# Patient Record
Sex: MALE | Race: WHITE | Employment: OTHER | URBAN - METROPOLITAN AREA
[De-identification: names, ages, dates, MRNs, and addresses within clinical notes are randomized per-mention and may not be internally consistent; named-entity substitution may affect disease eponyms.]

---

## 2020-11-03 ENCOUNTER — APPOINTMENT (OUTPATIENT)
Dept: INTERVENTIONAL RADIOLOGY/VASCULAR | Age: 60
End: 2020-11-03
Payer: MEDICARE

## 2020-11-03 ENCOUNTER — HOSPITAL ENCOUNTER (EMERGENCY)
Age: 60
Discharge: OTHER INSTITUTION | End: 2020-11-04
Attending: EMERGENCY MEDICINE
Payer: MEDICARE

## 2020-11-03 ENCOUNTER — APPOINTMENT (OUTPATIENT)
Dept: CT IMAGING | Age: 60
End: 2020-11-03
Payer: MEDICARE

## 2020-11-03 ENCOUNTER — APPOINTMENT (OUTPATIENT)
Dept: GENERAL RADIOLOGY | Age: 60
End: 2020-11-03
Payer: MEDICARE

## 2020-11-03 PROBLEM — I21.4 NSTEMI (NON-ST ELEVATED MYOCARDIAL INFARCTION) (HCC): Status: ACTIVE | Noted: 2020-11-03

## 2020-11-03 LAB
ABSOLUTE EOS #: 0.1 K/UL (ref 0–0.4)
ABSOLUTE IMMATURE GRANULOCYTE: ABNORMAL K/UL (ref 0–0.3)
ABSOLUTE LYMPH #: 2.2 K/UL (ref 1–4.8)
ABSOLUTE MONO #: 0.4 K/UL (ref 0.1–1.3)
ANION GAP SERPL CALCULATED.3IONS-SCNC: 9 MMOL/L (ref 9–17)
BASOPHILS # BLD: 1 % (ref 0–2)
BASOPHILS ABSOLUTE: 0.1 K/UL (ref 0–0.2)
BNP INTERPRETATION: NORMAL
BUN BLDV-MCNC: 15 MG/DL (ref 8–23)
BUN/CREAT BLD: ABNORMAL (ref 9–20)
CALCIUM SERPL-MCNC: 9.5 MG/DL (ref 8.6–10.4)
CHLORIDE BLD-SCNC: 102 MMOL/L (ref 98–107)
CO2: 24 MMOL/L (ref 20–31)
CREAT SERPL-MCNC: 0.64 MG/DL (ref 0.7–1.2)
D-DIMER QUANTITATIVE: 1.59 MG/L FEU (ref 0–0.59)
DIFFERENTIAL TYPE: ABNORMAL
EOSINOPHILS RELATIVE PERCENT: 1 % (ref 0–4)
GFR AFRICAN AMERICAN: >60 ML/MIN
GFR NON-AFRICAN AMERICAN: >60 ML/MIN
GFR SERPL CREATININE-BSD FRML MDRD: ABNORMAL ML/MIN/{1.73_M2}
GFR SERPL CREATININE-BSD FRML MDRD: ABNORMAL ML/MIN/{1.73_M2}
GLUCOSE BLD-MCNC: 131 MG/DL (ref 70–99)
HCT VFR BLD CALC: 44.9 % (ref 41–53)
HEMOGLOBIN: 15.3 G/DL (ref 13.5–17.5)
IMMATURE GRANULOCYTES: ABNORMAL %
INR BLD: 1
LYMPHOCYTES # BLD: 41 % (ref 24–44)
MCH RBC QN AUTO: 30.5 PG (ref 26–34)
MCHC RBC AUTO-ENTMCNC: 34.1 G/DL (ref 31–37)
MCV RBC AUTO: 89.4 FL (ref 80–100)
MONOCYTES # BLD: 8 % (ref 1–7)
MYOGLOBIN: 74 NG/ML (ref 28–72)
MYOGLOBIN: 94 NG/ML (ref 28–72)
NRBC AUTOMATED: ABNORMAL PER 100 WBC
PARTIAL THROMBOPLASTIN TIME: 26.9 SEC (ref 24–36)
PDW BLD-RTO: 12.8 % (ref 11.5–14.9)
PLATELET # BLD: 195 K/UL (ref 150–450)
PLATELET ESTIMATE: ABNORMAL
PMV BLD AUTO: 7.6 FL (ref 6–12)
POTASSIUM SERPL-SCNC: 4 MMOL/L (ref 3.7–5.3)
PRO-BNP: 70 PG/ML
PROTHROMBIN TIME: 12.6 SEC (ref 11.8–14.6)
RBC # BLD: 5.03 M/UL (ref 4.5–5.9)
RBC # BLD: ABNORMAL 10*6/UL
SEG NEUTROPHILS: 49 % (ref 36–66)
SEGMENTED NEUTROPHILS ABSOLUTE COUNT: 2.6 K/UL (ref 1.3–9.1)
SODIUM BLD-SCNC: 135 MMOL/L (ref 135–144)
THYROXINE, FREE: 1.36 NG/DL (ref 0.93–1.7)
TROPONIN INTERP: ABNORMAL
TROPONIN INTERP: ABNORMAL
TROPONIN T: ABNORMAL NG/ML
TROPONIN T: ABNORMAL NG/ML
TROPONIN, HIGH SENSITIVITY: 27 NG/L (ref 0–22)
TROPONIN, HIGH SENSITIVITY: 70 NG/L (ref 0–22)
TSH SERPL DL<=0.05 MIU/L-ACNC: 1.28 MIU/L (ref 0.3–5)
WBC # BLD: 5.4 K/UL (ref 3.5–11)
WBC # BLD: ABNORMAL 10*3/UL

## 2020-11-03 PROCEDURE — 85730 THROMBOPLASTIN TIME PARTIAL: CPT

## 2020-11-03 PROCEDURE — 99285 EMERGENCY DEPT VISIT HI MDM: CPT

## 2020-11-03 PROCEDURE — 85379 FIBRIN DEGRADATION QUANT: CPT

## 2020-11-03 PROCEDURE — 96376 TX/PRO/DX INJ SAME DRUG ADON: CPT

## 2020-11-03 PROCEDURE — 2709999900 IR FLUORO GUIDED CVA DEVICE PLMT/REPLACE/REMOVAL

## 2020-11-03 PROCEDURE — 6370000000 HC RX 637 (ALT 250 FOR IP): Performed by: EMERGENCY MEDICINE

## 2020-11-03 PROCEDURE — 83880 ASSAY OF NATRIURETIC PEPTIDE: CPT

## 2020-11-03 PROCEDURE — 6360000004 HC RX CONTRAST MEDICATION: Performed by: EMERGENCY MEDICINE

## 2020-11-03 PROCEDURE — 36415 COLL VENOUS BLD VENIPUNCTURE: CPT

## 2020-11-03 PROCEDURE — 84439 ASSAY OF FREE THYROXINE: CPT

## 2020-11-03 PROCEDURE — 85025 COMPLETE CBC W/AUTO DIFF WBC: CPT

## 2020-11-03 PROCEDURE — 71045 X-RAY EXAM CHEST 1 VIEW: CPT

## 2020-11-03 PROCEDURE — 83874 ASSAY OF MYOGLOBIN: CPT

## 2020-11-03 PROCEDURE — 96374 THER/PROPH/DIAG INJ IV PUSH: CPT

## 2020-11-03 PROCEDURE — 76000 FLUOROSCOPY <1 HR PHYS/QHP: CPT | Performed by: RADIOLOGY

## 2020-11-03 PROCEDURE — 76937 US GUIDE VASCULAR ACCESS: CPT | Performed by: RADIOLOGY

## 2020-11-03 PROCEDURE — 84484 ASSAY OF TROPONIN QUANT: CPT

## 2020-11-03 PROCEDURE — 71260 CT THORAX DX C+: CPT

## 2020-11-03 PROCEDURE — 36410 VNPNXR 3YR/> PHY/QHP DX/THER: CPT | Performed by: RADIOLOGY

## 2020-11-03 PROCEDURE — 85610 PROTHROMBIN TIME: CPT

## 2020-11-03 PROCEDURE — 2500000003 HC RX 250 WO HCPCS: Performed by: EMERGENCY MEDICINE

## 2020-11-03 PROCEDURE — 80048 BASIC METABOLIC PNL TOTAL CA: CPT

## 2020-11-03 PROCEDURE — 2580000003 HC RX 258: Performed by: EMERGENCY MEDICINE

## 2020-11-03 PROCEDURE — 99291 CRITICAL CARE FIRST HOUR: CPT

## 2020-11-03 PROCEDURE — 6360000002 HC RX W HCPCS: Performed by: EMERGENCY MEDICINE

## 2020-11-03 PROCEDURE — 96372 THER/PROPH/DIAG INJ SC/IM: CPT

## 2020-11-03 PROCEDURE — 93005 ELECTROCARDIOGRAM TRACING: CPT | Performed by: EMERGENCY MEDICINE

## 2020-11-03 PROCEDURE — 84443 ASSAY THYROID STIM HORMONE: CPT

## 2020-11-03 RX ORDER — NICOTINE 21 MG/24HR
1 PATCH, TRANSDERMAL 24 HOURS TRANSDERMAL DAILY
Status: DISCONTINUED | OUTPATIENT
Start: 2020-11-03 | End: 2020-11-05 | Stop reason: HOSPADM

## 2020-11-03 RX ORDER — MORPHINE SULFATE 4 MG/ML
4 INJECTION, SOLUTION INTRAMUSCULAR; INTRAVENOUS ONCE
Status: COMPLETED | OUTPATIENT
Start: 2020-11-03 | End: 2020-11-03

## 2020-11-03 RX ORDER — NITROGLYCERIN 20 MG/100ML
5 INJECTION INTRAVENOUS CONTINUOUS
Status: DISCONTINUED | OUTPATIENT
Start: 2020-11-03 | End: 2020-11-05 | Stop reason: HOSPADM

## 2020-11-03 RX ORDER — ASPIRIN 81 MG/1
324 TABLET, CHEWABLE ORAL ONCE
Status: COMPLETED | OUTPATIENT
Start: 2020-11-03 | End: 2020-11-03

## 2020-11-03 RX ORDER — OXYCODONE HYDROCHLORIDE AND ACETAMINOPHEN 5; 325 MG/1; MG/1
1 TABLET ORAL ONCE
Status: COMPLETED | OUTPATIENT
Start: 2020-11-03 | End: 2020-11-03

## 2020-11-03 RX ORDER — SODIUM CHLORIDE 0.9 % (FLUSH) 0.9 %
10 SYRINGE (ML) INJECTION PRN
Status: DISCONTINUED | OUTPATIENT
Start: 2020-11-03 | End: 2020-11-05 | Stop reason: HOSPADM

## 2020-11-03 RX ORDER — NITROGLYCERIN 0.4 MG/1
0.4 TABLET SUBLINGUAL EVERY 5 MIN PRN
Status: DISCONTINUED | OUTPATIENT
Start: 2020-11-03 | End: 2020-11-05 | Stop reason: HOSPADM

## 2020-11-03 RX ORDER — 0.9 % SODIUM CHLORIDE 0.9 %
80 INTRAVENOUS SOLUTION INTRAVENOUS ONCE
Status: COMPLETED | OUTPATIENT
Start: 2020-11-03 | End: 2020-11-03

## 2020-11-03 RX ORDER — M-VIT,TX,IRON,MINS/CALC/FOLIC 27MG-0.4MG
1 TABLET ORAL DAILY
Status: ON HOLD | COMMUNITY
End: 2020-11-06 | Stop reason: HOSPADM

## 2020-11-03 RX ADMIN — NITROGLYCERIN 5 MCG/MIN: 20 INJECTION INTRAVENOUS at 22:27

## 2020-11-03 RX ADMIN — NITROGLYCERIN 0.4 MG: 0.4 TABLET SUBLINGUAL at 13:45

## 2020-11-03 RX ADMIN — MORPHINE SULFATE 4 MG: 4 INJECTION, SOLUTION INTRAMUSCULAR; INTRAVENOUS at 14:59

## 2020-11-03 RX ADMIN — SODIUM CHLORIDE 80 ML: 9 INJECTION, SOLUTION INTRAVENOUS at 17:00

## 2020-11-03 RX ADMIN — OXYCODONE HYDROCHLORIDE AND ACETAMINOPHEN 1 TABLET: 5; 325 TABLET ORAL at 22:49

## 2020-11-03 RX ADMIN — ENOXAPARIN SODIUM 70 MG: 80 INJECTION SUBCUTANEOUS at 18:55

## 2020-11-03 RX ADMIN — ASPIRIN 324 MG: 81 TABLET, CHEWABLE ORAL at 18:09

## 2020-11-03 RX ADMIN — MORPHINE SULFATE 4 MG: 4 INJECTION, SOLUTION INTRAMUSCULAR; INTRAVENOUS at 18:09

## 2020-11-03 RX ADMIN — IOPAMIDOL 75 ML: 755 INJECTION, SOLUTION INTRAVENOUS at 17:00

## 2020-11-03 RX ADMIN — Medication 10 ML: at 17:00

## 2020-11-03 ASSESSMENT — PAIN SCALES - GENERAL
PAINLEVEL_OUTOF10: 4
PAINLEVEL_OUTOF10: 6
PAINLEVEL_OUTOF10: 8

## 2020-11-03 ASSESSMENT — ENCOUNTER SYMPTOMS
SORE THROAT: 0
COUGH: 0
EYE DISCHARGE: 0
EYE REDNESS: 0
FACIAL SWELLING: 0
BLOOD IN STOOL: 0
DIARRHEA: 0
VOMITING: 0
WHEEZING: 0
SHORTNESS OF BREATH: 0
EYE PAIN: 0
BACK PAIN: 0
TROUBLE SWALLOWING: 0
COLOR CHANGE: 0
CONSTIPATION: 0
ABDOMINAL PAIN: 0
SINUS PRESSURE: 0
NAUSEA: 0
CHEST TIGHTNESS: 0
RHINORRHEA: 0

## 2020-11-03 NOTE — ED NOTES
Pt resting in bed. Reports pain is now a 2/10. Denies needs at this time.      Julio Garcia RN  11/03/20 8005

## 2020-11-03 NOTE — ED PROVIDER NOTES
[address] ED  eMERGENCY dEPARTMENT eNCOUnter      Pt Name: Elva Modi  MRN: [id number]  [place] 1960  Date of evaluation: 11/3/20      CHIEF COMPLAINT       Chief Complaint   Patient presents with    Chest Pain         HISTORY OF PRESENT ILLNESS    Elva Modi is a 63 y.o. male who presents complaining of chest pain. Patient states that about an hour ago he started having what felt like an electrical sensation going across from the center of his chest and down to the upper portions of his bilateral arms. Patient states the pain is still there he did take an aspirin and the pain is starting to resolve but it still there. Patient states he is not had any recent illnesses did not feel short of breath or have palpitations. Patient has had no fevers. Patient has had history of blood clots in the past and also strokes. REVIEW OF SYSTEMS       Review of Systems   Constitutional: Negative for activity change, appetite change, chills, diaphoresis and fever. HENT: Negative for congestion, ear pain, facial swelling, nosebleeds, rhinorrhea, sinus pressure, sore throat and trouble swallowing. Eyes: Negative for pain, discharge and redness. Respiratory: Negative for cough, chest tightness, shortness of breath and wheezing. Cardiovascular: Positive for chest pain. Negative for palpitations and leg swelling. Gastrointestinal: Negative for abdominal pain, blood in stool, constipation, diarrhea, nausea and vomiting. Genitourinary: Negative for difficulty urinating, dysuria, flank pain, frequency, genital sores and hematuria. Musculoskeletal: Negative for arthralgias, back pain, gait problem, joint swelling, myalgias and neck pain. Skin: Negative for color change, pallor, rash and wound. Neurological: Negative for dizziness, tremors, seizures, syncope, speech difficulty, weakness, numbness and headaches.    Psychiatric/Behavioral: Negative for confusion, decreased concentration, hallucinations, self-injury, sleep disturbance and suicidal ideas. PAST MEDICAL HISTORY     Past Medical History:   Diagnosis Date    Hepatitis C     Hyperlipidemia     Hypertension     L5 spinal cord injury ([address]     problem with right leg nerve    Pancreatitis     Pulmonary embolism (HCC)     Stroke (HCC)     TIA (transient ischemic attack)        SURGICAL HISTORY       Past Surgical History:   Procedure Laterality Date    BACK SURGERY      LIVER BIOPSY         CURRENT MEDICATIONS       Previous Medications    ASPIRIN 81 MG EC TABLET    Take 1 tablet by mouth daily    GABAPENTIN (NEURONTIN) 100 MG CAPSULE    Take 100 mg by mouth nightly     SIMVASTATIN (ZOCOR) 40 MG TABLET    Take 1 tablet by mouth nightly    TRAZODONE (DESYREL) 100 MG TABLET    Take 100 mg by mouth nightly. WARFARIN (COUMADIN) 5 MG TABLET    Review INR prior to administration. ALLERGIES     is allergic to ativan [lorazepam]. FAMILY HISTORY     [unfilled]     SOCIAL HISTORY      reports that he has been smoking. He has been smoking about 0.50 packs per day. He has never used smokeless tobacco. He reports that he does not drink alcohol or use drugs. PHYSICAL EXAM     INITIAL VITALS: /65   Pulse 61   Temp 97.6 °F (36.4 °C)   Resp 13   Ht 6' (1.829 m)   Wt 154 lb (69.9 kg)   SpO2 95%   BMI 20.89 kg/m²      Physical Exam  Vitals signs and nursing note reviewed. Constitutional:       General: He is not in acute distress. Appearance: He is well-developed. He is not diaphoretic. HENT:      Head: Normocephalic and atraumatic. Eyes:      General: No scleral icterus. Right eye: No discharge. Left eye: No discharge. Conjunctiva/sclera: Conjunctivae normal.      Pupils: Pupils are equal, round, and reactive to light. Cardiovascular:      Rate and Rhythm: Normal rate and regular rhythm. Heart sounds: Normal heart sounds. No murmur. No friction rub. No gallop.     Pulmonary: Effort: Pulmonary effort is normal. No respiratory distress. Breath sounds: Normal breath sounds. No wheezing or rales. Chest:      Chest wall: No tenderness. Abdominal:      General: Bowel sounds are normal. There is no distension. Palpations: Abdomen is soft. There is no mass. Tenderness: There is no abdominal tenderness. There is no guarding or rebound. Musculoskeletal: Normal range of motion. General: No tenderness. Skin:     General: Skin is warm and dry. Coloration: Skin is not pale. Findings: No erythema or rash. Neurological:      Mental Status: He is alert and oriented to person, place, and time. Cranial Nerves: No cranial nerve deficit. Sensory: No sensory deficit. Motor: No abnormal muscle tone. Coordination: Coordination normal.      Deep Tendon Reflexes: Reflexes normal.   Psychiatric:         Behavior: Behavior normal.         Thought Content: Thought content normal.         Judgment: Judgment normal.         MEDICAL DECISION MAKING:     Patient symptoms are not typical for cardiac complaint but patient has significant peripheral vascular disease and is also had a prior PE in the past therefore he does deserve a full work-up. DIAGNOSTIC RESULTS     EKG: All EKG's are interpreted by the Emergency Department Physician who either signs or Co-signs this chart in the absence of a cardiologist.    EKG Interpretation    Interpreted by emergency department physician    Rhythm: normal sinus   Rate: normal  Axis: right  Ectopy: none  Conduction: normal  ST Segments: normal  T Waves: normal  Q Waves: none    Clinical Impression: EKG: normal sinus rhythm, right axis deviation otherwise no acute abnormalities. Almon Gearing        RADIOLOGY:All plain film, CT, MRI, and formal ultrasound images (except ED bedside ultrasound)are read by the radiologist and interpretations are directly viewed by the emergency physician.   Ir Fluoro Guided Marcelo Mahajan Unknown Type of Exam: Unknown FINDINGS: Single portable frontal view of the chest is submitted for review. The cardiac silhouette is normal in size. Lung parenchyma is clear without focal airspace consolidation, sizeable pleural effusion, or pneumothorax. Trachea is midline. Visualized osseous structures and soft tissues are grossly intact. No acute cardiopulmonary pathology. Ct Chest Pulmonary Embolism W Contrast    Result Date: 11/3/2020  EXAMINATION: CTA OF THE CHEST 11/3/2020 3:50 pm TECHNIQUE: CTA of the chest was performed after the administration of intravenous contrast.  Multiplanar reformatted images are provided for review. MIP images are provided for review. Dose modulation, iterative reconstruction, and/or weight based adjustment of the mA/kV was utilized to reduce the radiation dose to as low as reasonably achievable. COMPARISON: CT chest dated October 9, 2012 HISTORY: ORDERING SYSTEM PROVIDED HISTORY: Chest Pain r/o PE TECHNOLOGIST PROVIDED HISTORY: Chest Pain r/o PE Reason for Exam: chest pain since 8am Acuity: Acute Type of Exam: Initial Relevant Medical/Surgical History: hx: PE and stroke FINDINGS: Pulmonary Arteries: Pulmonary arteries are adequately opacified for evaluation. No evidence of intraluminal filling defect to suggest pulmonary embolism. Main pulmonary artery is normal in caliber. Mediastinum: No evidence of mediastinal lymphadenopathy. The heart and pericardium demonstrate no acute abnormality. There is no acute abnormality of the thoracic aorta. Lungs/pleura: Emphysematous changes are again seen throughout the lungs, with an upper lobe predominance. Dependent changes are noted within the lung bases. Apical fibrotic changes are again visualized bilaterally. 3 mm nodule is again seen within the left upper lobe, image number 52, unchanged. 5 mm nodule is again seen within the left lower lobe, image number 93, and previously measured approximately 4 mm.   No new nodules are present. Upper Abdomen: Images through the upper abdomen demonstrate parapelvic cyst formation, within the left kidney, unchanged. Soft Tissues/Bones: No acute osseous abnormality is present. A prominent pectus deformity is again visualized. Epidural stimulator is noted within the lower thoracic region. 1. No evidence of pulmonary embolism 2. 5 and 3 mm left lung nodule. 3. Diffuse emphysematous changes again seen throughout the lungs, with an upper lobe predominance 4. No evidence of pneumonia RECOMMENDATIONS: Fleischner Society guidelines for follow-up and management of incidentally detected pulmonary nodules: Multiple Solid Nodules: Nodule size less than 6 mm: In a low-risk patient, no routine follow-up. - Low risk patients include individuals with minimal or absent history of smoking and other known risk factors. - High risk patients include individuals with a history or smoking or known risk factors. Reference: Radiology 2017 http://pubs. rsna.org/doi/full/10.1148/radiol. 7371038078           LABS: All lab results were reviewed byparesh, and all abnormals are listed below.   Labs Reviewed   BASIC METABOLIC PANEL - Abnormal; Notable for the following components:       Result Value    Glucose 131 (*)     CREATININE 0.64 (*)     All other components within normal limits   CBC WITH AUTO DIFFERENTIAL - Abnormal; Notable for the following components:    Monocytes 8 (*)     All other components within normal limits   D-DIMER, QUANTITATIVE - Abnormal; Notable for the following components:    D-Dimer, Quant 1.59 (*)     All other components within normal limits   TROP/MYOGLOBIN - Abnormal; Notable for the following components:    Troponin, High Sensitivity 27 (*)     Myoglobin 74 (*)     All other components within normal limits   TROP/MYOGLOBIN - Abnormal; Notable for the following components:    Troponin, High Sensitivity 70 (*)     Myoglobin 94 (*)     All other components within normal limits   BRAIN NATRIURETIC PEPTIDE   TSH WITHOUT REFLEX   T4, FREE   APTT   PROTIME-INR         EMERGENCY DEPARTMENT COURSE:   Vitals:    Vitals:    11/03/20 1530 11/03/20 1545 11/03/20 1600 11/03/20 1615   BP: 102/71 110/67 104/68 103/65   Pulse: 65 62 60 61   Resp: 13 9 11 13   Temp:       SpO2: 96% 96% 96% 95%   Weight:       Height:           The patient was given the following medications while in the emergency department:     Orders Placed This Encounter   Medications    nitroGLYCERIN (NITROSTAT) SL tablet 0.4 mg    morphine sulfate (PF) injection 4 mg    nicotine (NICODERM CQ) 21 MG/24HR 1 patch    0.9 % sodium chloride bolus    sodium chloride flush 0.9 % injection 10 mL    iopamidol (ISOVUE-370) 76 % injection 75 mL    aspirin chewable tablet 324 mg    enoxaparin (LOVENOX) injection 70 mg    morphine sulfate (PF) injection 4 mg       -------------------------  5:49 PM EST  Patient was reevaluated and updated on results. Patient is still having pain mostly in his back at this point in time but he has an elevating troponin. I spoke with Dr. Madina Almonte for cardiology and he would prefer the patient be transferred to [facility] where there is a Cath Lab but will see him here if there is no bed available at [address] [facility] PM EST  Discussed the case with nurse practitioner Miley saez at [facility] who accepts the transfer and Dr. Bea Garay name. CRITICAL CARE:     CRITICAL CARE: There was a high probability of clinically significant/life threatening deterioration in this patient's condition which required my urgent intervention. Total critical care time was 30 minutes. This excludes any time for separately reportable procedures.          CONSULTS:  IP CONSULT TO CARDIOLOGY  IP CONSULT TO PRIMARY CARE PROVIDER    PROCEDURES:  None    FINAL IMPRESSION      1. NSTEMI (non-ST elevated myocardial infarction) [facility])          DISPOSITION/PLAN   DISPOSITION Decision To Transfer 11/03/2020 05:49:15 PM      PATIENT

## 2020-11-03 NOTE — ED NOTES
Mode of arrival (squad #, walk in, police, etc) : Walk-in        Chief complaint(s): Chest pain, Bilateral Arm pain. Arrival Note (brief scenario, treatment PTA, etc). : Pt presents to the Ed with chief complaint of mid sternal chest pain and bilateral arm pain. Pt repotrs arm pain start in his \"armpits and goes down to my elbow\" Pt states he took an 81mg aspirin at home PTA and states the pain in now going away. C= \"Have you ever felt that you should Cut down on your drinking? \"  Refused  A= \"Have people Annoyed you by criticizing your drinking? \"  No  G= \"Have you ever felt bad or Guilty about your drinking? \"  No  E= \"Have you ever had a drink as an Eye-opener first thing in the morning to steady your nerves or to help a hangover? \"  No      Deferred []      Reason for deferring: N/A    *If yes to two or more: probable alcohol abuse. Yarely Reyna RN  11/03/20 1548

## 2020-11-03 NOTE — ED NOTES
Pt returned to ED from IR. This RN to bedside to draw repeat troponin.       Trisha Singh, RN  11/03/20 6111

## 2020-11-03 NOTE — ED NOTES
Prior to administration of first nitro pt rated chest pain a 9/10. 5 minutes after first nitro patient reports pain is now a 4/10. 2nd dose of nitro not administered due to drop in patients blood pressure.       Anna David RN  11/03/20 3304

## 2020-11-03 NOTE — ED NOTES
Access center was called at 1806 due to not hearing from the Hospitalist from [facility] yet; they repaged DENIZ Boland, at 1806. [facility] [place] actually called back with DENIZ Boland at 3072, and consult was completed at this time.  is accepting the patient, and as of now we are just waiting on a bed assignment.      Sunny Uribe  11/03/20 7998

## 2020-11-04 VITALS
DIASTOLIC BLOOD PRESSURE: 83 MMHG | HEART RATE: 64 BPM | OXYGEN SATURATION: 94 % | SYSTOLIC BLOOD PRESSURE: 112 MMHG | BODY MASS INDEX: 20.86 KG/M2 | RESPIRATION RATE: 17 BRPM | HEIGHT: 72 IN | TEMPERATURE: 97.8 F | WEIGHT: 154 LBS

## 2020-11-04 LAB
TROPONIN INTERP: ABNORMAL
TROPONIN INTERP: ABNORMAL
TROPONIN T: ABNORMAL NG/ML
TROPONIN T: ABNORMAL NG/ML
TROPONIN, HIGH SENSITIVITY: 58 NG/L (ref 0–22)
TROPONIN, HIGH SENSITIVITY: 88 NG/L (ref 0–22)

## 2020-11-04 PROCEDURE — 36415 COLL VENOUS BLD VENIPUNCTURE: CPT

## 2020-11-04 PROCEDURE — 6360000002 HC RX W HCPCS: Performed by: EMERGENCY MEDICINE

## 2020-11-04 PROCEDURE — 6370000000 HC RX 637 (ALT 250 FOR IP): Performed by: EMERGENCY MEDICINE

## 2020-11-04 PROCEDURE — 6360000002 HC RX W HCPCS: Performed by: INTERNAL MEDICINE

## 2020-11-04 PROCEDURE — 84484 ASSAY OF TROPONIN QUANT: CPT

## 2020-11-04 RX ORDER — MORPHINE SULFATE 4 MG/ML
4 INJECTION, SOLUTION INTRAMUSCULAR; INTRAVENOUS ONCE
Status: COMPLETED | OUTPATIENT
Start: 2020-11-04 | End: 2020-11-04

## 2020-11-04 RX ORDER — OXYCODONE HYDROCHLORIDE AND ACETAMINOPHEN 5; 325 MG/1; MG/1
1 TABLET ORAL ONCE
Status: COMPLETED | OUTPATIENT
Start: 2020-11-04 | End: 2020-11-04

## 2020-11-04 RX ORDER — ASPIRIN 81 MG/1
81 TABLET ORAL DAILY
Status: DISCONTINUED | OUTPATIENT
Start: 2020-11-05 | End: 2020-11-05 | Stop reason: HOSPADM

## 2020-11-04 RX ORDER — ACETAMINOPHEN 500 MG
1000 TABLET ORAL ONCE
Status: COMPLETED | OUTPATIENT
Start: 2020-11-04 | End: 2020-11-04

## 2020-11-04 RX ADMIN — ACETAMINOPHEN 1000 MG: 500 TABLET, FILM COATED ORAL at 09:30

## 2020-11-04 RX ADMIN — ENOXAPARIN SODIUM 70 MG: 80 INJECTION SUBCUTANEOUS at 19:22

## 2020-11-04 RX ADMIN — MORPHINE SULFATE 4 MG: 4 INJECTION, SOLUTION INTRAMUSCULAR; INTRAVENOUS at 12:39

## 2020-11-04 RX ADMIN — OXYCODONE HYDROCHLORIDE AND ACETAMINOPHEN 1 TABLET: 5; 325 TABLET ORAL at 22:24

## 2020-11-04 RX ADMIN — MORPHINE SULFATE 4 MG: 4 INJECTION, SOLUTION INTRAMUSCULAR; INTRAVENOUS at 03:03

## 2020-11-04 RX ADMIN — MORPHINE SULFATE 4 MG: 4 INJECTION, SOLUTION INTRAMUSCULAR; INTRAVENOUS at 17:22

## 2020-11-04 RX ADMIN — ENOXAPARIN SODIUM 70 MG: 80 INJECTION SUBCUTANEOUS at 06:38

## 2020-11-04 ASSESSMENT — PAIN SCALES - GENERAL
PAINLEVEL_OUTOF10: 7
PAINLEVEL_OUTOF10: 8
PAINLEVEL_OUTOF10: 7
PAINLEVEL_OUTOF10: 7
PAINLEVEL_OUTOF10: 6
PAINLEVEL_OUTOF10: 7
PAINLEVEL_OUTOF10: 8

## 2020-11-04 ASSESSMENT — PAIN DESCRIPTION - PAIN TYPE: TYPE: ACUTE PAIN

## 2020-11-04 ASSESSMENT — PAIN DESCRIPTION - ORIENTATION: ORIENTATION: LEFT;MID

## 2020-11-04 ASSESSMENT — PAIN DESCRIPTION - FREQUENCY: FREQUENCY: INTERMITTENT

## 2020-11-04 ASSESSMENT — PAIN DESCRIPTION - DESCRIPTORS
DESCRIPTORS: CONSTANT
DESCRIPTORS: ACHING

## 2020-11-04 ASSESSMENT — PAIN DESCRIPTION - LOCATION
LOCATION: BACK
LOCATION: CHEST

## 2020-11-04 NOTE — ED NOTES
Waiting on a bed at [facility] for patient as Mindi Letters is discharge dependent.       Caden Murphy  11/03/20 3046

## 2020-11-04 NOTE — ED NOTES
Spoke with patient's daughter [facility] and updated on plan of care per pt request.      Aparna Carver RN  11/04/20 3882

## 2020-11-04 NOTE — ED NOTES
Report given to Manas Morrison RN from ED. Report method in person   The following was reviewed with receiving RN:   Current vital signs:  /82   Pulse 69   Temp 97.6 °F (36.4 °C)   Resp 9   Ht 6' (1.829 m)   Wt 154 lb (69.9 kg)   SpO2 97%   BMI 20.89 kg/m²                      Any medication or safety alerts were reviewed. Any pending diagnostics and notifications were also reviewed, as well as any safety concerns or issues, abnormal labs, abnormal imaging, and abnormal assessment findings. Questions were answered.             Merline Myers RN  11/03/20 7890

## 2020-11-04 NOTE — ED NOTES
Verbal order per Dr. Iona Mendoza to give 4mg morphine IM due to Pt. Not having an IV that will flush. Injection given in right deltoid.      Erickson Cormier RN  11/04/20 9105

## 2020-11-04 NOTE — ED NOTES
Pt provided w kristi ginger ale and apple juice. Pt resting comfortably in bed, call light within reach.        Serafin Lee  11/04/20 8634

## 2020-11-04 NOTE — PROGRESS NOTES
Medication History completed:    New medications: Morphine pain pump, multivitamin    Medications discontinued: Aspirin, Warfarin, Gabapentin, Trazodone, Simvastatin    Changes to dosing: None    Stated allergies: As listed    Other pertinent information: Morphine pain pump. Follows with [facility]. OARRS reviewed. Patient states they use Marijuana.     Thank you,  Thaddeus Lesch, PharmD Candidate [id number]

## 2020-11-04 NOTE — ED NOTES
Spoke with [facility] will not have any bed available tonight.       Eusebio Faust  11/03/20 2049

## 2020-11-04 NOTE — ED NOTES
Spoke with [facility] regarding transfer to [facility] [address] and was told they are no beds available tonight. Access states there will be beds at [facility] at shift change. Updated pt on information. Pt agreeable to transfer to [facility]. Cardiologist paged.      Jad Vicente RN  11/04/20 5029

## 2020-11-05 ENCOUNTER — HOSPITAL ENCOUNTER (INPATIENT)
Age: 60
LOS: 1 days | Discharge: HOME | DRG: 247 | End: 2020-11-06
Attending: INTERNAL MEDICINE | Admitting: INTERNAL MEDICINE
Payer: MEDICARE

## 2020-11-05 ENCOUNTER — APPOINTMENT (OUTPATIENT)
Dept: CARDIAC CATH/INVASIVE PROCEDURES | Age: 60
DRG: 247 | End: 2020-11-05
Attending: INTERNAL MEDICINE
Payer: MEDICARE

## 2020-11-05 LAB
ACTIVATED CLOTTING TIME: 238 SEC (ref 79–149)
HCT VFR BLD CALC: 47.3 % (ref 40.7–50.3)
HEMOGLOBIN: 16 G/DL (ref 13–17)
MCH RBC QN AUTO: 29.6 PG (ref 25.2–33.5)
MCHC RBC AUTO-ENTMCNC: 33.8 G/DL (ref 28.4–34.8)
MCV RBC AUTO: 87.6 FL (ref 82.6–102.9)
NRBC AUTOMATED: 0 PER 100 WBC
PARTIAL THROMBOPLASTIN TIME: 62.8 SEC (ref 20.5–30.5)
PDW BLD-RTO: 12 % (ref 11.8–14.4)
PLATELET # BLD: 187 K/UL (ref 138–453)
PMV BLD AUTO: 9.4 FL (ref 8.1–13.5)
RBC # BLD: 5.4 M/UL (ref 4.21–5.77)
TROPONIN INTERP: ABNORMAL
TROPONIN INTERP: ABNORMAL
TROPONIN T: ABNORMAL NG/ML
TROPONIN T: ABNORMAL NG/ML
TROPONIN, HIGH SENSITIVITY: 67 NG/L (ref 0–22)
TROPONIN, HIGH SENSITIVITY: 74 NG/L (ref 0–22)
WBC # BLD: 10.6 K/UL (ref 3.5–11.3)

## 2020-11-05 PROCEDURE — 85347 COAGULATION TIME ACTIVATED: CPT

## 2020-11-05 PROCEDURE — 36415 COLL VENOUS BLD VENIPUNCTURE: CPT

## 2020-11-05 PROCEDURE — C1769 GUIDE WIRE: HCPCS

## 2020-11-05 PROCEDURE — 99222 1ST HOSP IP/OBS MODERATE 55: CPT | Performed by: INTERNAL MEDICINE

## 2020-11-05 PROCEDURE — 6360000004 HC RX CONTRAST MEDICATION

## 2020-11-05 PROCEDURE — 6360000002 HC RX W HCPCS: Performed by: STUDENT IN AN ORGANIZED HEALTH CARE EDUCATION/TRAINING PROGRAM

## 2020-11-05 PROCEDURE — 027034Z DILATION OF CORONARY ARTERY, ONE ARTERY WITH DRUG-ELUTING INTRALUMINAL DEVICE, PERCUTANEOUS APPROACH: ICD-10-PCS | Performed by: INTERNAL MEDICINE

## 2020-11-05 PROCEDURE — 6370000000 HC RX 637 (ALT 250 FOR IP): Performed by: STUDENT IN AN ORGANIZED HEALTH CARE EDUCATION/TRAINING PROGRAM

## 2020-11-05 PROCEDURE — C1725 CATH, TRANSLUMIN NON-LASER: HCPCS

## 2020-11-05 PROCEDURE — C9600 PERC DRUG-EL COR STENT SING: HCPCS | Performed by: INTERNAL MEDICINE

## 2020-11-05 PROCEDURE — 93458 L HRT ARTERY/VENTRICLE ANGIO: CPT | Performed by: INTERNAL MEDICINE

## 2020-11-05 PROCEDURE — 85027 COMPLETE CBC AUTOMATED: CPT

## 2020-11-05 PROCEDURE — 2580000003 HC RX 258: Performed by: NURSE PRACTITIONER

## 2020-11-05 PROCEDURE — C1874 STENT, COATED/COV W/DEL SYS: HCPCS

## 2020-11-05 PROCEDURE — 2500000003 HC RX 250 WO HCPCS

## 2020-11-05 PROCEDURE — 6360000002 HC RX W HCPCS: Performed by: NURSE PRACTITIONER

## 2020-11-05 PROCEDURE — 2060000000 HC ICU INTERMEDIATE R&B

## 2020-11-05 PROCEDURE — 85730 THROMBOPLASTIN TIME PARTIAL: CPT

## 2020-11-05 PROCEDURE — 4A023N7 MEASUREMENT OF CARDIAC SAMPLING AND PRESSURE, LEFT HEART, PERCUTANEOUS APPROACH: ICD-10-PCS | Performed by: INTERNAL MEDICINE

## 2020-11-05 PROCEDURE — 2709999900 HC NON-CHARGEABLE SUPPLY

## 2020-11-05 PROCEDURE — 6360000002 HC RX W HCPCS

## 2020-11-05 PROCEDURE — 6370000000 HC RX 637 (ALT 250 FOR IP)

## 2020-11-05 PROCEDURE — C1887 CATHETER, GUIDING: HCPCS

## 2020-11-05 PROCEDURE — 6370000000 HC RX 637 (ALT 250 FOR IP): Performed by: NURSE PRACTITIONER

## 2020-11-05 PROCEDURE — B2111ZZ FLUOROSCOPY OF MULTIPLE CORONARY ARTERIES USING LOW OSMOLAR CONTRAST: ICD-10-PCS | Performed by: INTERNAL MEDICINE

## 2020-11-05 PROCEDURE — 2580000003 HC RX 258: Performed by: STUDENT IN AN ORGANIZED HEALTH CARE EDUCATION/TRAINING PROGRAM

## 2020-11-05 PROCEDURE — 84484 ASSAY OF TROPONIN QUANT: CPT

## 2020-11-05 PROCEDURE — B2151ZZ FLUOROSCOPY OF LEFT HEART USING LOW OSMOLAR CONTRAST: ICD-10-PCS | Performed by: INTERNAL MEDICINE

## 2020-11-05 RX ORDER — HYDRALAZINE HYDROCHLORIDE 20 MG/ML
10 INJECTION INTRAMUSCULAR; INTRAVENOUS EVERY 10 MIN PRN
Status: DISCONTINUED | OUTPATIENT
Start: 2020-11-05 | End: 2020-11-06 | Stop reason: HOSPADM

## 2020-11-05 RX ORDER — ASPIRIN 81 MG/1
81 TABLET, CHEWABLE ORAL DAILY
Status: DISCONTINUED | OUTPATIENT
Start: 2020-11-06 | End: 2020-11-06 | Stop reason: HOSPADM

## 2020-11-05 RX ORDER — SODIUM CHLORIDE 0.9 % (FLUSH) 0.9 %
10 SYRINGE (ML) INJECTION PRN
Status: DISCONTINUED | OUTPATIENT
Start: 2020-11-05 | End: 2020-11-06 | Stop reason: HOSPADM

## 2020-11-05 RX ORDER — HEPARIN SODIUM 1000 [USP'U]/ML
4000 INJECTION, SOLUTION INTRAVENOUS; SUBCUTANEOUS ONCE
Status: DISCONTINUED | OUTPATIENT
Start: 2020-11-05 | End: 2020-11-05

## 2020-11-05 RX ORDER — ATORVASTATIN CALCIUM 80 MG/1
80 TABLET, FILM COATED ORAL NIGHTLY
Status: DISCONTINUED | OUTPATIENT
Start: 2020-11-05 | End: 2020-11-06 | Stop reason: HOSPADM

## 2020-11-05 RX ORDER — LABETALOL HYDROCHLORIDE 5 MG/ML
10 INJECTION, SOLUTION INTRAVENOUS EVERY 30 MIN PRN
Status: DISCONTINUED | OUTPATIENT
Start: 2020-11-05 | End: 2020-11-06 | Stop reason: HOSPADM

## 2020-11-05 RX ORDER — HEPARIN SODIUM 1000 [USP'U]/ML
4000 INJECTION, SOLUTION INTRAVENOUS; SUBCUTANEOUS PRN
Status: DISCONTINUED | OUTPATIENT
Start: 2020-11-05 | End: 2020-11-05

## 2020-11-05 RX ORDER — ACETAMINOPHEN 325 MG/1
650 TABLET ORAL EVERY 6 HOURS PRN
Status: DISCONTINUED | OUTPATIENT
Start: 2020-11-05 | End: 2020-11-06 | Stop reason: HOSPADM

## 2020-11-05 RX ORDER — HEPARIN SODIUM 10000 [USP'U]/100ML
12 INJECTION, SOLUTION INTRAVENOUS CONTINUOUS
Status: DISCONTINUED | OUTPATIENT
Start: 2020-11-05 | End: 2020-11-05

## 2020-11-05 RX ORDER — ACETAMINOPHEN 650 MG/1
650 SUPPOSITORY RECTAL EVERY 6 HOURS PRN
Status: DISCONTINUED | OUTPATIENT
Start: 2020-11-05 | End: 2020-11-06 | Stop reason: HOSPADM

## 2020-11-05 RX ORDER — SODIUM CHLORIDE 0.9 % (FLUSH) 0.9 %
10 SYRINGE (ML) INJECTION PRN
Status: DISCONTINUED | OUTPATIENT
Start: 2020-11-05 | End: 2020-11-05

## 2020-11-05 RX ORDER — ONDANSETRON 2 MG/ML
4 INJECTION INTRAMUSCULAR; INTRAVENOUS EVERY 6 HOURS PRN
Status: DISCONTINUED | OUTPATIENT
Start: 2020-11-05 | End: 2020-11-06 | Stop reason: HOSPADM

## 2020-11-05 RX ORDER — HEPARIN SODIUM 5000 [USP'U]/ML
5000 INJECTION, SOLUTION INTRAVENOUS; SUBCUTANEOUS EVERY 8 HOURS SCHEDULED
Status: DISCONTINUED | OUTPATIENT
Start: 2020-11-05 | End: 2020-11-06 | Stop reason: HOSPADM

## 2020-11-05 RX ORDER — SODIUM CHLORIDE 0.9 % (FLUSH) 0.9 %
10 SYRINGE (ML) INJECTION EVERY 12 HOURS SCHEDULED
Status: DISCONTINUED | OUTPATIENT
Start: 2020-11-05 | End: 2020-11-06 | Stop reason: HOSPADM

## 2020-11-05 RX ORDER — MORPHINE SULFATE 4 MG/ML
4 INJECTION, SOLUTION INTRAMUSCULAR; INTRAVENOUS
Status: DISCONTINUED | OUTPATIENT
Start: 2020-11-05 | End: 2020-11-06 | Stop reason: HOSPADM

## 2020-11-05 RX ORDER — ASPIRIN 81 MG/1
81 TABLET, CHEWABLE ORAL DAILY
Status: DISCONTINUED | OUTPATIENT
Start: 2020-11-06 | End: 2020-11-05

## 2020-11-05 RX ORDER — POTASSIUM CHLORIDE 7.45 MG/ML
10 INJECTION INTRAVENOUS PRN
Status: DISCONTINUED | OUTPATIENT
Start: 2020-11-05 | End: 2020-11-06 | Stop reason: HOSPADM

## 2020-11-05 RX ORDER — HEPARIN SODIUM 1000 [USP'U]/ML
2000 INJECTION, SOLUTION INTRAVENOUS; SUBCUTANEOUS PRN
Status: DISCONTINUED | OUTPATIENT
Start: 2020-11-05 | End: 2020-11-05

## 2020-11-05 RX ORDER — POTASSIUM CHLORIDE 20 MEQ/1
40 TABLET, EXTENDED RELEASE ORAL PRN
Status: DISCONTINUED | OUTPATIENT
Start: 2020-11-05 | End: 2020-11-06 | Stop reason: HOSPADM

## 2020-11-05 RX ORDER — MORPHINE SULFATE 2 MG/ML
2 INJECTION, SOLUTION INTRAMUSCULAR; INTRAVENOUS
Status: DISCONTINUED | OUTPATIENT
Start: 2020-11-05 | End: 2020-11-06 | Stop reason: HOSPADM

## 2020-11-05 RX ORDER — ACETAMINOPHEN 325 MG/1
650 TABLET ORAL EVERY 4 HOURS PRN
Status: DISCONTINUED | OUTPATIENT
Start: 2020-11-05 | End: 2020-11-06 | Stop reason: HOSPADM

## 2020-11-05 RX ORDER — MAGNESIUM SULFATE 1 G/100ML
1 INJECTION INTRAVENOUS PRN
Status: DISCONTINUED | OUTPATIENT
Start: 2020-11-05 | End: 2020-11-06 | Stop reason: HOSPADM

## 2020-11-05 RX ORDER — SODIUM CHLORIDE 0.9 % (FLUSH) 0.9 %
10 SYRINGE (ML) INJECTION EVERY 12 HOURS SCHEDULED
Status: DISCONTINUED | OUTPATIENT
Start: 2020-11-05 | End: 2020-11-05

## 2020-11-05 RX ORDER — ATORVASTATIN CALCIUM 80 MG/1
80 TABLET, FILM COATED ORAL NIGHTLY
Status: DISCONTINUED | OUTPATIENT
Start: 2020-11-05 | End: 2020-11-05

## 2020-11-05 RX ORDER — FAMOTIDINE 20 MG/1
20 TABLET, FILM COATED ORAL 2 TIMES DAILY
Status: DISCONTINUED | OUTPATIENT
Start: 2020-11-05 | End: 2020-11-05

## 2020-11-05 RX ORDER — NITROGLYCERIN 0.4 MG/1
0.4 TABLET SUBLINGUAL EVERY 5 MIN PRN
Status: DISCONTINUED | OUTPATIENT
Start: 2020-11-05 | End: 2020-11-06 | Stop reason: HOSPADM

## 2020-11-05 RX ORDER — PROMETHAZINE HYDROCHLORIDE 12.5 MG/1
12.5 TABLET ORAL EVERY 6 HOURS PRN
Status: DISCONTINUED | OUTPATIENT
Start: 2020-11-05 | End: 2020-11-06 | Stop reason: HOSPADM

## 2020-11-05 RX ADMIN — MORPHINE SULFATE 2 MG: 2 INJECTION, SOLUTION INTRAMUSCULAR; INTRAVENOUS at 08:40

## 2020-11-05 RX ADMIN — MORPHINE SULFATE 2 MG: 2 INJECTION, SOLUTION INTRAMUSCULAR; INTRAVENOUS at 11:31

## 2020-11-05 RX ADMIN — METOPROLOL TARTRATE 12.5 MG: 25 TABLET ORAL at 23:08

## 2020-11-05 RX ADMIN — FAMOTIDINE 20 MG: 20 TABLET, FILM COATED ORAL at 02:52

## 2020-11-05 RX ADMIN — Medication 10 ML: at 23:09

## 2020-11-05 RX ADMIN — Medication 10 ML: at 08:44

## 2020-11-05 RX ADMIN — MORPHINE SULFATE 2 MG: 2 INJECTION, SOLUTION INTRAMUSCULAR; INTRAVENOUS at 23:10

## 2020-11-05 RX ADMIN — MORPHINE SULFATE 2 MG: 2 INJECTION, SOLUTION INTRAMUSCULAR; INTRAVENOUS at 18:44

## 2020-11-05 RX ADMIN — METOPROLOL TARTRATE 12.5 MG: 25 TABLET ORAL at 08:30

## 2020-11-05 RX ADMIN — ATORVASTATIN CALCIUM 80 MG: 80 TABLET, FILM COATED ORAL at 23:09

## 2020-11-05 RX ADMIN — METOPROLOL TARTRATE 12.5 MG: 25 TABLET ORAL at 02:52

## 2020-11-05 RX ADMIN — SODIUM CHLORIDE, PRESERVATIVE FREE 10 ML: 5 INJECTION INTRAVENOUS at 02:52

## 2020-11-05 RX ADMIN — HEPARIN SODIUM 5000 UNITS: 5000 INJECTION INTRAVENOUS; SUBCUTANEOUS at 23:11

## 2020-11-05 RX ADMIN — NITROGLYCERIN 0.4 MG: 0.4 TABLET SUBLINGUAL at 18:10

## 2020-11-05 RX ADMIN — FAMOTIDINE 20 MG: 20 TABLET, FILM COATED ORAL at 08:30

## 2020-11-05 RX ADMIN — MORPHINE SULFATE 2 MG: 2 INJECTION, SOLUTION INTRAMUSCULAR; INTRAVENOUS at 02:52

## 2020-11-05 ASSESSMENT — ENCOUNTER SYMPTOMS
COUGH: 0
ABDOMINAL DISTENTION: 0
NAUSEA: 0
ABDOMINAL PAIN: 1
SHORTNESS OF BREATH: 0
VOMITING: 0
BLOOD IN STOOL: 0
BACK PAIN: 1
WHEEZING: 0
PHOTOPHOBIA: 0

## 2020-11-05 ASSESSMENT — PAIN DESCRIPTION - ORIENTATION: ORIENTATION: LEFT;MID

## 2020-11-05 ASSESSMENT — PAIN DESCRIPTION - DESCRIPTORS: DESCRIPTORS: PRESSURE

## 2020-11-05 ASSESSMENT — PAIN DESCRIPTION - FREQUENCY: FREQUENCY: INTERMITTENT

## 2020-11-05 ASSESSMENT — PAIN SCALES - GENERAL
PAINLEVEL_OUTOF10: 10
PAINLEVEL_OUTOF10: 6
PAINLEVEL_OUTOF10: 4
PAINLEVEL_OUTOF10: 6
PAINLEVEL_OUTOF10: 3
PAINLEVEL_OUTOF10: 8

## 2020-11-05 ASSESSMENT — PAIN DESCRIPTION - PAIN TYPE
TYPE: ACUTE PAIN
TYPE: ACUTE PAIN

## 2020-11-05 ASSESSMENT — PAIN DESCRIPTION - LOCATION: LOCATION: BACK;CHEST

## 2020-11-05 ASSESSMENT — PAIN DESCRIPTION - ONSET: ONSET: ON-GOING

## 2020-11-05 NOTE — ED NOTES
Spoke with Shira Guevara at [facility]. ETA of 0200. Will call around for sooner transportation.       [facility]  11/04/20 2041

## 2020-11-05 NOTE — ED NOTES
RN attempted to call Neal Loya RN at [facility] [phone] for an update. RASHAD Ling unavailable at this time and to call this RN back.       Julien Huntley RN  11/04/20 6872

## 2020-11-05 NOTE — CONSULTS
[facility] Cardiology    Inpatient Consultation Note               Today's Date: 11/5/2020  Patient Name: Kostas Kee  Date of admission: 11/5/2020 12:40 AM  Patient's age: 63 y.o., 1960  Admission Dx: NSTEMI (non-ST elevated myocardial infarction) [facility]) [I21.4]    Reason for  Consult:  Chest pain along with troponin elevation    Requesting Physician: Cely Ortiz DO    CHIEF COMPLAINT:     No chief complaint on file. History Obtained From:  patient, electronic medical record    HISTORY OF PRESENT ILLNESS:      The patient is a 63 y.o. male who is admitted to the hospital for chest pain. PMH significant for HTN, HLD, Pulmonary embolism and CVA. Patient presented to the ED due to acute onset chest pain which he describes as sharp and radiating from his chest to back and both arms. He reports no other complaints. Reports pain improved after SL nitroglycerin. EKG showed no acute changes. Troponin noted to be up trending. Cardiology consulted for Chest pain in the setting of elevated troponin. Past Medical History:   has a past medical history of Hepatitis C, Hyperlipidemia, Hypertension, L5 spinal cord injury ([address] Pancreatitis, Pulmonary embolism ([facility] [address] [facility] Stroke ([address] and TIA (transient ischemic attack). Past Surgical History:   has a past surgical history that includes liver biopsy and back surgery. Home Medications:    Prior to Admission medications    Medication Sig Start Date End Date Taking? Authorizing Provider   Multiple Vitamins-Minerals (THERAPEUTIC MULTIVITAMIN-MINERALS) tablet Take 1 tablet by mouth daily    Historical Provider, MD   NONFORMULARY Morphine pain pump.  Follows with [facility]    Historical Provider, MD        Current Facility-Administered Medications: sodium chloride flush 0.9 % injection 10 mL, 10 mL, Intravenous, 2 times per day  sodium chloride flush 0.9 % injection 10 mL, 10 mL, Intravenous, PRN  potassium chloride (KLOR-CON M) voiding, or hematuria. · Musculoskeletal: No joint complaints. · Neurological: No headache  · Hematologic/Lymphatic: No abnormal bruising or bleeding      PHYSICAL EXAM:      /85   Pulse 71   Temp 98.1 °F (36.7 °C) (Oral)   Resp 12   Ht 6' (1.829 m)   Wt 153 lb (69.4 kg)   SpO2 95%   BMI 20.75 kg/m²      Intake/Output Summary (Last 24 hours) at 11/5/2020 0911  Last data filed at 11/5/2020 0827  Gross per 24 hour   Intake 100 ml   Output 625 ml   Net -525 ml         Constitutional and General Appearance:    Alert, cooperative, no distress and appears stated age  Respiratory:  · No for increased work of breathing. · On auscultation: clear to auscultation bilaterally  Cardiovascular:  · Regular S1 and S2.   · Systolic murmur appreciated at LLSB  Abdomen:   · No masses or tenderness  · Bowel sounds present  Extremities:  ·  No Cyanosis or Clubbing  ·  Lower extremity edema: No  Neurological:  · Alert and oriented. · Moves all extremities well    DATA:    Diagnostics:    ECHO 8/28/15: EF 55%, AV sclerosis without stenosis, mildly thickened MV leaflets. Labs:     CBC:   Recent Labs     11/03/20  1350   WBC 5.4   HGB 15.3   HCT 44.9        BMP:   Recent Labs     11/03/20  1350      K 4.0   CO2 24   BUN 15   CREATININE 0.64*   LABGLOM >60   GLUCOSE 131*     Pro-BNP:    Recent Labs     11/03/20  1350   PROBNP 70     BNP: No results for input(s): BNP in the last 72 hours. PT/INR:   Recent Labs     11/03/20  1350   PROTIME 12.6   INR 1.0     APTT:  Recent Labs     11/03/20  1350   APTT 26.9     CARDIAC ENZYMES:No results for input(s): CKTOTAL, CKMB, CKMBINDEX, TROPONINI in the last 72 hours.     Invalid input(s):  [address]  Recent Labs     11/04/20  1925 11/05/20  0316 11/05/20  0523   TROPONINT NOT REPORTED NOT REPORTED NOT REPORTED       FASTING LIPID PANEL:  Lab Results   Component Value Date    HDL 30 08/28/2015    TRIG 127 08/28/2015     LIVER PROFILE:No results for input(s): AST, ALT, Additional Comments:   NSTEMI type 1  CP concern for UA  HTN  DL  CVA  - start heparin drip  - on ASA, statin, BB  - I have recommend left heart catheterization with possible PCI. I have discussed risks (including but not limited to vascular injury, infection, hematoma, contrast induced kidney dysfunction, CVA and MI), benefits, alternatives in detail. All questions answered. Patient agrees to proceed. - check 2d Echo    Discussed with patient and nursing. Thank you for allowing me to participate in the care of this patient, please do not hesitate to call if you have any questions. Landry Kauffman DO, [facility], [address], [address] [facility]  [facility] [place]  [phone]

## 2020-11-05 NOTE — PROGRESS NOTES
[place] [facility]  Documentation Note                Admission Dx: NSTEMI (non-ST elevated myocardial infarction) ([facility] [address] [I21.4]    Past Medical History:   has a past medical history of Hepatitis C, Hyperlipidemia, Hypertension, L5 spinal cord injury ([facility] [address] Pancreatitis, Pulmonary embolism ([facility] [address] Stroke ([facility] [address] and TIA (transient ischemic attack). Previous Testing:     ECHO 8/28/15: EF 55%, AV sclerosis without stenosis, mildly thickened MV leaflets. Previous office/hospital visit:   Dr. Rahel Arreola 11/4/2020 ([facility])  1. Atypical chest pain  2. NSTEMI  3. Essential HTN  4. Hyperlipidemia  5. H/o CVA  6. H/o PE; negative hypercoagulable w/u in 2015  7. COPD    8. Tobacco abuse    Plan --  1. Continue IV NTG and follow troponins  2. Will give 2nd dose of Lovenox tonight, then hold for cardiac catheterization tomorrow at [address]; warfarin on hold with INR 1.0 yesterday   3. Will continue ASA at 81 mg daily   4. Will start metoprolol 12.5 mg po bid   5. Pt will likely be admitted to [address] when bed is available this evening.     Lv Ballesteros [facility]

## 2020-11-05 NOTE — ED NOTES
Report given to Cem Cisse RN for an update from Cristobal Garcia, [facility]. Report method by phone   The following was reviewed with receiving RN:   Current vital signs:  /83   Pulse 64   Temp 97.8 °F (36.6 °C) (Oral)   Resp 17   Ht 6' (1.829 m)   Wt 154 lb (69.9 kg)   SpO2 94%   BMI 20.89 kg/m²                MEWS Score: 1     Any medication or safety alerts were reviewed. Any pending diagnostics and notifications were also reviewed, as well as any safety concerns or issues, abnormal labs, abnormal imaging, and abnormal assessment findings. Questions were answered.             Gamal Josue RN  11/05/20 0005

## 2020-11-05 NOTE — PROGRESS NOTES
Patient admitted, consent signed and questions answered. Patient ready for procedure. Call light to reach with side rails up 2 of 2. Right wrist and bilateral groin clipped. History and physical completed.

## 2020-11-05 NOTE — CARE COORDINATION
Case Management Initial Discharge Plan  Favian ,             Met with:patient to discuss discharge plans. Information verified: address, contacts, phone number, , insurance Yes    Emergency Contact/Next of Kin name & number:   Nolan Cushing, daughter, [phone] and daughter Paula Perdomo [phone]    PCP: No primary care provider on file. Date of last visit: He will get one after D/C, declined my offer to do    Insurance Provider: Medicare/[employer]    Discharge Planning    Living Arrangements:  [address] [place] [address] [place]:  Children, Family Members, Tigre Valdivia has 1 story  3 stairs to climb to get into front door    Patient able to perform ADL's:Independent    Current Services (outpatient & in home) none  DME equipment: cane  DME provider:     Receiving oral anticoagulation therapy? No    If indicated:   Physician managing anticoagulation treatment: n/a  Where does patient obtain lab work for ATC treatment? n/a      Potential Assistance Needed:       Patient agreeable to home care: No  [place] of choice provided:  n/a    Prior SNF/Rehab Placement and Facility: none  Agreeable to SNF/Rehab: No  [place] of choice provided: n/a     Evaluation: no    Expected Discharge date:       Patient expects to be discharged to: Follow Up Appointment: Best Day/ Time:      Transportation provider: kids  Transportation arrangements needed for discharge: No    Readmission Risk              Risk of Unplanned Readmission:        9             Does patient have a readmission risk score greater than 14?: No  If yes, follow-up appointment must be made within 7 days of discharge.      Goals of Care: Improved circulation to the heart      Discharge Plan: Home with son          Electronically signed by Cristy Zapata RN on 20 at 12:50 PM EST

## 2020-11-05 NOTE — CONSULTS
[facility]  In PatientCardiology Consult             Date:   11/4/2020  Patient name: Gaurav Sanchez  Date of admission:  11/3/2020  1:10 PM  MRN:   [id number]  YOB: 1960      Reason for Admission:  Chest pain    CHIEF COMPLAINT:  Chest pain    History Obtained From:  Patient and medical record    HISTORY OF PRESENT ILLNESS:      The patient is a 63 y.o gentleman with h/o HTN, HLP, PE and CVA, admitted for chest pain and elevated troponin. He had been remaining active with no chest pain or SOB until last evening, when he developed sharp pain shooting from the center of his chest, radiating to his back and to both arms. He has had no recent cough or fever. His pain improved from 10/10 intensity to 4/10 after one SL NTG, and he has had no further CP on IV NTG. EKG showed no acute changes, with troponin increasing from 27 ng/L last evening, to 70 ng/L this AM.  Chest CTA yesterday showed no infiltrates, edema or PE. Past Medical History:   has a past medical history of Hepatitis C, Hyperlipidemia, Hypertension, L5 spinal cord injury ([facility] [address] Pancreatitis, Pulmonary embolism ([facility] [address] Stroke ([facility] [address] and TIA (transient ischemic attack). Past Surgical History:   has a past surgical history that includes liver biopsy and back surgery. Home Medications:    Prior to Admission medications    Medication Sig Start Date End Date Taking? Authorizing Provider   Multiple Vitamins-Minerals (THERAPEUTIC MULTIVITAMIN-MINERALS) tablet Take 1 tablet by mouth daily   Yes Historical Provider, MD   NONFORMULARY Morphine pain pump. Follows with [facility]   Yes Historical Provider, MD       Allergies:  Ativan [lorazepam]    Social History:   reports that he has been smoking. He has been smoking about 0.50 packs per day. He has never used smokeless tobacco. He reports that he does not drink alcohol or use drugs.      Family History:   Positive for early CAD    REVIEW OF SYSTEMS: bruit  · Peripheral pulses are symmetrical and full   Abdomen:  · No masses or tenderness  · Bowel sounds present  Extremities:  ·  No Cyanosis or Clubbing  ·  Lower extremity edema: None; Caitlin's sign negative  ·  Skin: Warm and dry  Neurological:  · Alert and oriented. · Moves all extremities well  · No abnormalities of mood, affect, memory, mentation, or behavior are noted    DATA:    Diagnostics:      EKG:  Sinus rhythm, 69 bpm; incomplete RBBB and right superior axis deviation with no  changes from 2015    2D ECHO ( 8/29/15)  Technically difficult study due to patients body habitus. Left ventricle is normal in size. Global left ventricular systolic function  is normal.  Estimated ejection fraction is 55 % . Normal right ventricular size and function. Aortic valve sclerosis without stenosis. Mildly thickened mitral valve leaflets. No significant pericardial effusion is seen.         Labs:     CBC:   Recent Labs     11/03/20  1350   WBC 5.4   HGB 15.3   HCT 44.9        BMP:   Recent Labs     11/03/20  1350      K 4.0   CO2 24   BUN 15   CREATININE 0.64*   LABGLOM >60   GLUCOSE 131*     BNP: No results for input(s): BNP in the last 72 hours. PT/INR:   Recent Labs     11/03/20  1350   PROTIME 12.6   INR 1.0     APTT:  Recent Labs     11/03/20  1350   APTT 26.9     CARDIAC ENZYMES:No results for input(s): CKTOTAL, CKMB, CKMBINDEX, TROPONINI in the last 72 hours. FASTING LIPID PANEL:  Lab Results   Component Value Date    HDL 30 08/28/2015    TRIG 127 08/28/2015     LIVER PROFILE:No results for input(s): AST, ALT, LABALBU in the last 72 hours. IMPRESSION:    1. Atypical chest pain  2. NSTEMI  3. Essential HTN  4. Hyperlipidemia  5. H/o CVA  6. H/o PE; negative hypercoagulable w/u in 2015  7. COPD  8.  Tobacco abuse    Patient Active Problem List   Diagnosis    Cerebral infarction ([facility] [address]    Cerebral infarction due to embolism of left posterior cerebral artery (HCC)    Hyperglycemia    Smoking history    Hx pulmonary embolism    Hx of hepatitis C    Chronic pancreatitis (HCC)    Right sided weakness    NSTEMI (non-ST elevated myocardial infarction) ([facility] [address]       RECOMMENDATIONS:  1. Continue IV NTG and follow troponins  2. Will give 2nd dose of Lovenox tonight, then hold for cardiac catheterization tomorrow at [facility]; warfarin on hold with INR 1.0 yesterday  3. Will continue ASA at 81 mg daily  4. Will start metoprolol 12.5 mg po bid  5. Pt will likely be admitted to [facility] when bed is available this evening. Discussed with patient and nursing.     Electronically signed by Gideon Su MD on 11/4/2020 at 8:08 PM.  [place] [facility]

## 2020-11-05 NOTE — OP NOTE
[facility]    CARDIAC CATHETERIZATION    Date:   11/5/2020  Patient name:  Nithya Rosenbaum  Date of admission:  11/5/2020 12:40 AM  MRN:   [id number]  YOB: 1960    Operators:  Primary:   Shukri Glasgow MD (Attending Physician)        Procedure performed:     [x] Left Heart Catheterization. [] Graft Angiography. [x] Left Ventriculography. [] Right Heart Catheterization. [x] Coronary Angiography. [] Aortic Valve Studies. [x] PCI:      [] Other:       Pre Procedure Conscious Sedation Data:  ASA Class:    [] I [x] II [] III [] IV    Mallampati Class:  [] I [x] II [] III [] IV      Indication:  [] STEMI      [] + Stress test  [x] ACS      [] + EKG Changes  [x] Non Q MI       [] Significant Risk Factors  [] Recurrent Angina             [] Diabetes Mellitus    [] New LBBB      [] Other.  [] CHF / Low EF changes     [] Abnormal CTA / Ca Score      Procedure:  Access:  [] Femoral  [x] Radial  artery       [x] Right  [] Left    Procedure: After informed consent was obtained with explanation of the risks and benefits, patient was brought to the cath lab. The access area was prepped and draped in sterile fashion. 1% lidocaine was used for local block. The artery was cannulated with 6  Fr sheath with brisk arterial blood return. The side port was frequently flushed and aspirated with normal saline. Estimated Blood Loss:  [x] Minimal < 25 cc [] Moderate 25-50 cc  [] >50 cc    Findings:    LMCA: Normal 0% stenosis. LAD: Single stenosis.       Lesion on Prox LAD: Ostial.80% stenosis 12 mm length reduced to 0%. Pre     procedure AJ III flow was noted. Post Procedure AJ III flow was     present. Good runoff was present. The lesion was diagnosed as High Risk     (C).       Devices used         - Luge Wire 182 cm. Number of passes: 1.         - Xience Adriana 4.0 x 15 KARINA. 1 inflation(s) to a max pressure of: 12     jose.       LCx: Normal 0% stenosis.      RCA: Normal 0% stenosis. Ramus: Normal 0% stenosis. The LV gram was performed in the JACKSON 30 position. LVEF: 60 %. Conclusions:     Single vessel coronary artery disease.    Successful PCI / Drug Eluting Stent of the ostial LAD    Normal LCX and RCA.    Normal LV systolic function.           Recommendations:  1. Post-cath protocol  2. Continue optimal medical therapy  3. Risk factor modification      ____________________________________________________________________    History and Risk Factors    [x] Hypertension     [] Family history of CAD  [x] Hyperlipidemia     [] Cerebrovascular Disease   [] Prior MI       [] Peripheral Vascular disease   [] Prior PCI              [] Diabetes Mellitus    [] Left Main PCI. [] Currently on Dialysis. [] Prior CABG. [x] Currently smoker. [] Cardiac Arrest outside of healthcare facility. [] Yes    [x] No        Witnessed     [] Yes   [] No     Arrest after arrival of EMS  [] Yes   [] No     [] Cardiac Arrest at other Facility. [] Yes   [x] No    Pre-Procedure Information. Heart Failure       [] Yes    [x] No        Class  [] I      [] II  [] III    [] IV. New Diagnosis    [] Yes  [] No    HF Type      [] Systolic   [] Diastolic          [] Unknown. Diagnostic Test:   EKG       [] Normal   [x] Abnormal    New antiarrhythmia medications:    [] Yes   [] No   New onset atrial fibrillation / Flutter     [] Yes   [] No   ECG Abnormalities:      [] V. Fib   [] Kami V. Tach           [] NS V. T   [] New LBBB           [] T.  Inv  []  ST dev > 0.5 mm         [] PVC's freq  [] PVC's infrequent    Stress Test Performed:      [] Yes    [x] No     Type:     [] Stress Echo   [] Exercise Stress Test (no imaging)      [] Stress Nuclear  [] Stress Imaging     Results   [] Negative   [] Positive        [] Indeterminate  [] Unavailable     If Positive/ Risk / Extent of Ischemia:       [] Low  [] Intermediate         [] High  [] Unavailable      Cardiac CTA Performed:     [] Yes [x] No      Results   [] CAD   [] Non obstructive CAD      [] No CAD   [] Uncertain      [] Unknown   [] Structural Disease. Pre Procedure Medications:   [x] Yes    [] No         [x] ASA   [x] Beta Blockers      [] Nitrate   [] Ca Channel Blockers      [] Ranolazine   [x] Statin       [] Plavix/Others antiplatelets      Electronically signed on 11/5/2020 at 4:08 PM by:    Iglesia Campoverde MD  Fellow, [address]      Physician Statement  I have discussed the case of Elva Modi including pertinent history and exam findings with the resident. I have seen and examined the patient and the key elements of the encounter have been performed by me. I agree with the assessment, plan and orders as documented by the resident With changes made to the note. Procedure performed by me.     Electronically signed by Elder Stern MD on 11/6/2020 at 10:42 AM.    [facility]      [phone]
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
## 2020-11-05 NOTE — ED NOTES
Report given to [facility] RASHAD perkins from [facility]. V's. Report method by phone   The following was reviewed with receiving RN:   Current vital signs:  /76   Pulse 74   Temp 97.8 °F (36.6 °C) (Oral)   Resp 16   Ht 6' (1.829 m)   Wt 154 lb (69.9 kg)   SpO2 96%   BMI 20.89 kg/m²                MEWS Score: 1     Any medication or safety alerts were reviewed. Any pending diagnostics and notifications were also reviewed, as well as any safety concerns or issues, abnormal labs, abnormal imaging, and abnormal assessment findings. Questions were answered.           Nelia Sheth RN  11/04/20 [id number]

## 2020-11-06 VITALS
DIASTOLIC BLOOD PRESSURE: 78 MMHG | RESPIRATION RATE: 18 BRPM | BODY MASS INDEX: 20.72 KG/M2 | WEIGHT: 153 LBS | HEIGHT: 72 IN | SYSTOLIC BLOOD PRESSURE: 103 MMHG | OXYGEN SATURATION: 97 % | HEART RATE: 84 BPM | TEMPERATURE: 97.5 F

## 2020-11-06 PROBLEM — E78.00 HYPERCHOLESTEROLEMIA: Status: ACTIVE | Noted: 2020-11-06

## 2020-11-06 LAB
ALBUMIN SERPL-MCNC: 3.7 G/DL (ref 3.5–5.2)
ALBUMIN/GLOBULIN RATIO: 1.4 (ref 1–2.5)
ALP BLD-CCNC: 82 U/L (ref 40–129)
ALT SERPL-CCNC: 17 U/L (ref 5–41)
ANION GAP SERPL CALCULATED.3IONS-SCNC: 13 MMOL/L (ref 9–17)
AST SERPL-CCNC: 31 U/L
BILIRUB SERPL-MCNC: 0.53 MG/DL (ref 0.3–1.2)
BNP INTERPRETATION: NORMAL
BUN BLDV-MCNC: 15 MG/DL (ref 8–23)
BUN/CREAT BLD: ABNORMAL (ref 9–20)
CALCIUM SERPL-MCNC: 9 MG/DL (ref 8.6–10.4)
CHLORIDE BLD-SCNC: 103 MMOL/L (ref 98–107)
CHOLESTEROL/HDL RATIO: 5.1
CHOLESTEROL: 231 MG/DL
CO2: 17 MMOL/L (ref 20–31)
CREAT SERPL-MCNC: 0.75 MG/DL (ref 0.7–1.2)
EKG ATRIAL RATE: 69 BPM
EKG ATRIAL RATE: 69 BPM
EKG ATRIAL RATE: 77 BPM
EKG P AXIS: 31 DEGREES
EKG P AXIS: 33 DEGREES
EKG P AXIS: 33 DEGREES
EKG P-R INTERVAL: 142 MS
EKG P-R INTERVAL: 150 MS
EKG P-R INTERVAL: 188 MS
EKG Q-T INTERVAL: 390 MS
EKG Q-T INTERVAL: 392 MS
EKG Q-T INTERVAL: 406 MS
EKG QRS DURATION: 84 MS
EKG QRS DURATION: 88 MS
EKG QRS DURATION: 90 MS
EKG QTC CALCULATION (BAZETT): 420 MS
EKG QTC CALCULATION (BAZETT): 435 MS
EKG QTC CALCULATION (BAZETT): 441 MS
EKG R AXIS: -148 DEGREES
EKG R AXIS: -155 DEGREES
EKG R AXIS: 151 DEGREES
EKG T AXIS: 64 DEGREES
EKG T AXIS: 68 DEGREES
EKG T AXIS: 78 DEGREES
EKG VENTRICULAR RATE: 69 BPM
EKG VENTRICULAR RATE: 69 BPM
EKG VENTRICULAR RATE: 77 BPM
GFR AFRICAN AMERICAN: >60 ML/MIN
GFR NON-AFRICAN AMERICAN: >60 ML/MIN
GFR SERPL CREATININE-BSD FRML MDRD: ABNORMAL ML/MIN/{1.73_M2}
GFR SERPL CREATININE-BSD FRML MDRD: ABNORMAL ML/MIN/{1.73_M2}
GLUCOSE BLD-MCNC: 134 MG/DL (ref 70–99)
HCT VFR BLD CALC: 43.5 % (ref 40.7–50.3)
HDLC SERPL-MCNC: 45 MG/DL
HEMOGLOBIN: 14.3 G/DL (ref 13–17)
LDL CHOLESTEROL: 175 MG/DL (ref 0–130)
LV EF: 56 %
LVEF MODALITY: NORMAL
MAGNESIUM: 2 MG/DL (ref 1.6–2.6)
MCH RBC QN AUTO: 29.6 PG (ref 25.2–33.5)
MCHC RBC AUTO-ENTMCNC: 32.9 G/DL (ref 28.4–34.8)
MCV RBC AUTO: 90.1 FL (ref 82.6–102.9)
NRBC AUTOMATED: 0 PER 100 WBC
PARTIAL THROMBOPLASTIN TIME: 22.5 SEC (ref 20.5–30.5)
PARTIAL THROMBOPLASTIN TIME: 23.7 SEC (ref 20.5–30.5)
PDW BLD-RTO: 11.9 % (ref 11.8–14.4)
PLATELET # BLD: 194 K/UL (ref 138–453)
PMV BLD AUTO: 9.8 FL (ref 8.1–13.5)
POTASSIUM SERPL-SCNC: 4.2 MMOL/L (ref 3.7–5.3)
PRO-BNP: 164 PG/ML
RBC # BLD: 4.83 M/UL (ref 4.21–5.77)
SODIUM BLD-SCNC: 133 MMOL/L (ref 135–144)
TOTAL PROTEIN: 6.3 G/DL (ref 6.4–8.3)
TRIGL SERPL-MCNC: 53 MG/DL
VLDLC SERPL CALC-MCNC: ABNORMAL MG/DL (ref 1–30)
WBC # BLD: 7.6 K/UL (ref 3.5–11.3)

## 2020-11-06 PROCEDURE — 83880 ASSAY OF NATRIURETIC PEPTIDE: CPT

## 2020-11-06 PROCEDURE — 93010 ELECTROCARDIOGRAM REPORT: CPT | Performed by: INTERNAL MEDICINE

## 2020-11-06 PROCEDURE — 6370000000 HC RX 637 (ALT 250 FOR IP): Performed by: INTERNAL MEDICINE

## 2020-11-06 PROCEDURE — 85027 COMPLETE CBC AUTOMATED: CPT

## 2020-11-06 PROCEDURE — 93306 TTE W/DOPPLER COMPLETE: CPT

## 2020-11-06 PROCEDURE — 85730 THROMBOPLASTIN TIME PARTIAL: CPT

## 2020-11-06 PROCEDURE — 6370000000 HC RX 637 (ALT 250 FOR IP): Performed by: STUDENT IN AN ORGANIZED HEALTH CARE EDUCATION/TRAINING PROGRAM

## 2020-11-06 PROCEDURE — 93005 ELECTROCARDIOGRAM TRACING: CPT | Performed by: STUDENT IN AN ORGANIZED HEALTH CARE EDUCATION/TRAINING PROGRAM

## 2020-11-06 PROCEDURE — 6360000002 HC RX W HCPCS: Performed by: STUDENT IN AN ORGANIZED HEALTH CARE EDUCATION/TRAINING PROGRAM

## 2020-11-06 PROCEDURE — 36415 COLL VENOUS BLD VENIPUNCTURE: CPT

## 2020-11-06 PROCEDURE — 80061 LIPID PANEL: CPT

## 2020-11-06 PROCEDURE — 2580000003 HC RX 258: Performed by: STUDENT IN AN ORGANIZED HEALTH CARE EDUCATION/TRAINING PROGRAM

## 2020-11-06 PROCEDURE — 80053 COMPREHEN METABOLIC PANEL: CPT

## 2020-11-06 PROCEDURE — 83735 ASSAY OF MAGNESIUM: CPT

## 2020-11-06 PROCEDURE — 99238 HOSP IP/OBS DSCHRG MGMT 30/<: CPT | Performed by: INTERNAL MEDICINE

## 2020-11-06 RX ORDER — NICOTINE 21 MG/24HR
1 PATCH, TRANSDERMAL 24 HOURS TRANSDERMAL DAILY
Qty: 30 PATCH | Refills: 3 | Status: SHIPPED | OUTPATIENT
Start: 2020-11-06 | End: 2020-12-02 | Stop reason: ALTCHOICE

## 2020-11-06 RX ORDER — ATORVASTATIN CALCIUM 80 MG/1
80 TABLET, FILM COATED ORAL NIGHTLY
Qty: 30 TABLET | Refills: 3 | Status: SHIPPED | OUTPATIENT
Start: 2020-11-06

## 2020-11-06 RX ORDER — ASPIRIN 81 MG/1
81 TABLET, CHEWABLE ORAL DAILY
Qty: 30 TABLET | Refills: 3 | Status: SHIPPED | OUTPATIENT
Start: 2020-11-07

## 2020-11-06 RX ORDER — NITROGLYCERIN 0.4 MG/1
TABLET SUBLINGUAL
Qty: 25 TABLET | Refills: 3 | Status: SHIPPED | OUTPATIENT
Start: 2020-11-06

## 2020-11-06 RX ORDER — NICOTINE 21 MG/24HR
1 PATCH, TRANSDERMAL 24 HOURS TRANSDERMAL DAILY
Status: DISCONTINUED | OUTPATIENT
Start: 2020-11-06 | End: 2020-11-06 | Stop reason: HOSPADM

## 2020-11-06 RX ADMIN — METOPROLOL TARTRATE 12.5 MG: 25 TABLET ORAL at 08:33

## 2020-11-06 RX ADMIN — MORPHINE SULFATE 2 MG: 2 INJECTION, SOLUTION INTRAMUSCULAR; INTRAVENOUS at 03:18

## 2020-11-06 RX ADMIN — ASPIRIN 81 MG: 81 TABLET, CHEWABLE ORAL at 08:33

## 2020-11-06 RX ADMIN — MORPHINE SULFATE 2 MG: 2 INJECTION, SOLUTION INTRAMUSCULAR; INTRAVENOUS at 08:33

## 2020-11-06 RX ADMIN — Medication 10 ML: at 08:40

## 2020-11-06 RX ADMIN — HEPARIN SODIUM 5000 UNITS: 5000 INJECTION INTRAVENOUS; SUBCUTANEOUS at 08:33

## 2020-11-06 RX ADMIN — MORPHINE SULFATE 2 MG: 2 INJECTION, SOLUTION INTRAMUSCULAR; INTRAVENOUS at 11:46

## 2020-11-06 RX ADMIN — TICAGRELOR 90 MG: 90 TABLET ORAL at 08:33

## 2020-11-06 ASSESSMENT — ENCOUNTER SYMPTOMS
ABDOMINAL PAIN: 0
SHORTNESS OF BREATH: 0
NAUSEA: 0
VOMITING: 0
COUGH: 0
BACK PAIN: 1

## 2020-11-06 ASSESSMENT — PAIN SCALES - GENERAL
PAINLEVEL_OUTOF10: 7
PAINLEVEL_OUTOF10: 4
PAINLEVEL_OUTOF10: 6

## 2020-11-06 NOTE — PROGRESS NOTES
Air removed from TR band in increments. No bleeding redness or hematoma. Bandaid applied and arm board in place. Pt instructed on minimal wrist usage until am. Pulses palpable and assessed Q4  with vitals.

## 2020-11-06 NOTE — DISCHARGE SUMMARY
[facility]    Office: [phone]    Jared Valentino, DO, Jaci Colorado, DO, Hansel Mosqueda, DO, Margurette Remedies Blood, DO, Porfirio Frances MD, Aramis Pastor MD, Ngozi Chadwick MD, Theresa Saldana MD, Varsha Gutiérrez MD, Ezra Lima MD, Madhavi Regalado MD, Mary Patten MD, Carmen Lowery MD, Jaimie Maldonado, DO, Sadie Diana MD, Maria Luisa Kaur MD, Julio Asif DO, Karen Barker MD,  Marco Antonio Diaz DO, Shelby Monzon MD, Tayler Lewis MD, Grecia Michelle [facility], [facility], CNP, Lenin Medeiros, CNP, Becky Elizalde, CNS, Korina Ng, CNP, Jolie Patel, CNP, Simeon Chavez, CNP, Effie Resendez, CNP, Elvira Cortez CNP, Stephanie Akbar PA-C, Manuel Oseguera, MASOUD, Pancho Arndt, CNP, Jameel Valdez, CNP, Katie Wiley, CNP, Briana Miller, CNP, Tashia Hand, [facility]   [facility]    Discharge Summary    Patient ID: Melchor Baker  :  [id number]   MRN: [id number]     ACCOUNT:  [de-identified]   Patient's PCP: No primary care provider on file. Admit Date: 2020   Discharge Date: 2020    Discharge Physician: Roney Mendez DO     The patient was seen and examined on day of discharge and this discharge summary is in conjunction with any daily progress note from day of discharge.     Active Discharge Diagnoses:     Primary Problem  NSTEMI (non-ST elevated myocardial infarction) [facility])      [facility]    Diagnosis Date Noted    Hypercholesterolemia [E78.00] 2020    S/P angioplasty with stent [Z95.820]     Hypertension [I10]     L5 spinal cord injury ([facility] [S34.105A]     Pancreatitis [K85.90]     [facility] [I21.4] 2020    Chronic pancreatitis ([facility] [K86.1] 2015    Hx of hepatitis C [Z86.19] 2015    Hx pulmonary embolism [Z86.711] 2015    Tobacco use [Z72.0] 2015    Hyperglycemia [R73.9] 2015    Cerebral infarction due to embolism of left posterior cerebral artery [facility]) HOSP [facility] Course:     Brief History:  The patient is a 59 y. o. male who is admitted to the hospital for the management of: Chest pain     The patient had presented at [facility] with:  Julien Marie is a 63 y. o. male who presents complaining of chest pain.  Patient states that about an hour ago he started having what felt like an electrical sensation going across from the center of his chest and down to the upper portions of his bilateral arms.  Patient states the pain is still there he did take an aspirin and the pain is starting to resolve but it still there.  Patient states he is not had any recent illnesses did not feel short of breath or have palpitations.  Patient has had no fevers.  Patient has had history of blood clots in the past and also strokes. \"     The patient continues to have pain that radiates from right to left elbow across to his chest  That seems to be exacerbated by movement  He has responded to nitroglycerin     The patient has been transferred from [facility] for cardiac catheterization today     Initial database has included:  Troponin has been as high as 88  Myoglobin 94     D-dimer 1.59     CTA:  1. No evidence of pulmonary embolism    2. 5 and 3 mm left lung nodule. 3. Diffuse emphysematous changes again seen throughout the lungs, with an    upper lobe predominance    4. No evidence of pneumonia       LDL Cholesterol 175High   Cholesterol 231High      On 11/5 the patient underwent cardiac catheterization:  Findings:   LMCA: Normal 0% stenosis. LAD: Single stenosis.       Lesion on Prox LAD: Ostial.80% stenosis 12 mm length reduced to 0%. Pre     procedure AJ III flow was noted. Post Procedure AJ III flow was     present. Good runoff was present. The lesion was diagnosed as High Risk   (C). LCx: Normal 0% stenosis. RCA: Normal 0% stenosis.    Ramus: Normal 0% stenosis.    The LV gram was performed in the JACKSON 30 position. LVEF: 60 %.   Conclusions:    Single vessel coronary artery disease.    Successful PCI / Drug Eluting Stent of the ostial LAD    Normal LCX and RCA.    Normal LV systolic function     The patient's condition was stabilized  Discharge planning initiated      Discharge plan:     Aspirin   Lipitor  Metoprolol  Brilinta - the patient reports he may need financial assistance  Cardiology evaluation and follow-up as scheduled TCC  Rehab  Risk factor management / weight loss    Smoking cessation  Blood Pressure - Monitor and control  Monitor blood sugars  Pain management, follow-up Dr. Breanne Combs  Will discharge when arrangements complete and ok with other services.   Follow-up with PCP in one week  Notify PCP of discharge     Discharge Procedure Orders   [address]   Referral Priority: Routine Referral Type: Eval and Treat   Referral Reason: Specialty Services Required   Requested Specialty: Cardiac Rehabilitation   Number of Visits Requested: 1       Significant Diagnostic Studies:     Labs / Micro:  Labs:  Hematology:  Recent Labs     11/05/20 2001 11/06/20 0303   WBC 10.6 7.6   RBC 5.40 4.83   HGB 16.0 14.3   HCT 47.3 43.5   MCV 87.6 90.1   MCH 29.6 29.6   MCHC 33.8 32.9   RDW 12.0 11.9    194   MPV 9.4 9.8     Chemistry:  Recent Labs     11/04/20  1925 11/05/20 0316 11/05/20 0523 11/06/20  0303   NA  --   --   --  133*   K  --   --   --  4.2   CL  --   --   --  103   CO2  --   --   --  17*   GLUCOSE  --   --   --  134*   BUN  --   --   --  15   CREATININE  --   --   --  0.75   MG  --   --   --  2.0   ANIONGAP  --   --   --  13   LABGLOM  --   --   --  >60   GFRAA  --   --   --  >60   CALCIUM  --   --   --  9.0   PROBNP  --   --   --  164   TROPHS 58* 67* 74*  --      Recent Labs     11/06/20  0303   PROT 6.3*   LABALBU 3.7   AST 31   ALT 17   ALKPHOS 82   BILITOT 0.53   CHOL 231*   HDL 45   LDLCHOLESTEROL 175*   CHOLHDLRATIO 5.1*   TRIG 53   VLDL NOT previously measured approximately 4 mm. No new nodules are present. Upper Abdomen: Images through the upper abdomen demonstrate parapelvic cyst formation, within the left kidney, unchanged. Soft Tissues/Bones: No acute osseous abnormality is present. A prominent pectus deformity is again visualized. Epidural stimulator is noted within the lower thoracic region. 1. No evidence of pulmonary embolism 2. 5 and 3 mm left lung nodule. 3. Diffuse emphysematous changes again seen throughout the lungs, with an upper lobe predominance 4. No evidence of pneumonia RECOMMENDATIONS: Fleischner Society guidelines for follow-up and management of incidentally detected pulmonary nodules: Multiple Solid Nodules: Nodule size less than 6 mm: In a low-risk patient, no routine follow-up. - Low risk patients include individuals with minimal or absent history of smoking and other known risk factors. - High risk patients include individuals with a history or smoking or known risk factors. Reference: Radiology 2017 http://pubs. rsna.org/doi/full/10.1148/radiol. 2512804729         Consultations:    Consults:     Final Specialist Recommendations/Findings:   IP CONSULT TO CARDIOLOGY  IP CONSULT TO CARDIAC REHAB  IP CONSULT TO SOCIAL WORK        Discharged Condition:    Stable     Disposition: Home    Physician Follow Up: [facility]  [facility] [place] [facility].   [address] [place] [address] [address]  On 11/17/2020  11:30 for hospital follow-up with cardiology       Activity:  activity as tolerated  Cardiac /  Cath restrictions     Diet:  cardiac diet     Discharge Medications:      Medication List      START taking these medications    aspirin 81 MG chewable tablet  Take 1 tablet by mouth daily  Start taking on:  November 7, 2020     atorvastatin 80 MG tablet  Commonly known as:  LIPITOR  Take 1 tablet by mouth nightly     metoprolol tartrate 25 MG tablet  Commonly known as:  LOPRESSOR  Take 0.5 tablets by mouth 2 times

## 2020-11-06 NOTE — PROGRESS NOTES
CLINICAL PHARMACY NOTE: MEDS TO [address] Patient?: No  Total # of Prescriptions Filled: 5   The following medications were delivered to the patient:  · Aspirin 81 mg chew tab(otc)  · Nitroglycerin 0.4 mg sl tab  · Atorvastatin 80 mg tab  · Metoprolol tart. 25 mg  · brilinta 90 mg tab  Total # of Interventions Completed: 1  Time Spent (min): 60    Additional Documentation:Medications delivered to the patient in his room (2002). He will get nicotine patches at other store.

## 2020-11-06 NOTE — DISCHARGE INSTR - DIET

## 2020-11-06 NOTE — PROGRESS NOTES
[facility]  Progress Note                   Date:   11/6/2020  Patient name: Blu León  Date of admission:  11/5/2020 12:40 AM  MRN:   [id number]  YOB: 1960  PCP: No primary care provider on file. Reason for Admission: NSTEMI (non-ST elevated myocardial infarction) ([facility] [I21.4]    Subjective:       Clinical Changes /Abnormalities: Seen & examined in room after discussing with RN. No acute CV issues/concerns overnight. Tele, labs, & vitals reviewed. Review of Systems    Medications:   Scheduled Meds:   heparin (porcine)  5,000 Units Subcutaneous 3 times per day    sodium chloride flush  10 mL Intravenous 2 times per day    metoprolol tartrate  12.5 mg Oral BID    atorvastatin  80 mg Oral Nightly    aspirin  81 mg Oral Daily    ticagrelor  90 mg Oral BID     Continuous Infusions:  CBC:   Recent Labs     11/03/20  1350 11/05/20 2001 11/06/20  0303   WBC 5.4 10.6 7.6   HGB 15.3 16.0 14.3    187 194     BMP:    Recent Labs     11/03/20  1350 11/06/20  0303    133*   K 4.0 4.2    103   CO2 24 17*   BUN 15 15   CREATININE 0.64* 0.75   GLUCOSE 131* 134*     Hepatic:  Recent Labs     11/06/20  0303   AST 31   ALT 17   BILITOT 0.53   ALKPHOS 82     Troponin:   Recent Labs     11/04/20  1925 11/05/20  0316 11/05/20  0523   TROPHS 58* 67* 74*     BNP: No results for input(s): BNP in the last 72 hours. Lipids:   Recent Labs     11/06/20  0303   CHOL 231*   HDL 45     INR:   Recent Labs     11/03/20  1350   INR 1.0       Objective:   Vitals: /62   Pulse 84   Temp 97.7 °F (36.5 °C) (Oral)   Resp 18   Ht 6' (1.829 m)   Wt 153 lb (69.4 kg)   SpO2 97%   BMI 20.75 kg/m²   General appearance: alert and cooperative with exam  HEENT: Head: Normocephalic, no lesions, without obvious abnormality.   Neck:no JVD, trachea midline, no adenopathy  Lungs: Clear to auscultation  Heart: Regular rate and rhythm, s1/s2 auscultated, no murmurs  Abdomen: soft, non-tender, bowel sounds active  Extremities: no edema  Neurologic: not done    ECHO 8/28/15: EF 55%, AV sclerosis without stenosis, mildly thickened MV leaflets. Cardiac Cath 11/5/20  Findings:     LMCA: Normal 0% stenosis. LAD: Single stenosis.       Lesion on Prox LAD: Ostial.80% stenosis 12 mm length reduced to 0%. Pre     procedure AJ III flow was noted. Post Procedure AJ III flow was     present. Good runoff was present. The lesion was diagnosed as High Risk     (C).       Devices used         - Luge Wire 182 cm. Number of passes: 1.         - Xience Adriana 4.0 x 15 KARINA. 1 inflation(s) to a max pressure of: 12     jose.       LCx: Normal 0% stenosis. RCA: Normal 0% stenosis. Ramus: Normal 0% stenosis.      The LV gram was performed in the JACKSON 30 position. LVEF: 60 %.       Conclusions:      Single vessel coronary artery disease.    Successful PCI / Drug Eluting Stent of the ostial LAD    Normal LCX and RCA.    Normal LV systolic function.             Recommendations:  1. Post-cath protocol  2. Continue optimal medical therapy  3. Risk factor modification    Assessment / Acute Cardiac Problems:   1. NSTEMI  2. CAD, single vessel disease, S/P PTCA/KARINA osial LAD  3. Preserved LVEF  4. HTN  5. Hx of Hep C  6. Hx of PE  7.  Hx of CVA    Patient Active Problem List:     Cerebral infarction [facility])     Cerebral infarction due to embolism of left posterior cerebral artery (HCC)     Hyperglycemia     Smoking history     Hx pulmonary embolism     Hx of hepatitis C     Chronic pancreatitis (HCC)     Right sided weakness     NSTEMI (non-ST elevated myocardial infarction) ([address]     Hypertension     L5 spinal cord injury ([address]     Pancreatitis    Coronary Discharge Core Measure: Please indicate the medication given by X, and if not the reasons not given:    Not Given Reason  Given      Beta Blockers x      ACE-I Preserved lvef - reassess as OP      Statins x      ASA x      OAP (Plavix/Effient/Brilinta) Brilinta - samples, copay card given, & written RX given    SL Nitro x         Plan of Treatment:   1. Discussed in detail with patient post cath POC including but not limited to medications, diet, exercise, right radial artery site care, and follow-up. Questions and concerns addressed. OK for discharge home today. F/U in office in 1-3 weeks.       Electronically signed by MARIA TERESA Gamez CNP on 11/6/2020 at 9:51 AM  [address]  [phone]

## 2020-11-06 NOTE — PROGRESS NOTES
Pt c/o CP morphine given. 12 lead completed and sent to cardiology fellow. 12 lead reviewed. Pt reports improved pain.  Tara Oneill

## 2020-11-06 NOTE — PROGRESS NOTES
[facility]    Office: [address], DO, Hansa Iza, DO, Ivan Dos Santos, DO, Lamont Chris, DO, Vasyl Johnston MD, Ben Victor MD, Keiko Allen MD, Axel Candelario MD, Tahir Sharma MD, Mike Cleary MD, Amadou Roger MD, Sonja Phillips MD, Carmen Garcia MD, Deuce Meredith DO, Jazmyne Guzman MD, Luis Angel Lynch MD, Fahad Chaves, DO, Mendoza Monahan MD,  Jessica Kenyon DO, Drew Damon MD, Tequila Herrera MD, [place], [facility], [facility] Wong, CNP, Valerie Jefferson, [facility], Berenice Spicer, CNS, Zuri Benjamin, CNP, Lisa Alvarez, CNP, Mustapha Mariano, CNP, Nguyen Omalley, CNP, Vincent Parents, CNP, Anai Day PA-C, Nathalia Keith, [facility], Florin Spencer, CNP, Hugo Phoenix, CNP, Kelin Griffin, CNP, Marzena San, CNP, Jacklyn Frankel, [address]    Progress Note    11/6/2020    12:01 PM    Name:   Tonja Blackwell  MRN:     [id number]     [place]:      [de-identified]   Room:   2002/2002-01  IP Day:  1  Admit Date:  11/5/2020 12:40 AM    PCP:   No primary care provider on file. Code Status:  Full Code    Subjective:     C/C:  Chest pain     Interval History Status:  improved  Chest pain resolving  Feels good   \"ready to go home\"  Concerned about the cost of Brilinta    Data Base Updates:  Glucose 134High     Sodium 133Low     Pro-     ECG:  Normal sinus rhythm   Right superior axis deviation   Right ventricular hypertrophy   Abnormal ECG   When compared with ECG of 27-AUG-2015 13:09,   No significant change was found     Brief History:     The patient is a 63 y.o. male who is admitted to the hospital for the management of: Chest pain     The patient had presented at [facility] with:  Ciro Marie is a 63 y. o. male who presents complaining of chest pain.  Patient states that about an hour ago he started having what felt like an electrical sensation going across from the center of his chest and down to the upper portions of his bilateral arms.  Patient states the pain is still there he did take an aspirin and the pain is starting to resolve but it still there. Anita Cobian states he is not had any recent illnesses did not feel short of breath or have palpitations.  Patient has had no fevers.  Patient has had history of blood clots in the past and also strokes. \"     The patient continues to have pain that radiates from right to left elbow across to his chest  That seems to be exacerbated by movement  He has responded to nitroglycerin     The patient has been transferred from [facility] for cardiac catheterization today     Initial database has included:  Troponin has been as high as 88  Myoglobin 94     D-dimer 1.59     CTA:  1. No evidence of pulmonary embolism    2. 5 and 3 mm left lung nodule. 3. Diffuse emphysematous changes again seen throughout the lungs, with an    upper lobe predominance    4. No evidence of pneumonia      LDL Cholesterol 175High   Cholesterol 231High     On 11/5 the patient underwent cardiac catheterization:  Findings:   LMCA: Normal 0% stenosis. LAD: Single stenosis. Lesion on Prox LAD: Ostial.80% stenosis 12 mm length reduced to 0%. Pre     procedure AJ III flow was noted. Post Procedure AJ III flow was     present. Good runoff was present. The lesion was diagnosed as High Risk   (C). LCx: Normal 0% stenosis. RCA: Normal 0% stenosis. Ramus: Normal 0% stenosis. The LV gram was performed in the JACKSON 30 position. LVEF: 60 %. Conclusions:    Single vessel coronary artery disease. Successful PCI / Drug Eluting Stent of the ostial LAD    Normal LCX and RCA. Normal LV systolic function    The patient's condition was stabilized  Discharge planning initiated      Medications: Allergies:     Allergies   Allergen Reactions    Ativan [Lorazepam] Other (See Comments)     agitation       Current Meds:   Scheduled Meds:    heparin (porcine)  5,000 Units Subcutaneous 3 times per day    sodium chloride flush  10 mL Intravenous 2 times per day    metoprolol tartrate  12.5 mg Oral BID    atorvastatin  80 mg Oral Nightly    aspirin  81 mg Oral Daily    ticagrelor  90 mg Oral BID     Continuous Infusions:   PRN Meds: potassium chloride **OR** potassium alternative oral replacement **OR** potassium chloride, magnesium sulfate, acetaminophen **OR** acetaminophen, magnesium hydroxide, promethazine **OR** ondansetron, nitroGLYCERIN, morphine **OR** morphine, sodium chloride flush, acetaminophen, ondansetron, hydrALAZINE, labetalol    Data:     Past Medical History:   has a past medical history of Hepatitis C, Hyperlipidemia, Hypertension, L5 spinal cord injury ([facility] Pancreatitis, Pulmonary embolism ([facility] Stroke ([facility] and TIA (transient ischemic attack). Social History:   reports that he has been smoking. He has been smoking about 0.50 packs per day. He has never used smokeless tobacco. He reports that he does not drink alcohol or use drugs. Family History:   Family History   Problem Relation Age of Onset    Cancer Mother         non-hodgkins lymphoma    Heart Disease Father     Cancer Father         non-hodgkins lymphoma    Cancer Sister         non-hodgkins lymphoma    Cancer Maternal Grandmother         non-hodgkins lymphoma    Cancer Maternal Grandfather         non-hodgkins lymphoma    Cancer Paternal Grandmother         non-hodgkins lymphoma    Cancer Paternal Grandfather         non-hodgkins lymphoma       Review of Systems:     Review of Systems   Respiratory: Negative for cough and shortness of breath. Cardiovascular: Negative for chest pain (Resolved) and palpitations. Gastrointestinal: Negative for abdominal pain, nausea and vomiting. Genitourinary: Negative for flank pain and hematuria. Musculoskeletal: Positive for arthralgias, back pain and myalgias.         Has pain pump in place       Physical Examination:        Physical Exam  Vitals signs reviewed. Constitutional:       General: He is not in acute distress. Appearance: He is not diaphoretic. HENT:      Head: Normocephalic. Nose: Nose normal.   Eyes:      General: No scleral icterus. Conjunctiva/sclera: Conjunctivae normal.   Neck:      Musculoskeletal: Neck supple. Trachea: No tracheal deviation. Cardiovascular:      Rate and Rhythm: Normal rate and regular rhythm. Pulmonary:      Effort: Pulmonary effort is normal. No respiratory distress. Breath sounds: Normal breath sounds. No wheezing or rales. Chest:      Chest wall: No tenderness. Abdominal:      General: Bowel sounds are normal. There is no distension. Palpations: Abdomen is soft. Tenderness: There is no abdominal tenderness. Musculoskeletal:         General: No tenderness. Skin:     General: Skin is warm and dry. Vitals:  /62   Pulse 84   Temp 97.7 °F (36.5 °C) (Oral)   Resp 18   Ht 6' (1.829 m)   Wt 153 lb (69.4 kg)   SpO2 97%   BMI 20.75 kg/m²   Temp (24hrs), Av.1 °F (36.7 °C), Min:97.7 °F (36.5 °C), Max:98.3 °F (36.8 °C)    No results for input(s): POCGLU in the last 72 hours. I/O (24Hr):     Intake/Output Summary (Last 24 hours) at 2020 1201  Last data filed at 2020 0846  Gross per 24 hour   Intake 1050 ml   Output 1200 ml   Net -150 ml       Labs:  Hematology:  Recent Labs     20  1350 20  0303   WBC 5.4 10.6 7.6   RBC 5.03 5.40 4.83   HGB 15.3 16.0 14.3   HCT 44.9 47.3 43.5   MCV 89.4 87.6 90.1   MCH 30.5 29.6 29.6   MCHC 34.1 33.8 32.9   RDW 12.8 12.0 11.9    187 194   MPV 7.6 9.4 9.8   INR 1.0  --   --    DDIMER 1.59*  --   --      Chemistry:  Recent Labs     20  1350 20  1710  11/20     --   --   --   --   --  133*   K 4.0  --   --   --   --   --  4.2     --   --   --   --   --  103   CO2 24  --   --   --   --   --  17*   GLUCOSE 131*  -- --   --   --   --  134*   BUN 15  --   --   --   --   --  15   CREATININE 0.64*  --   --   --   --   --  0.75   MG  --   --   --   --   --   --  2.0   ANIONGAP 9  --   --   --   --   --  13   LABGLOM >60  --   --   --   --   --  >60   GFRAA >60  --   --   --   --   --  >60   CALCIUM 9.5  --   --   --   --   --  9.0   PROBNP 70  --   --   --   --   --  164   TROPHS 27* 70*   < > 58* 67* 74*  --    MYOGLOBIN 74* 94*  --   --   --   --   --     < > = values in this interval not displayed. Recent Labs     11/03/20  1350 11/06/20  0303   PROT  --  6.3*   LABALBU  --  3.7   TSH 1.28  --    AST  --  31   ALT  --  17   ALKPHOS  --  82   BILITOT  --  0.53   CHOL  --  231*   HDL  --  45   LDLCHOLESTEROL  --  175*   CHOLHDLRATIO  --  5.1*   TRIG  --  53   VLDL  --  NOT REPORTED     ABG:No results found for: POCPH, PHART, PH, POCPCO2, CDJ5QCP, PCO2, POCPO2, PO2ART, PO2, POCHCO3, GNF3WVQ, HCO3, NBEA, PBEA, BEART, BE, THGBART, THB, GPX0MAV, NWSY9DQQ, S4APWXEC, O2SAT, FIO2  No results found for: SPECIAL  No results found for: CULTURE    Radiology:  Toya Matson Device Plmt/replace/removal    Result Date: 11/3/2020  Successful ultrasound and fluoroscopy guided midline placement     Xr Chest Portable    Result Date: 11/3/2020  No acute cardiopulmonary pathology. Ct Chest Pulmonary Embolism W Contrast    Result Date: 11/3/2020  1. No evidence of pulmonary embolism 2. 5 and 3 mm left lung nodule. 3. Diffuse emphysematous changes again seen throughout the lungs, with an upper lobe predominance 4. No evidence of pneumonia RECOMMENDATIONS: Fleischner Society guidelines for follow-up and management of incidentally detected pulmonary nodules: Multiple Solid Nodules: Nodule size less than 6 mm: In a low-risk patient, no routine follow-up. - Low risk patients include individuals with minimal or absent history of smoking and other known risk factors.  - High risk patients include individuals with a history or smoking or known risk factors. Reference: Radiology 2017 http://pubs. rsna.org/doi/full/10.1148/radiol. 9148723885         Assessment:        Principal Problem:    NSTEMI (non-ST elevated myocardial infarction) [facility])  Active Problems:    Cerebral infarction due to embolism of left posterior cerebral artery (HCC)    Hyperglycemia    Tobacco use    Hx pulmonary embolism    Hx of hepatitis C    Chronic pancreatitis (HCC)    Hypertension    L5 spinal cord injury ([address]    Pancreatitis    Hypercholesterolemia    S/P angioplasty with stent  Resolved Problems:    * No resolved hospital problems. *      Plan:        The patient has been admitted  Aspirin   Lipitor  Metoprolol  Brilinta - the patient reports he may need financial assistance  Cardiology evaluation and follow-up as scheduled TCC  Rehab  Risk factor management / weight loss    Smoking cessation  Blood Pressure - Monitor and control  Monitor blood sugars  Pain management, follow-up Dr. Rhianna Melton  Will discharge when arrangements complete and ok with other services.   Follow-up with PCP in one week  Notify PCP of discharge       IP CONSULT TO CARDIOLOGY  IP CONSULT TO CARDIAC REHAB  IP CONSULT TO [address] [place] [address]  11/6/2020  12:01 PM

## 2020-11-11 LAB
EKG ATRIAL RATE: 63 BPM
EKG P AXIS: 31 DEGREES
EKG P-R INTERVAL: 180 MS
EKG Q-T INTERVAL: 424 MS
EKG QRS DURATION: 84 MS
EKG QTC CALCULATION (BAZETT): 433 MS
EKG R AXIS: 178 DEGREES
EKG T AXIS: 68 DEGREES
EKG VENTRICULAR RATE: 63 BPM

## 2020-12-02 ENCOUNTER — HOSPITAL ENCOUNTER (OUTPATIENT)
Dept: CARDIAC REHAB | Age: 60
Setting detail: THERAPIES SERIES
Discharge: HOME | End: 2020-12-02
Payer: MEDICARE

## 2020-12-02 VITALS — HEIGHT: 72 IN | WEIGHT: 158.8 LBS | BODY MASS INDEX: 21.51 KG/M2

## 2020-12-02 PROCEDURE — 93798 PHYS/QHP OP CAR RHAB W/ECG: CPT

## 2020-12-02 RX ORDER — CLOPIDOGREL BISULFATE 75 MG/1
75 TABLET ORAL DAILY
COMMUNITY

## 2020-12-02 ASSESSMENT — PATIENT HEALTH QUESTIONNAIRE - PHQ9
SUM OF ALL RESPONSES TO PHQ QUESTIONS 1-9: 0

## 2020-12-07 ENCOUNTER — HOSPITAL ENCOUNTER (OUTPATIENT)
Dept: CARDIAC REHAB | Age: 60
Setting detail: THERAPIES SERIES
Discharge: HOME | End: 2020-12-07
Payer: MEDICARE

## 2020-12-07 VITALS — WEIGHT: 157.5 LBS | BODY MASS INDEX: 21.36 KG/M2

## 2020-12-07 PROCEDURE — 93798 PHYS/QHP OP CAR RHAB W/ECG: CPT

## 2020-12-09 ENCOUNTER — HOSPITAL ENCOUNTER (OUTPATIENT)
Dept: CARDIAC REHAB | Age: 60
Setting detail: THERAPIES SERIES
Discharge: HOME | End: 2020-12-09
Payer: MEDICARE

## 2020-12-09 VITALS — WEIGHT: 160 LBS | BODY MASS INDEX: 21.7 KG/M2

## 2020-12-09 PROCEDURE — 93798 PHYS/QHP OP CAR RHAB W/ECG: CPT

## 2020-12-14 ENCOUNTER — HOSPITAL ENCOUNTER (OUTPATIENT)
Dept: CARDIAC REHAB | Age: 60
Setting detail: THERAPIES SERIES
Discharge: HOME | End: 2020-12-14
Payer: MEDICARE

## 2020-12-14 VITALS — BODY MASS INDEX: 21.84 KG/M2 | WEIGHT: 161 LBS

## 2020-12-14 PROCEDURE — 93798 PHYS/QHP OP CAR RHAB W/ECG: CPT

## 2020-12-16 ENCOUNTER — HOSPITAL ENCOUNTER (OUTPATIENT)
Dept: CARDIAC REHAB | Age: 60
Setting detail: THERAPIES SERIES
Discharge: HOME | End: 2020-12-16
Payer: MEDICARE

## 2020-12-16 VITALS — WEIGHT: 159.8 LBS | BODY MASS INDEX: 21.67 KG/M2

## 2020-12-16 PROCEDURE — 93798 PHYS/QHP OP CAR RHAB W/ECG: CPT

## 2020-12-21 ENCOUNTER — HOSPITAL ENCOUNTER (OUTPATIENT)
Dept: CARDIAC REHAB | Age: 60
Setting detail: THERAPIES SERIES
Discharge: HOME | End: 2020-12-21
Payer: MEDICARE

## 2020-12-21 VITALS — WEIGHT: 160.2 LBS | BODY MASS INDEX: 21.73 KG/M2

## 2020-12-21 PROCEDURE — 93798 PHYS/QHP OP CAR RHAB W/ECG: CPT

## 2020-12-23 ENCOUNTER — HOSPITAL ENCOUNTER (OUTPATIENT)
Dept: CARDIAC REHAB | Age: 60
Setting detail: THERAPIES SERIES
Discharge: HOME | End: 2020-12-23
Payer: MEDICARE

## 2020-12-23 VITALS — WEIGHT: 158 LBS | BODY MASS INDEX: 21.43 KG/M2

## 2020-12-23 PROCEDURE — 93798 PHYS/QHP OP CAR RHAB W/ECG: CPT

## 2020-12-28 ENCOUNTER — HOSPITAL ENCOUNTER (OUTPATIENT)
Dept: CARDIAC REHAB | Age: 60
Setting detail: THERAPIES SERIES
Discharge: HOME | End: 2020-12-28
Payer: MEDICARE

## 2020-12-28 VITALS — WEIGHT: 158.2 LBS | BODY MASS INDEX: 21.46 KG/M2

## 2020-12-28 PROCEDURE — 93798 PHYS/QHP OP CAR RHAB W/ECG: CPT

## 2020-12-30 ENCOUNTER — HOSPITAL ENCOUNTER (OUTPATIENT)
Dept: CARDIAC REHAB | Age: 60
Setting detail: THERAPIES SERIES
Discharge: HOME | End: 2020-12-30
Payer: MEDICARE

## 2020-12-30 VITALS — BODY MASS INDEX: 21.77 KG/M2 | WEIGHT: 160.5 LBS

## 2020-12-30 PROCEDURE — 93798 PHYS/QHP OP CAR RHAB W/ECG: CPT

## 2021-01-04 ENCOUNTER — HOSPITAL ENCOUNTER (OUTPATIENT)
Dept: CARDIAC REHAB | Age: 61
Setting detail: THERAPIES SERIES
Discharge: HOME | End: 2021-01-04
Payer: MEDICARE

## 2021-01-06 ENCOUNTER — HOSPITAL ENCOUNTER (OUTPATIENT)
Dept: CARDIAC REHAB | Age: 61
Setting detail: THERAPIES SERIES
Discharge: HOME | End: 2021-01-06
Payer: MEDICARE

## 2021-01-06 VITALS — WEIGHT: 164.7 LBS | BODY MASS INDEX: 22.31 KG/M2 | HEIGHT: 72 IN

## 2021-01-06 PROCEDURE — 93798 PHYS/QHP OP CAR RHAB W/ECG: CPT

## 2021-01-11 ENCOUNTER — HOSPITAL ENCOUNTER (OUTPATIENT)
Dept: CARDIAC REHAB | Age: 61
Setting detail: THERAPIES SERIES
Discharge: HOME | End: 2021-01-11
Payer: MEDICARE

## 2021-01-11 VITALS — WEIGHT: 166.7 LBS | BODY MASS INDEX: 22.61 KG/M2

## 2021-01-11 PROCEDURE — 93798 PHYS/QHP OP CAR RHAB W/ECG: CPT

## 2021-01-11 RX ORDER — LISINOPRIL 5 MG/1
5 TABLET ORAL
Status: ON HOLD | COMMUNITY
End: 2022-06-08 | Stop reason: HOSPADM

## 2021-01-13 ENCOUNTER — HOSPITAL ENCOUNTER (OUTPATIENT)
Dept: CARDIAC REHAB | Age: 61
Setting detail: THERAPIES SERIES
Discharge: HOME | End: 2021-01-13
Payer: MEDICARE

## 2021-01-13 VITALS — BODY MASS INDEX: 22.51 KG/M2 | WEIGHT: 166 LBS

## 2021-01-13 PROCEDURE — 93798 PHYS/QHP OP CAR RHAB W/ECG: CPT

## 2021-01-18 ENCOUNTER — HOSPITAL ENCOUNTER (OUTPATIENT)
Dept: CARDIAC REHAB | Age: 61
Setting detail: THERAPIES SERIES
Discharge: HOME | End: 2021-01-18
Payer: MEDICARE

## 2021-01-18 VITALS — BODY MASS INDEX: 22.92 KG/M2 | WEIGHT: 169 LBS

## 2021-01-18 PROCEDURE — 93798 PHYS/QHP OP CAR RHAB W/ECG: CPT

## 2021-01-20 ENCOUNTER — HOSPITAL ENCOUNTER (OUTPATIENT)
Dept: CARDIAC REHAB | Age: 61
Setting detail: THERAPIES SERIES
Discharge: HOME | End: 2021-01-20
Payer: MEDICARE

## 2021-01-20 VITALS — BODY MASS INDEX: 22.83 KG/M2 | WEIGHT: 168.3 LBS

## 2021-01-20 PROCEDURE — 93798 PHYS/QHP OP CAR RHAB W/ECG: CPT

## 2021-01-25 ENCOUNTER — HOSPITAL ENCOUNTER (OUTPATIENT)
Dept: CARDIAC REHAB | Age: 61
Setting detail: THERAPIES SERIES
Discharge: HOME | End: 2021-01-25
Payer: MEDICARE

## 2021-01-25 VITALS — BODY MASS INDEX: 23.06 KG/M2 | WEIGHT: 170 LBS

## 2021-01-25 PROCEDURE — 93798 PHYS/QHP OP CAR RHAB W/ECG: CPT

## 2021-01-27 ENCOUNTER — HOSPITAL ENCOUNTER (OUTPATIENT)
Dept: CARDIAC REHAB | Age: 61
Setting detail: THERAPIES SERIES
Discharge: HOME | End: 2021-01-27
Payer: MEDICARE

## 2021-01-27 VITALS — BODY MASS INDEX: 22.92 KG/M2 | WEIGHT: 169 LBS

## 2021-01-27 PROCEDURE — 93798 PHYS/QHP OP CAR RHAB W/ECG: CPT

## 2021-02-01 ENCOUNTER — HOSPITAL ENCOUNTER (OUTPATIENT)
Dept: CARDIAC REHAB | Age: 61
Setting detail: THERAPIES SERIES
Discharge: HOME | End: 2021-02-01
Payer: MEDICARE

## 2021-02-01 VITALS — BODY MASS INDEX: 23.06 KG/M2 | WEIGHT: 170 LBS

## 2021-02-01 PROCEDURE — 93798 PHYS/QHP OP CAR RHAB W/ECG: CPT

## 2021-02-03 ENCOUNTER — HOSPITAL ENCOUNTER (OUTPATIENT)
Dept: CARDIAC REHAB | Age: 61
Setting detail: THERAPIES SERIES
Discharge: HOME | End: 2021-02-03
Payer: MEDICARE

## 2021-02-03 VITALS — WEIGHT: 168 LBS | BODY MASS INDEX: 22.78 KG/M2

## 2021-02-03 PROCEDURE — 93798 PHYS/QHP OP CAR RHAB W/ECG: CPT

## 2021-02-08 ENCOUNTER — HOSPITAL ENCOUNTER (OUTPATIENT)
Dept: CARDIAC REHAB | Age: 61
Setting detail: THERAPIES SERIES
Discharge: HOME | End: 2021-02-08
Payer: MEDICARE

## 2021-02-08 VITALS — WEIGHT: 168 LBS | BODY MASS INDEX: 22.78 KG/M2

## 2021-02-08 PROCEDURE — 93798 PHYS/QHP OP CAR RHAB W/ECG: CPT

## 2021-02-10 ENCOUNTER — HOSPITAL ENCOUNTER (OUTPATIENT)
Dept: CARDIAC REHAB | Age: 61
Setting detail: THERAPIES SERIES
Discharge: HOME | End: 2021-02-10
Payer: MEDICARE

## 2021-02-10 VITALS — WEIGHT: 167 LBS | BODY MASS INDEX: 22.65 KG/M2

## 2021-02-10 PROCEDURE — 93798 PHYS/QHP OP CAR RHAB W/ECG: CPT

## 2021-02-15 ENCOUNTER — HOSPITAL ENCOUNTER (OUTPATIENT)
Dept: CARDIAC REHAB | Age: 61
Setting detail: THERAPIES SERIES
Discharge: HOME | End: 2021-02-15
Payer: MEDICARE

## 2021-02-17 ENCOUNTER — HOSPITAL ENCOUNTER (OUTPATIENT)
Dept: CARDIAC REHAB | Age: 61
Setting detail: THERAPIES SERIES
Discharge: HOME | End: 2021-02-17
Payer: MEDICARE

## 2021-02-17 VITALS — WEIGHT: 167 LBS | BODY MASS INDEX: 22.65 KG/M2

## 2021-02-17 PROCEDURE — 93798 PHYS/QHP OP CAR RHAB W/ECG: CPT

## 2021-02-22 ENCOUNTER — HOSPITAL ENCOUNTER (OUTPATIENT)
Dept: CARDIAC REHAB | Age: 61
Setting detail: THERAPIES SERIES
Discharge: HOME | End: 2021-02-22
Payer: MEDICARE

## 2021-02-24 ENCOUNTER — HOSPITAL ENCOUNTER (OUTPATIENT)
Dept: CARDIAC REHAB | Age: 61
Setting detail: THERAPIES SERIES
Discharge: HOME | End: 2021-02-24
Payer: MEDICARE

## 2021-02-24 VITALS — BODY MASS INDEX: 23.06 KG/M2 | WEIGHT: 170 LBS

## 2021-02-24 PROCEDURE — 93798 PHYS/QHP OP CAR RHAB W/ECG: CPT

## 2021-03-01 ENCOUNTER — HOSPITAL ENCOUNTER (OUTPATIENT)
Dept: CARDIAC REHAB | Age: 61
Setting detail: THERAPIES SERIES
Discharge: HOME | End: 2021-03-01
Payer: MEDICARE

## 2021-03-01 VITALS — BODY MASS INDEX: 22.99 KG/M2 | WEIGHT: 169.5 LBS

## 2021-03-01 PROCEDURE — 93798 PHYS/QHP OP CAR RHAB W/ECG: CPT

## 2021-03-03 ENCOUNTER — HOSPITAL ENCOUNTER (OUTPATIENT)
Dept: CARDIAC REHAB | Age: 61
Setting detail: THERAPIES SERIES
Discharge: HOME | End: 2021-03-03
Payer: MEDICARE

## 2021-03-03 VITALS — WEIGHT: 169 LBS | BODY MASS INDEX: 22.92 KG/M2

## 2021-03-03 PROCEDURE — 93798 PHYS/QHP OP CAR RHAB W/ECG: CPT

## 2021-03-08 ENCOUNTER — HOSPITAL ENCOUNTER (OUTPATIENT)
Dept: CARDIAC REHAB | Age: 61
Setting detail: THERAPIES SERIES
Discharge: HOME | End: 2021-03-08
Payer: MEDICARE

## 2021-03-08 VITALS — HEIGHT: 72 IN | BODY MASS INDEX: 23.03 KG/M2 | WEIGHT: 170 LBS

## 2021-03-08 PROCEDURE — 93798 PHYS/QHP OP CAR RHAB W/ECG: CPT

## 2021-03-10 ENCOUNTER — HOSPITAL ENCOUNTER (OUTPATIENT)
Dept: CARDIAC REHAB | Age: 61
Setting detail: THERAPIES SERIES
Discharge: HOME | End: 2021-03-10
Payer: MEDICARE

## 2021-03-10 VITALS — BODY MASS INDEX: 23.6 KG/M2 | WEIGHT: 174 LBS

## 2021-03-10 PROCEDURE — 93798 PHYS/QHP OP CAR RHAB W/ECG: CPT

## 2021-03-15 ENCOUNTER — HOSPITAL ENCOUNTER (OUTPATIENT)
Dept: CARDIAC REHAB | Age: 61
Setting detail: THERAPIES SERIES
Discharge: HOME | End: 2021-03-15
Payer: MEDICARE

## 2021-03-15 VITALS — WEIGHT: 169 LBS | BODY MASS INDEX: 22.92 KG/M2

## 2021-03-15 PROCEDURE — 93798 PHYS/QHP OP CAR RHAB W/ECG: CPT

## 2021-03-17 ENCOUNTER — HOSPITAL ENCOUNTER (OUTPATIENT)
Dept: CARDIAC REHAB | Age: 61
Setting detail: THERAPIES SERIES
Discharge: HOME | End: 2021-03-17
Payer: MEDICARE

## 2021-03-17 VITALS — WEIGHT: 169 LBS | BODY MASS INDEX: 22.92 KG/M2

## 2021-03-17 PROCEDURE — 93798 PHYS/QHP OP CAR RHAB W/ECG: CPT

## 2021-03-22 ENCOUNTER — HOSPITAL ENCOUNTER (OUTPATIENT)
Dept: CARDIAC REHAB | Age: 61
Setting detail: THERAPIES SERIES
Discharge: HOME | End: 2021-03-22
Payer: MEDICARE

## 2021-03-22 VITALS — WEIGHT: 169.3 LBS | BODY MASS INDEX: 22.96 KG/M2

## 2021-03-22 PROCEDURE — 93798 PHYS/QHP OP CAR RHAB W/ECG: CPT

## 2021-03-24 ENCOUNTER — HOSPITAL ENCOUNTER (OUTPATIENT)
Dept: CARDIAC REHAB | Age: 61
Setting detail: THERAPIES SERIES
Discharge: HOME | End: 2021-03-24
Payer: MEDICARE

## 2021-03-24 VITALS — BODY MASS INDEX: 23.06 KG/M2 | WEIGHT: 170 LBS

## 2021-03-24 PROCEDURE — 93798 PHYS/QHP OP CAR RHAB W/ECG: CPT

## 2021-03-29 ENCOUNTER — HOSPITAL ENCOUNTER (OUTPATIENT)
Dept: CARDIAC REHAB | Age: 61
Setting detail: THERAPIES SERIES
Discharge: HOME | End: 2021-03-29
Payer: MEDICARE

## 2021-03-29 VITALS — BODY MASS INDEX: 23.06 KG/M2 | WEIGHT: 170 LBS

## 2021-03-29 PROCEDURE — 93798 PHYS/QHP OP CAR RHAB W/ECG: CPT

## 2021-03-31 ENCOUNTER — HOSPITAL ENCOUNTER (OUTPATIENT)
Dept: CARDIAC REHAB | Age: 61
Setting detail: THERAPIES SERIES
Discharge: HOME | End: 2021-03-31
Payer: MEDICARE

## 2021-03-31 VITALS — WEIGHT: 166 LBS | BODY MASS INDEX: 22.51 KG/M2

## 2021-03-31 PROCEDURE — 93798 PHYS/QHP OP CAR RHAB W/ECG: CPT

## 2021-04-05 ENCOUNTER — HOSPITAL ENCOUNTER (OUTPATIENT)
Dept: CARDIAC REHAB | Age: 61
Setting detail: THERAPIES SERIES
Discharge: HOME | End: 2021-04-05
Payer: MEDICARE

## 2021-04-05 VITALS — WEIGHT: 171 LBS | BODY MASS INDEX: 23.19 KG/M2

## 2021-04-05 PROCEDURE — 93798 PHYS/QHP OP CAR RHAB W/ECG: CPT

## 2021-04-07 ENCOUNTER — HOSPITAL ENCOUNTER (OUTPATIENT)
Dept: CARDIAC REHAB | Age: 61
Setting detail: THERAPIES SERIES
Discharge: HOME | End: 2021-04-07
Payer: MEDICARE

## 2021-04-07 VITALS — BODY MASS INDEX: 23.19 KG/M2 | WEIGHT: 171 LBS

## 2021-04-07 PROCEDURE — 93798 PHYS/QHP OP CAR RHAB W/ECG: CPT

## 2021-04-12 ENCOUNTER — HOSPITAL ENCOUNTER (OUTPATIENT)
Dept: CARDIAC REHAB | Age: 61
Setting detail: THERAPIES SERIES
Discharge: HOME | End: 2021-04-12
Payer: MEDICARE

## 2021-04-12 VITALS — WEIGHT: 172 LBS | BODY MASS INDEX: 23.33 KG/M2

## 2021-04-12 PROCEDURE — 93798 PHYS/QHP OP CAR RHAB W/ECG: CPT

## 2021-04-14 ENCOUNTER — HOSPITAL ENCOUNTER (OUTPATIENT)
Dept: CARDIAC REHAB | Age: 61
Setting detail: THERAPIES SERIES
Discharge: HOME | End: 2021-04-14
Payer: MEDICARE

## 2021-04-14 VITALS — WEIGHT: 172 LBS | HEIGHT: 72 IN | BODY MASS INDEX: 23.3 KG/M2

## 2021-04-14 PROCEDURE — 93798 PHYS/QHP OP CAR RHAB W/ECG: CPT

## 2021-04-19 ENCOUNTER — HOSPITAL ENCOUNTER (OUTPATIENT)
Age: 61
Discharge: HOME | End: 2021-04-19
Payer: MEDICARE

## 2021-04-19 LAB
ALBUMIN SERPL-MCNC: 4 G/DL (ref 3.5–5.2)
ALBUMIN/GLOBULIN RATIO: ABNORMAL (ref 1–2.5)
ALP BLD-CCNC: 103 U/L (ref 40–129)
ALT SERPL-CCNC: 31 U/L (ref 5–41)
ANION GAP SERPL CALCULATED.3IONS-SCNC: 7 MMOL/L (ref 9–17)
AST SERPL-CCNC: 29 U/L
BILIRUB SERPL-MCNC: 0.44 MG/DL (ref 0.3–1.2)
BUN BLDV-MCNC: 15 MG/DL (ref 8–23)
BUN/CREAT BLD: ABNORMAL (ref 9–20)
CALCIUM SERPL-MCNC: 9.4 MG/DL (ref 8.6–10.4)
CHLORIDE BLD-SCNC: 105 MMOL/L (ref 98–107)
CHOLESTEROL/HDL RATIO: 3.4
CHOLESTEROL: 135 MG/DL
CO2: 26 MMOL/L (ref 20–31)
CREAT SERPL-MCNC: 0.74 MG/DL (ref 0.7–1.2)
GFR AFRICAN AMERICAN: >60 ML/MIN
GFR NON-AFRICAN AMERICAN: >60 ML/MIN
GFR SERPL CREATININE-BSD FRML MDRD: ABNORMAL ML/MIN/{1.73_M2}
GFR SERPL CREATININE-BSD FRML MDRD: ABNORMAL ML/MIN/{1.73_M2}
GLUCOSE BLD-MCNC: 111 MG/DL (ref 70–99)
HDLC SERPL-MCNC: 40 MG/DL
LDL CHOLESTEROL: 75 MG/DL (ref 0–130)
POTASSIUM SERPL-SCNC: 4.2 MMOL/L (ref 3.7–5.3)
SODIUM BLD-SCNC: 138 MMOL/L (ref 135–144)
TOTAL PROTEIN: 6.8 G/DL (ref 6.4–8.3)
TRIGL SERPL-MCNC: 98 MG/DL
VLDLC SERPL CALC-MCNC: ABNORMAL MG/DL (ref 1–30)

## 2021-04-19 PROCEDURE — 36415 COLL VENOUS BLD VENIPUNCTURE: CPT

## 2021-04-19 PROCEDURE — 80061 LIPID PANEL: CPT

## 2021-04-19 PROCEDURE — 80053 COMPREHEN METABOLIC PANEL: CPT

## 2021-06-19 ENCOUNTER — HOSPITAL ENCOUNTER (EMERGENCY)
Age: 61
Discharge: HOME | End: 2021-06-19
Attending: EMERGENCY MEDICINE
Payer: MEDICARE

## 2021-06-19 VITALS
DIASTOLIC BLOOD PRESSURE: 69 MMHG | TEMPERATURE: 98.1 F | RESPIRATION RATE: 16 BRPM | HEIGHT: 72 IN | WEIGHT: 174 LBS | OXYGEN SATURATION: 97 % | BODY MASS INDEX: 23.57 KG/M2 | HEART RATE: 65 BPM | SYSTOLIC BLOOD PRESSURE: 127 MMHG

## 2021-06-19 DIAGNOSIS — L25.9 CONTACT DERMATITIS, UNSPECIFIED CONTACT DERMATITIS TYPE, UNSPECIFIED TRIGGER: Primary | ICD-10-CM

## 2021-06-19 PROCEDURE — 96372 THER/PROPH/DIAG INJ SC/IM: CPT

## 2021-06-19 PROCEDURE — 99284 EMERGENCY DEPT VISIT MOD MDM: CPT

## 2021-06-19 PROCEDURE — 6360000002 HC RX W HCPCS: Performed by: EMERGENCY MEDICINE

## 2021-06-19 RX ORDER — PREDNISONE 10 MG/1
TABLET ORAL
Qty: 20 TABLET | Refills: 0 | Status: SHIPPED | OUTPATIENT
Start: 2021-06-20 | End: 2021-06-24

## 2021-06-19 RX ORDER — DEXAMETHASONE SODIUM PHOSPHATE 4 MG/ML
4 INJECTION, SOLUTION INTRA-ARTICULAR; INTRALESIONAL; INTRAMUSCULAR; INTRAVENOUS; SOFT TISSUE ONCE
Status: COMPLETED | OUTPATIENT
Start: 2021-06-19 | End: 2021-06-19

## 2021-06-19 RX ADMIN — DEXAMETHASONE SODIUM PHOSPHATE 4 MG: 4 INJECTION, SOLUTION INTRAMUSCULAR; INTRAVENOUS at 08:10

## 2021-06-19 ASSESSMENT — ENCOUNTER SYMPTOMS
EYE REDNESS: 0
NAUSEA: 0
VOMITING: 0

## 2021-06-19 ASSESSMENT — PAIN DESCRIPTION - DESCRIPTORS: DESCRIPTORS: ITCHING

## 2021-06-19 ASSESSMENT — PAIN SCALES - GENERAL: PAINLEVEL_OUTOF10: 9

## 2021-06-19 NOTE — ED PROVIDER NOTES
[address]  EMERGENCY DEPARTMENT ENCOUNTER      Pt Name: Mu Gates  MRN: [id number]  [place] 1960  Date of evaluation: 6/19/21      CHIEF COMPLAINT       Chief Complaint   Patient presents with    Facial Swelling     Lt periorbital swelling     HISTORY OF PRESENT ILLNESS   HPI 57 y.o. male presents with c/o skin rash. The patient reports that he was gardening yesterday. States that he woke up with an itchy skin rash around his left eye, left hand and his left knee. Rates the discomfort as severe. He took some over-the-counter allergy drops and it helped out with some of the swelling around his eyelid. No visual change. No oral swelling. No fevers or chills. No nausea or vomiting. [employer] REVIEW OF SYSTEMS     Review of Systems   Constitutional: Negative for fever. HENT: Negative for congestion. Eyes: Negative for redness and visual disturbance. Gastrointestinal: Negative for nausea and vomiting. Skin: Positive for rash. Negative for wound.          PAST MEDICAL HISTORY     Past Medical History:   Diagnosis Date    CAD (coronary artery disease)     Hepatitis C     Hyperlipidemia     Hypertension     L5 spinal cord injury ([facility]     problem with right leg nerve    MI (myocardial infarction) ([facility]     Pancreatitis     Pulmonary embolism (HCC)     Stroke ([facility]     TIA (transient ischemic attack)        SURGICAL HISTORY       Past Surgical History:   Procedure Laterality Date    BACK SURGERY      LIVER BIOPSY         CURRENT MEDICATIONS       Previous Medications    ASPIRIN 81 MG CHEWABLE TABLET    Take 1 tablet by mouth daily    ATORVASTATIN (LIPITOR) 80 MG TABLET    Take 1 tablet by mouth nightly    CLOPIDOGREL (PLAVIX) 75 MG TABLET    Take 75 mg by mouth daily    GLUCOSAMINE SULFATE-MSM (MSM-GLUCOSAMINE EX)    Apply topically    LISINOPRIL (PRINIVIL;ZESTRIL) 5 MG TABLET    Take 2.5 mg by mouth daily     METOPROLOL TARTRATE (LOPRESSOR) 25 MG TABLET    Take 0.5 tablets by mouth 2 times daily    NITROGLYCERIN (NITROSTAT) 0.4 MG SL TABLET    up to max of 3 total doses. If no relief after 1 dose, call 911. ALLERGIES     is allergic to ativan [lorazepam]. FAMILY HISTORY     He indicated that the status of his mother is unknown. He indicated that the status of his father is unknown. He indicated that the status of his sister is unknown. He indicated that the status of his maternal grandmother is unknown. He indicated that the status of his maternal grandfather is unknown. He indicated that the status of his paternal grandmother is unknown. He indicated that the status of his paternal grandfather is unknown. SOCIAL HISTORY      reports that he quit smoking about 6 months ago. His smoking use included cigarettes. He has a 66.00 pack-year smoking history. He has never used smokeless tobacco. He reports that he does not drink alcohol and does not use drugs. PHYSICAL EXAM     INITIAL VITALS: /69   Pulse 65   Temp 98.1 °F (36.7 °C) (Oral)   Resp 16   Ht 6' (1.829 m)   Wt 174 lb (78.9 kg)   SpO2 97%   BMI 23.60 kg/m²   Gen: NAD  Head: Normocephalic, atraumatic  Eye: Pupils equal round reactive to light, no conjunctivitis, there is erythema and a papular rash on the left side of the face and the left eyebrow area. ENT: MMM, no edema to the lips tongue uvula or pharynx. Neck: No adenopathy or edema. Heart: Regular rate and rhythm no murmurs  Lungs: Clear to auscultation bilaterally, no respiratory distress  Skin. There are some small papules to the left hand and also to the left knee, otherwise no signs of cellulitis. Neurologic: Patient is alert and oriented x3,  fluent speech    MEDICAL DECISION MAKING:     MDM  57 y.o. male presenting with a contact dermatitis. I suspect that is likely poison ivy or sumac. There is no sign of any angioedema. No evidence of any conjunctivitis or periorbital cellulitis. We will treat him with some steroids.   Discussed symptomatic control. Discussed warning precautions for emergency department return. Discussed importance of outpatient follow-up with his primary care provider. DIAGNOSTIC RESULTS     EMERGENCY DEPARTMENT COURSE:   Vitals:    Vitals:    06/19/21 0731   BP: 127/69   Pulse: 65   Resp: 16   Temp: 98.1 °F (36.7 °C)   TempSrc: Oral   SpO2: 97%   Weight: 174 lb (78.9 kg)   Height: 6' (1.829 m)       The patient was given the following medications while in the emergency department:  Orders Placed This Encounter   Medications    dexamethasone (DECADRON) injection 4 mg    predniSONE (DELTASONE) 10 MG tablet     Sig: Take 4 tablets by mouth once daily for 5 days     Dispense:  20 tablet     Refill:  0     -------------------------  CRITICAL CARE:   CONSULTS: None  PROCEDURES: Procedures     FINAL IMPRESSION      1.  Contact dermatitis, unspecified contact dermatitis type, unspecified trigger          DISPOSITION/PLAN   DISPOSITION Decision To Discharge 06/19/2021 07:48:12 AM      PATIENT REFERRED TO:  Your Primary Care Provider    In 1 week      [address] [id number] [phone]    If symptoms worsen      DISCHARGE MEDICATIONS:  New Prescriptions    PREDNISONE (DELTASONE) 10 MG TABLET    Take 4 tablets by mouth once daily for 5 days         Izetta Ormond, MD  Attending Emergency Physician                      Izetta Ormond, MD  06/19/21 1129

## 2022-01-03 ENCOUNTER — HOSPITAL ENCOUNTER (OUTPATIENT)
Age: 62
Discharge: HOME | End: 2022-01-03
Payer: MEDICARE

## 2022-01-03 LAB
ALBUMIN SERPL-MCNC: 4.2 G/DL (ref 3.5–5.2)
ALBUMIN/GLOBULIN RATIO: ABNORMAL (ref 1–2.5)
ALP BLD-CCNC: 108 U/L (ref 40–129)
ALT SERPL-CCNC: 34 U/L (ref 5–41)
ANION GAP SERPL CALCULATED.3IONS-SCNC: 12 MMOL/L (ref 9–17)
AST SERPL-CCNC: 32 U/L
BILIRUB SERPL-MCNC: 0.34 MG/DL (ref 0.3–1.2)
BUN BLDV-MCNC: 17 MG/DL (ref 8–23)
BUN/CREAT BLD: ABNORMAL (ref 9–20)
CALCIUM SERPL-MCNC: 9.8 MG/DL (ref 8.6–10.4)
CHLORIDE BLD-SCNC: 105 MMOL/L (ref 98–107)
CHOLESTEROL, FASTING: 118 MG/DL
CHOLESTEROL/HDL RATIO: 2.7
CO2: 24 MMOL/L (ref 20–31)
CREAT SERPL-MCNC: 0.87 MG/DL (ref 0.7–1.2)
GFR AFRICAN AMERICAN: >60 ML/MIN
GFR NON-AFRICAN AMERICAN: >60 ML/MIN
GFR SERPL CREATININE-BSD FRML MDRD: ABNORMAL ML/MIN/{1.73_M2}
GFR SERPL CREATININE-BSD FRML MDRD: ABNORMAL ML/MIN/{1.73_M2}
GLUCOSE FASTING: 108 MG/DL (ref 70–99)
HDLC SERPL-MCNC: 44 MG/DL
LDL CHOLESTEROL: 59 MG/DL (ref 0–130)
POTASSIUM SERPL-SCNC: 5 MMOL/L (ref 3.7–5.3)
SODIUM BLD-SCNC: 141 MMOL/L (ref 135–144)
TOTAL PROTEIN: 7.1 G/DL (ref 6.4–8.3)
TRIGLYCERIDE, FASTING: 74 MG/DL
VLDLC SERPL CALC-MCNC: NORMAL MG/DL (ref 1–30)

## 2022-01-03 PROCEDURE — 80061 LIPID PANEL: CPT

## 2022-01-03 PROCEDURE — 80053 COMPREHEN METABOLIC PANEL: CPT

## 2022-01-03 PROCEDURE — 36415 COLL VENOUS BLD VENIPUNCTURE: CPT

## 2022-06-06 ENCOUNTER — HOSPITAL ENCOUNTER (OUTPATIENT)
Age: 62
Setting detail: OBSERVATION
Discharge: HOME | End: 2022-06-08
Attending: STUDENT IN AN ORGANIZED HEALTH CARE EDUCATION/TRAINING PROGRAM | Admitting: INTERNAL MEDICINE
Payer: MEDICARE

## 2022-06-06 ENCOUNTER — APPOINTMENT (OUTPATIENT)
Dept: MRI IMAGING | Age: 62
End: 2022-06-06
Payer: MEDICARE

## 2022-06-06 ENCOUNTER — APPOINTMENT (OUTPATIENT)
Dept: CT IMAGING | Age: 62
End: 2022-06-06
Payer: MEDICARE

## 2022-06-06 DIAGNOSIS — R42 DIZZINESS: Primary | ICD-10-CM

## 2022-06-06 LAB
ABSOLUTE EOS #: 0.1 K/UL (ref 0–0.4)
ABSOLUTE LYMPH #: 1.6 K/UL (ref 1–4.8)
ABSOLUTE MONO #: 0.5 K/UL (ref 0.1–1.3)
ANION GAP SERPL CALCULATED.3IONS-SCNC: 14 MMOL/L (ref 9–17)
BASOPHILS # BLD: 1 % (ref 0–2)
BASOPHILS ABSOLUTE: 0.1 K/UL (ref 0–0.2)
BUN BLDV-MCNC: 13 MG/DL (ref 8–23)
CALCIUM SERPL-MCNC: 9.3 MG/DL (ref 8.6–10.4)
CHLORIDE BLD-SCNC: 105 MMOL/L (ref 98–107)
CO2: 19 MMOL/L (ref 20–31)
CREAT SERPL-MCNC: 0.96 MG/DL (ref 0.7–1.2)
EOSINOPHILS RELATIVE PERCENT: 2 % (ref 0–4)
GFR AFRICAN AMERICAN: >60 ML/MIN
GFR NON-AFRICAN AMERICAN: >60 ML/MIN
GFR NON-AFRICAN AMERICAN: >60 ML/MIN
GFR SERPL CREATININE-BSD FRML MDRD: >60 ML/MIN
GFR SERPL CREATININE-BSD FRML MDRD: ABNORMAL ML/MIN/{1.73_M2}
GFR SERPL CREATININE-BSD FRML MDRD: NORMAL ML/MIN/{1.73_M2}
GLUCOSE BLD-MCNC: 120 MG/DL (ref 75–110)
GLUCOSE BLD-MCNC: 123 MG/DL (ref 70–99)
HCT VFR BLD CALC: 40.5 % (ref 41–53)
HEMOGLOBIN: 13.8 G/DL (ref 13.5–17.5)
INR BLD: 1
LYMPHOCYTES # BLD: 31 % (ref 24–44)
MCH RBC QN AUTO: 29.7 PG (ref 26–34)
MCHC RBC AUTO-ENTMCNC: 34.1 G/DL (ref 31–37)
MCV RBC AUTO: 87 FL (ref 80–100)
MONOCYTES # BLD: 9 % (ref 1–7)
MYOGLOBIN: 59 NG/ML (ref 28–72)
PARTIAL THROMBOPLASTIN TIME: 24.9 SEC (ref 24–36)
PDW BLD-RTO: 13.1 % (ref 11.5–14.9)
PLATELET # BLD: 221 K/UL (ref 150–450)
PMV BLD AUTO: 7.2 FL (ref 6–12)
POC CREATININE: 1.02 MG/DL (ref 0.51–1.19)
POTASSIUM SERPL-SCNC: 4 MMOL/L (ref 3.7–5.3)
PROTHROMBIN TIME: 13.4 SEC (ref 11.8–14.6)
RBC # BLD: 4.66 M/UL (ref 4.5–5.9)
SEG NEUTROPHILS: 57 % (ref 36–66)
SEGMENTED NEUTROPHILS ABSOLUTE COUNT: 3 K/UL (ref 1.3–9.1)
SODIUM BLD-SCNC: 138 MMOL/L (ref 135–144)
TOTAL CK: 98 U/L (ref 39–308)
TROPONIN, HIGH SENSITIVITY: 13 NG/L (ref 0–22)
WBC # BLD: 5.4 K/UL (ref 3.5–11)

## 2022-06-06 PROCEDURE — G0378 HOSPITAL OBSERVATION PER HR: HCPCS

## 2022-06-06 PROCEDURE — 85730 THROMBOPLASTIN TIME PARTIAL: CPT

## 2022-06-06 PROCEDURE — 85610 PROTHROMBIN TIME: CPT

## 2022-06-06 PROCEDURE — 84484 ASSAY OF TROPONIN QUANT: CPT

## 2022-06-06 PROCEDURE — 70496 CT ANGIOGRAPHY HEAD: CPT

## 2022-06-06 PROCEDURE — 70551 MRI BRAIN STEM W/O DYE: CPT

## 2022-06-06 PROCEDURE — 80048 BASIC METABOLIC PNL TOTAL CA: CPT

## 2022-06-06 PROCEDURE — 82565 ASSAY OF CREATININE: CPT

## 2022-06-06 PROCEDURE — 96374 THER/PROPH/DIAG INJ IV PUSH: CPT

## 2022-06-06 PROCEDURE — 82947 ASSAY GLUCOSE BLOOD QUANT: CPT

## 2022-06-06 PROCEDURE — 99285 EMERGENCY DEPT VISIT HI MDM: CPT

## 2022-06-06 PROCEDURE — 70450 CT HEAD/BRAIN W/O DYE: CPT

## 2022-06-06 PROCEDURE — 36415 COLL VENOUS BLD VENIPUNCTURE: CPT

## 2022-06-06 PROCEDURE — 85025 COMPLETE CBC W/AUTO DIFF WBC: CPT

## 2022-06-06 PROCEDURE — 82550 ASSAY OF CK (CPK): CPT

## 2022-06-06 PROCEDURE — 99449 NTRPROF PH1/NTRNET/EHR 31/>: CPT | Performed by: PSYCHIATRY & NEUROLOGY

## 2022-06-06 PROCEDURE — 93005 ELECTROCARDIOGRAM TRACING: CPT | Performed by: STUDENT IN AN ORGANIZED HEALTH CARE EDUCATION/TRAINING PROGRAM

## 2022-06-06 PROCEDURE — 96376 TX/PRO/DX INJ SAME DRUG ADON: CPT

## 2022-06-06 PROCEDURE — 83874 ASSAY OF MYOGLOBIN: CPT

## 2022-06-06 PROCEDURE — 6370000000 HC RX 637 (ALT 250 FOR IP): Performed by: STUDENT IN AN ORGANIZED HEALTH CARE EDUCATION/TRAINING PROGRAM

## 2022-06-06 PROCEDURE — 6360000004 HC RX CONTRAST MEDICATION: Performed by: STUDENT IN AN ORGANIZED HEALTH CARE EDUCATION/TRAINING PROGRAM

## 2022-06-06 PROCEDURE — 2580000003 HC RX 258: Performed by: STUDENT IN AN ORGANIZED HEALTH CARE EDUCATION/TRAINING PROGRAM

## 2022-06-06 PROCEDURE — 96375 TX/PRO/DX INJ NEW DRUG ADDON: CPT

## 2022-06-06 PROCEDURE — 82553 CREATINE MB FRACTION: CPT

## 2022-06-06 PROCEDURE — 6360000002 HC RX W HCPCS: Performed by: STUDENT IN AN ORGANIZED HEALTH CARE EDUCATION/TRAINING PROGRAM

## 2022-06-06 RX ORDER — ONDANSETRON 4 MG/1
4 TABLET, ORALLY DISINTEGRATING ORAL EVERY 8 HOURS PRN
Status: DISCONTINUED | OUTPATIENT
Start: 2022-06-06 | End: 2022-06-08 | Stop reason: HOSPADM

## 2022-06-06 RX ORDER — ENOXAPARIN SODIUM 100 MG/ML
40 INJECTION SUBCUTANEOUS DAILY
Status: DISCONTINUED | OUTPATIENT
Start: 2022-06-07 | End: 2022-06-08 | Stop reason: HOSPADM

## 2022-06-06 RX ORDER — MORPHINE SULFATE 4 MG/ML
4 INJECTION, SOLUTION INTRAMUSCULAR; INTRAVENOUS ONCE
Status: COMPLETED | OUTPATIENT
Start: 2022-06-06 | End: 2022-06-06

## 2022-06-06 RX ORDER — ASPIRIN 300 MG/1
300 SUPPOSITORY RECTAL DAILY
Status: DISCONTINUED | OUTPATIENT
Start: 2022-06-07 | End: 2022-06-08 | Stop reason: HOSPADM

## 2022-06-06 RX ORDER — ONDANSETRON 2 MG/ML
4 INJECTION INTRAMUSCULAR; INTRAVENOUS EVERY 6 HOURS PRN
Status: DISCONTINUED | OUTPATIENT
Start: 2022-06-06 | End: 2022-06-08 | Stop reason: HOSPADM

## 2022-06-06 RX ORDER — ATORVASTATIN CALCIUM 80 MG/1
80 TABLET, FILM COATED ORAL NIGHTLY
Status: DISCONTINUED | OUTPATIENT
Start: 2022-06-06 | End: 2022-06-08 | Stop reason: HOSPADM

## 2022-06-06 RX ORDER — SODIUM CHLORIDE 0.9 % (FLUSH) 0.9 %
10 SYRINGE (ML) INJECTION PRN
Status: DISCONTINUED | OUTPATIENT
Start: 2022-06-06 | End: 2022-06-08 | Stop reason: HOSPADM

## 2022-06-06 RX ORDER — 0.9 % SODIUM CHLORIDE 0.9 %
80 INTRAVENOUS SOLUTION INTRAVENOUS ONCE
Status: COMPLETED | OUTPATIENT
Start: 2022-06-06 | End: 2022-06-06

## 2022-06-06 RX ORDER — CLOPIDOGREL BISULFATE 75 MG/1
75 TABLET ORAL DAILY
Status: DISCONTINUED | OUTPATIENT
Start: 2022-06-07 | End: 2022-06-08 | Stop reason: HOSPADM

## 2022-06-06 RX ORDER — METHYLPREDNISOLONE SODIUM SUCCINATE 125 MG/2ML
60 INJECTION, POWDER, LYOPHILIZED, FOR SOLUTION INTRAMUSCULAR; INTRAVENOUS ONCE
Status: COMPLETED | OUTPATIENT
Start: 2022-06-06 | End: 2022-06-06

## 2022-06-06 RX ORDER — ASPIRIN 81 MG/1
81 TABLET, CHEWABLE ORAL DAILY
Status: DISCONTINUED | OUTPATIENT
Start: 2022-06-07 | End: 2022-06-06 | Stop reason: SDUPTHER

## 2022-06-06 RX ORDER — ASPIRIN 81 MG/1
81 TABLET ORAL DAILY
Status: DISCONTINUED | OUTPATIENT
Start: 2022-06-07 | End: 2022-06-08 | Stop reason: HOSPADM

## 2022-06-06 RX ORDER — ONDANSETRON 2 MG/ML
4 INJECTION INTRAMUSCULAR; INTRAVENOUS ONCE
Status: COMPLETED | OUTPATIENT
Start: 2022-06-06 | End: 2022-06-06

## 2022-06-06 RX ORDER — EZETIMIBE 10 MG/1
10 TABLET ORAL DAILY
Status: DISCONTINUED | OUTPATIENT
Start: 2022-06-07 | End: 2022-06-08 | Stop reason: HOSPADM

## 2022-06-06 RX ORDER — METHYLPREDNISOLONE SODIUM SUCCINATE 125 MG/2ML
60 INJECTION, POWDER, LYOPHILIZED, FOR SOLUTION INTRAMUSCULAR; INTRAVENOUS EVERY 6 HOURS
Status: CANCELLED | OUTPATIENT
Start: 2022-06-07

## 2022-06-06 RX ORDER — SODIUM CHLORIDE 0.9 % (FLUSH) 0.9 %
10 SYRINGE (ML) INJECTION EVERY 12 HOURS SCHEDULED
Status: DISCONTINUED | OUTPATIENT
Start: 2022-06-06 | End: 2022-06-08 | Stop reason: HOSPADM

## 2022-06-06 RX ORDER — EZETIMIBE 10 MG/1
10 TABLET ORAL DAILY
COMMUNITY

## 2022-06-06 RX ORDER — SODIUM CHLORIDE 9 MG/ML
INJECTION, SOLUTION INTRAVENOUS PRN
Status: DISCONTINUED | OUTPATIENT
Start: 2022-06-06 | End: 2022-06-08 | Stop reason: HOSPADM

## 2022-06-06 RX ORDER — LISINOPRIL 5 MG/1
5 TABLET ORAL
Status: DISCONTINUED | OUTPATIENT
Start: 2022-06-06 | End: 2022-06-08 | Stop reason: HOSPADM

## 2022-06-06 RX ORDER — MECLIZINE HYDROCHLORIDE 25 MG/1
25 TABLET ORAL ONCE
Status: COMPLETED | OUTPATIENT
Start: 2022-06-06 | End: 2022-06-06

## 2022-06-06 RX ORDER — NITROGLYCERIN 0.4 MG/1
0.4 TABLET SUBLINGUAL EVERY 5 MIN PRN
Status: DISCONTINUED | OUTPATIENT
Start: 2022-06-06 | End: 2022-06-08 | Stop reason: HOSPADM

## 2022-06-06 RX ADMIN — METHYLPREDNISOLONE SODIUM SUCCINATE 60 MG: 125 INJECTION, POWDER, FOR SOLUTION INTRAMUSCULAR; INTRAVENOUS at 19:54

## 2022-06-06 RX ADMIN — IOPAMIDOL 75 ML: 755 INJECTION, SOLUTION INTRAVENOUS at 13:04

## 2022-06-06 RX ADMIN — SODIUM CHLORIDE, PRESERVATIVE FREE 10 ML: 5 INJECTION INTRAVENOUS at 13:04

## 2022-06-06 RX ADMIN — MECLIZINE HYDROCHLORIDE 25 MG: 25 TABLET ORAL at 19:22

## 2022-06-06 RX ADMIN — MORPHINE SULFATE 4 MG: 4 INJECTION, SOLUTION INTRAMUSCULAR; INTRAVENOUS at 16:48

## 2022-06-06 RX ADMIN — SODIUM CHLORIDE 80 ML: 9 INJECTION, SOLUTION INTRAVENOUS at 13:04

## 2022-06-06 RX ADMIN — ONDANSETRON 4 MG: 2 INJECTION INTRAMUSCULAR; INTRAVENOUS at 16:45

## 2022-06-06 RX ADMIN — MORPHINE SULFATE 4 MG: 4 INJECTION, SOLUTION INTRAMUSCULAR; INTRAVENOUS at 13:40

## 2022-06-06 RX ADMIN — ONDANSETRON 4 MG: 2 INJECTION INTRAMUSCULAR; INTRAVENOUS at 12:36

## 2022-06-06 ASSESSMENT — PAIN SCALES - GENERAL
PAINLEVEL_OUTOF10: 9
PAINLEVEL_OUTOF10: 8

## 2022-06-06 ASSESSMENT — ENCOUNTER SYMPTOMS
NAUSEA: 0
VOMITING: 0
BACK PAIN: 0
SHORTNESS OF BREATH: 0
SORE THROAT: 0
COUGH: 0
BACK PAIN: 1
DIARRHEA: 0
VOMITING: 1
CONSTIPATION: 1
WHEEZING: 0
RHINORRHEA: 0
ABDOMINAL PAIN: 0
NAUSEA: 1

## 2022-06-06 ASSESSMENT — PAIN - FUNCTIONAL ASSESSMENT: PAIN_FUNCTIONAL_ASSESSMENT: NONE - DENIES PAIN

## 2022-06-06 NOTE — ED NOTES
Mri Notified of info the MRI tech requested. Will call back with MARIE.      Coleen Barcenas RN  06/06/22 0929

## 2022-06-06 NOTE — ED NOTES
Mode of arrival (squad #, walk in, police, etc) : walk in         Chief complaint(s): dizziness, right side weakness, nausea         Arrival Note (brief scenario, treatment PTA, etc). : patient states he woke up this morning around 0700 with nausea, dizziness, and right side weakness. Patient states he fell a few days ago, denies any LOC. Patient states he has a hx of MI with stent placement. Patient states he also has hx of stroke. Patient complains of dizziness and nausea. Patient is alert and oriented x4.         C= \"Have you ever felt that you should Cut down on your drinking? \"  No  A= \"Have people Annoyed you by criticizing your drinking? \"  No  G= \"Have you ever felt bad or Guilty about your drinking? \"  No  E= \"Have you ever had a drink as an Eye-opener first thing in the morning to steady your nerves or to help a hangover? \"  No      Deferred []      Reason for deferring: N/A    *If yes to two or more: probable alcohol abuse. Donna Anderson RN  06/06/22 4599

## 2022-06-06 NOTE — ED NOTES
Patient daughter Donna Crechelsea updated on POC. Phone # [phone].       Dylon Bacon RN  06/06/22 7262

## 2022-06-06 NOTE — ED NOTES
[employer] Pain Pump  Implant date: 09/19/2016  Serial #: [id number]  Model #: [id number]       Saw Sherman RN  06/06/22 1438

## 2022-06-06 NOTE — H&P
[address] [facility]     HISTORY AND PHYSICAL EXAMINATION            Date:   6/7/2022  Patientname:  Naa Marie  Date of admission:  6/6/2022 11:49 AM  MRN:   [id number]  Account:  [de-identified]  YOB: 1960  PCP:    No primary care provider on file. Room:   [address] [place] [address]  Code Status:    Full Code    CHIEF COMPLAINT     Chief Complaint   Patient presents with    Dizziness       HISTORY OF PRESENT ILLNESS  (Character, Onset, Location, Duration,  Exacerbating/RelievingFactors, Radiation,   Associated Symptoms, Severity )      The patient is a 60 y.o.  male, with a history of cerebral infarction- right-sided weakness, chronic pancreatitis, hepatitis C, pulmonary embolism, HTN, NSTEMI -s/p angioplasty with stent, and chronic back pain with implanted morphine pump, who presents with dizziness. According to patient, he woke this morning with plan to complete his work-up as usual, but upon opening his eyes, he noted that the entire room appeared tilted and his vision was \"rolling like the wheels on a slot machine. \"  Reports that \"rolling vision\" is present only when both eyes are open but stops when either one eye or the other is closed. Patient reports accidentally smacking the front of his head on the door-jam early yesterday morning. Denies LOC from incident but states that he spent most of the day resting on the couch d/t headache. Symptoms are associated with intermittent episodes of nausea and vomiting throughout the day. Patient reports that he had these same symptoms when he had his stroke in the past.   Denies fever, chills, chest pain, cough, abdominal pain, diarrhea, and urinary symptoms. There are no aggravating or alleviating factors. Symptoms are reported as constant and moderate.     Additionally, patient reports that one week ago, his left eye was swollen shut, which he believes to have been caused by poison oak, as he has several areas of itching lesions over his body. Also, in addition to hitting the front of his head 2 days ago, reports that EMS carried him out of his house on a canvas sling and smacked the back of his head on the concrete step on the way down the stairs. Denies loss of consciousness from this episode, also. PAST MEDICAL HISTORY   Patient  has a past medical history of CAD (coronary artery disease), Hepatitis C, Hyperlipidemia, Hypertension, L5 spinal cord injury ([facility] MI (myocardial infarction) ([facility] Pancreatitis, Pulmonary embolism ([facility] Stroke ([facility] and TIA (transient ischemic attack). PAST SURGICAL HISTORY    Patient  has a past surgical history that includes liver biopsy and back surgery. FAMILY HISTORY    Patient family history includes Cancer in his father, maternal grandfather, maternal grandmother, mother, paternal grandfather, paternal grandmother, and sister; Heart Disease in his father. SOCIAL HISTORY    Patient  reports that he quit smoking about 18 months ago. His smoking use included cigarettes. He has a 66.00 pack-year smoking history. He has never used smokeless tobacco. He reports that he does not drink alcohol and does not use drugs. HOME MEDICATIONS        Prior to Admission medications    Medication Sig Start Date End Date Taking?  Authorizing Provider   ezetimibe (ZETIA) 10 mg tablet Take 10 mg by mouth daily   Yes Historical Provider, MD   lisinopril (PRINIVIL;ZESTRIL) 5 MG tablet Take 5 mg by mouth Daily with lunch    Yes Historical Provider, MD   clopidogrel (PLAVIX) 75 MG tablet Take 75 mg by mouth daily   Yes Historical Provider, MD   aspirin 81 MG chewable tablet Take 1 tablet by mouth daily 11/7/20  Yes Larence Whitmore, DO   atorvastatin (LIPITOR) 80 MG tablet Take 1 tablet by mouth nightly 11/6/20  Yes Larence Whitmore, DO   metoprolol tartrate (LOPRESSOR) 25 MG tablet Take 0.5 tablets by mouth 2 times daily 11/6/20  Yes Deloresence Whitmore, DO   nitroGLYCERIN (NITROSTAT) 0.4 MG SL tablet up to max of 3 total doses. If no relief after 1 dose, call 911. 11/6/20   Lois Danamanda, DO       ALLERGIES      Ativan [lorazepam]    REVIEW OF SYSTEMS     Review of Systems   Constitutional: Negative for chills, diaphoresis and fever. HENT: Negative for congestion and sore throat. Eyes: Positive for visual disturbance (visual field tilted and rolling when both eyes are open). Respiratory: Negative for cough, shortness of breath and wheezing. Cardiovascular: Negative for chest pain, palpitations and leg swelling. Gastrointestinal: Positive for constipation, nausea and vomiting. Negative for abdominal pain and diarrhea. Genitourinary: Negative for dysuria, frequency and urgency. Musculoskeletal: Positive for back pain (chronic - implanted morphine pump). Negative for myalgias. Skin: Negative for rash. Neurological: Positive for dizziness and headaches. Negative for weakness. Psychiatric/Behavioral: The patient is not nervous/anxious. PHYSICAL EXAM      /71   Pulse 70   Temp 97.5 °F (36.4 °C) (Oral)   Resp 16   Ht 5' 7\" (1.702 m)   Wt 190 lb 14.7 oz (86.6 kg)   SpO2 92%   BMI 25.89 kg/m²  Body mass index is 25.89 kg/m². Physical Exam  Constitutional:       General: He is not in acute distress. Appearance: He is well-developed. He is not diaphoretic. HENT:      Head: Normocephalic and atraumatic. Eyes:      Conjunctiva/sclera: Conjunctivae normal.      Pupils: Pupils are equal, round, and reactive to light. Neck:      Trachea: No tracheal deviation. Cardiovascular:      Rate and Rhythm: Normal rate and regular rhythm. Heart sounds: Normal heart sounds. No murmur heard. No friction rub. No gallop. Pulmonary:      Effort: Pulmonary effort is normal. No respiratory distress. Breath sounds: Normal breath sounds. No wheezing or rales. Chest:      Chest wall: No tenderness.    Abdominal:      General: Bowel sounds are normal. There is no distension. Palpations: Abdomen is soft. Tenderness: There is no abdominal tenderness. There is no guarding. Musculoskeletal:         General: No tenderness. Normal range of motion. Cervical back: Normal range of motion and neck supple. Right lower leg: Edema (1+) present. Left lower leg: Edema (1+) present. Lymphadenopathy:      Cervical: No cervical adenopathy. Skin:     General: Skin is warm and dry. Coloration: Skin is not pale. Findings: No erythema or rash. Neurological:      Mental Status: He is alert and oriented to person, place, and time. Motor: No seizure activity. Coordination: Coordination normal.      Comments: Awake, alert, and oriented to all spheres. No facial droop; tongue midline. Speech clear; reports visual disturbance of tilted and rolling vision when both eyes open. Extremities strong and equal bilaterally against resistance. No drifting of the extremities with extension. Able to lift and hold lower extremities off the bed. Psychiatric:         Behavior: Behavior normal.         Thought Content: Thought content normal.       DIAGNOSTICS      EKG:   EKG 12 Lead [[id number]]    Collected: 06/06/22 1214    Updated: 06/06/22 1215     Ventricular Rate 66 BPM    Atrial Rate 66 BPM    P-R Interval 170 ms    QRS Duration 80 ms    Q-T Interval 428 ms    QTc Calculation (Bazett) 448 ms    P Axis 15 degrees    R Axis -18 degrees    T Axis 59 degrees   Narrative:     Normal sinus rhythm   Normal ECG   When compared with ECG of 03-NOV-2020 15:17,   QRS axis Shifted right     Labs:  CBC:   Recent Labs     06/06/22  1224 06/07/22  0538   WBC 5.4 4.3   HGB 13.8 13.6    237     BMP:    Recent Labs     06/06/22  1224 06/06/22  1227 06/07/22  0712 06/07/22  1009     --  137  --    K 4.0  --  5.6* 4.4     --  103  --    CO2 19*  --  24  --    BUN 13  --  17  --    CREATININE 0.96 1.02 0.92  --    GLUCOSE 123*  --  137*  --      S. Calcium:  Recent Labs     06/07/22  0712   CALCIUM 9.4     S. Ionized Calcium:No results for input(s): IONCA in the last 72 hours. S. Magnesium:No results for input(s): MG in the last 72 hours. S. Phosphorus:No results for input(s): PHOS in the last 72 hours. S. Glucose:  Recent Labs     06/06/22  1212   POCGLU 120*     Glycosylated hemoglobin A1C:   Lab Results   Component Value Date    LABA1C 5.6 08/28/2015     Hepatic:   Recent Labs     06/07/22  0712   AST 27   ALT 27   ALKPHOS 131*     CARDIAC ENZY:   Recent Labs     06/06/22  1224   CKTOTAL 98   TROPHS 13   MYOGLOBIN 59     INR:   Recent Labs     06/06/22  1224   INR 1.0     BNP: No results for input(s): PROBNP in the last 72 hours. ABGs: No results for input(s): PH, PCO2, PO2, HCO3, O2SAT in the last 72 hours. Lipids:   Recent Labs     06/07/22  0712   CHOL 120   TRIG 41   HDL 43     Pancreatic functions:No results for input(s): LIPASE, AMYLASE in the last 72 hours. Haven Singh: No results for input(s): LACTA in the last 72 hours. Thyroid functions:   Lab Results   Component Value Date    TSH 1.28 11/03/2020      U/A:No results for input(s): NITRITE, COLORU, WBCUA, RBCUA, MUCUS, BACTERIA, CLARITYU, SPECGRAV, LEUKOCYTESUR, BLOODU, GLUCOSEU, AMORPHOUS in the last 72 hours. Invalid input(s): Sheryle Myrtle  No results for input(s): COVID19 in the last 72 hours. Imaging/Diagonstics:     CT HEAD WO CONTRAST    Result Date: 6/6/2022  EXAMINATION: CT OF THE HEAD WITHOUT CONTRAST  6/6/2022 12:01 pm TECHNIQUE: CT of the head was performed without the administration of intravenous contrast. Automated exposure control, iterative reconstruction, and/or weight based adjustment of the mA/kV was utilized to reduce the radiation dose to as low as reasonably achievable.  COMPARISON: MRI 08/28/2015 HISTORY: ORDERING SYSTEM PROVIDED HISTORY: Right-sided weakness, on antiplatelet medication, fall 2 days ago TECHNOLOGIST PROVIDED HISTORY: Right-sided weakness, on antiplatelet medication, fall 2 days ago Decision Support Exception - unselect if not a suspected or confirmed emergency medical condition->Emergency Medical Condition (MA) Reason for Exam: dizziness, weakness, lkw 7am Additional signs and symptoms: previous stroke, fall 3 days ago FINDINGS: BRAIN/VENTRICLES: There is no acute intracranial hemorrhage, mass effect or midline shift. No abnormal extra-axial fluid collection. The gray-white differentiation is maintained without evidence of an acute infarct. There is no evidence of hydrocephalus. Right frontal lobe encephalomalacia is identified. ORBITS: The visualized portion of the orbits demonstrate no acute abnormality. SINUSES: The visualized paranasal sinuses and mastoid air cells demonstrate no acute abnormality. SOFT TISSUES/SKULL:  No acute abnormality of the visualized skull or soft tissues. No acute intracranial abnormality. Right frontal lobe encephalomalacia The findings were sent to the Radiology Results [address] at 12:17 pm on 6/6/2022 to be communicated to a licensed caregiver. RECOMMENDATIONS: Unavailable     CTA HEAD NECK W CONTRAST    Result Date: 6/6/2022  EXAMINATION: CTA OF THE HEAD AND NECK WITH CONTRAST 6/6/2022 12:56 pm: TECHNIQUE: CTA of the head and neck was performed with the administration of intravenous contrast. Multiplanar reformatted images are provided for review. MIP images are provided for review. Stenosis of the internal carotid arteries measured using NASCET criteria. Automated exposure control, iterative reconstruction, and/or weight based adjustment of the mA/kV was utilized to reduce the radiation dose to as low as reasonably achievable.  COMPARISON: Contrast CT head from earlier today HISTORY: ORDERING SYSTEM PROVIDED HISTORY: Right-sided weakness TECHNOLOGIST PROVIDED HISTORY: Right-sided weakness Decision Support Exception - unselect if not a suspected or confirmed emergency medical condition->Emergency Medical Condition (MA) Reason for Exam: Right-sided weakness stroke alert FINDINGS: CTA NECK: AORTIC ARCH/ARCH VESSELS: No dissection or arterial injury. No significant stenosis of the brachiocephalic or subclavian arteries. CAROTID ARTERIES: No dissection, arterial injury, or hemodynamically significant stenosis by NASCET criteria. VERTEBRAL ARTERIES: No dissection, arterial injury, or significant stenosis. SOFT TISSUES: There is mild emphysema. There is ground-glass attenuation at the lung apices, likely related to mild pulmonary vascular congestion versus pneumonitis. No cervical or superior mediastinal lymphadenopathy. The larynx and pharynx are unremarkable. No acute abnormality of the salivary and thyroid glands. BONES: No acute osseous abnormality. CTA HEAD: ANTERIOR CIRCULATION: No significant stenosis of the intracranial internal carotid, anterior cerebral, or middle cerebral arteries. No aneurysm. POSTERIOR CIRCULATION: No significant stenosis of the vertebral, basilar, or posterior cerebral arteries. No aneurysm. OTHER: No dural venous sinus thrombosis on this non-dedicated study. BRAIN: No mass effect or midline shift. No extra-axial fluid collection. There are areas of encephalomalacia in the bilateral frontal lobes. The gray-white differentiation is maintained. No acute abnormality or flow limiting stenosis of the major arteries of the head and neck.      MRI BRAIN WO CONTRAST    Result Date: 6/6/2022  EXAMINATION: MRI OF THE BRAIN WITHOUT CONTRAST  6/6/2022 4:14 pm TECHNIQUE: Multiplanar multisequence MRI of the brain was performed without the administration of intravenous contrast. COMPARISON: CT head from earlier today, MRI brain August 28, 2015 HISTORY: ORDERING SYSTEM PROVIDED HISTORY: concern for stroke not seen on CT, posterior stroke involvement TECHNOLOGIST PROVIDED HISTORY: concern for stroke not seen on CT, posterior stroke involvement What is the sedation requirement?->None Decision Support Exception - unselect if not a suspected or confirmed emergency medical condition->Emergency Medical Condition (MA) Reason for Exam: previous stroke, right eye pain, dizziness, nausea. FINDINGS: INTRACRANIAL STRUCTURES/VENTRICLES: There is encephalomalacia in the bilateral frontal lobes, likely related to old infarctions. There is no acute infarct. No mass effect or midline shift. No evidence of an acute intracranial hemorrhage. The ventricles and sulci are normal in size and configuration. There is incidental partial empty sella. The normal signal voids within the major intracranial vessels appear maintained. ORBITS: The visualized portion of the orbits demonstrate no acute abnormality. SINUSES: The visualized paranasal sinuses and mastoid air cells demonstrate no acute abnormality. BONES/SOFT TISSUES: The bone marrow signal intensity appears normal. The soft tissues demonstrate no acute abnormality. No acute intracranial abnormality. Encephalomalacia in the bilateral frontal lobes, likely related to old infarctions, stable. ASSESSMENT  and  PLAN     Principal Problem:    Dizziness  Active Problems:    Hx of completed stroke    Coronary artery disease involving native coronary artery of native heart without angina pectoris    Hx pulmonary embolism    Hypertension  Resolved Problems:    * No resolved hospital problems.  *    Plan:    Dizziness  -NIHSS score 2 in ED  -CT head -no acute intracranial abnormality  --Right frontal lobe encephalomalacia  -CTA head and neck -no acute abnormality or flow-limiting stenosis     of major arteries of the head and neck  -MRI brain w/o contrast -no acute intracranial abnormality  --Encephalomalacia in bilateral frontal lobes  --Likley r/t old infarctions; stable  -neuro checks q 4 hours  -PT & OT to eval and treat in am  -neurology consulted in ED  --recommends solumedrol for possible vestibular neuritis  -Check HgbA1c and lipid panel in am  -2D echocardiogram in am    Constipation  -Patient reports recent constipation  --Believes it is due to morphine being received via pain pump  --Daily stool softeners no longer effective  -will begin Mirilax daily    Patient reports needing assistance finding a provider. Order entered for  to see. Consultations:     IP CONSULT TO NEUROLOGY  IP CONSULT TO INTERNAL MEDICINE  IP CONSULT TO SOCIAL WORK      Barabra Webb MD   6/7/2022  1:51 PM    [address] Jese Stoddard [facility]  [place], [address] [place] [address]. Phone  and add on       I have discussed the care of Celeste Parra ,   including pertinent history and exam findings,      6/7/22   with the Jose Sy CNP  I have seen and examined the patient and the key elements of all parts of the encounter have been performed by me . I agree with the assessment, plan and orders as documented by the resident. Hospital Problems           Last Modified POA    * (Principal) Dizziness 6/7/2022 Yes    Hx of completed stroke 6/7/2022 Yes    Coronary artery disease involving native coronary artery of native heart without angina pectoris 6/7/2022 Yes    Hx pulmonary embolism 6/7/2022 Yes    Hypertension 6/7/2022 Yes            --  No acute intracranial abnormality.       Encephalomalacia in the bilateral frontal lobes, likely related to old   infarctions, stable. Medications: Allergies:     Allergies   Allergen Reactions    Ativan [Lorazepam] Other (See Comments)     agitation       Current Meds:   Scheduled Meds:    polyethylene glycol  17 g Oral Daily    sodium chloride flush  10 mL IntraVENous 2 times per day    enoxaparin  40 mg SubCUTAneous Daily    aspirin  81 mg Oral Daily    Or    aspirin  300 mg Rectal Daily    atorvastatin  80 mg Oral Nightly    clopidogrel  75 mg Oral Daily    ezetimibe  10 mg Oral Daily    metoprolol tartrate  12.5 mg Oral BID    lisinopril  5 mg Oral Lunch     Continuous Infusions:    sodium chloride       PRN Meds: perflutren lipid microspheres, sodium chloride flush, sodium chloride flush, sodium chloride, ondansetron **OR** ondansetron, magnesium hydroxide, nitroGLYCERIN      ---- ;     MD MARY ANNE Hillman [address], [address] [place] [address].    Phone [phone]   Fax: [phone]  Answering Service: [phone]

## 2022-06-06 NOTE — ED NOTES
Patient to CT at this time. Patient on monitor with this RN.       Wilmer Altamirano RN  06/06/22 7898

## 2022-06-06 NOTE — VIRTUAL HEALTH
1 [facility] Stroke and Vascular Neurology Consult for  Andrea Mclain Stroke Alert through [address] @ 12:03  6/6/2022 5:54 PM  Pt Name: Joanne Link  MRN: [id number]  YOB: 1960  Date of evaluation: 6/6/2022  Primary Care Physician: No primary care provider on file. Reason for Evaluation: Stroke evaluation with Phone Consult, Discussion and Review of imaging    Joanne Link is a 60 y.o. male who presents with history of pain pump, hep c, cad, pe and htn with n/v dizziness this am with fall two days ago without loc. Worsened last two days. Possible concussion vs bppv. Ct head no hemorrhage. cta no dissection or lvo. Mri brain obtained this evening no stroke. ? Right sided weakness    LKW: 2 days with fall and head injury without loc  NIH:  1    Allergies  is allergic to ativan [lorazepam]. Medications  Prior to Admission medications    Medication Sig Start Date End Date Taking? Authorizing Provider   lisinopril (PRINIVIL;ZESTRIL) 5 MG tablet Take 2.5 mg by mouth daily     Historical Provider, MD   clopidogrel (PLAVIX) 75 MG tablet Take 75 mg by mouth daily    Historical Provider, MD   Glucosamine Sulfate-MSM (MSM-GLUCOSAMINE EX) Apply topically    Historical Provider, MD   aspirin 81 MG chewable tablet Take 1 tablet by mouth daily 11/7/20   Luis Ames, DO   nitroGLYCERIN (NITROSTAT) 0.4 MG SL tablet up to max of 3 total doses.  If no relief after 1 dose, call 911. 11/6/20   Luis Ames, DO   atorvastatin (LIPITOR) 80 MG tablet Take 1 tablet by mouth nightly 11/6/20   Luis Ames, DO   metoprolol tartrate (LOPRESSOR) 25 MG tablet Take 0.5 tablets by mouth 2 times daily 11/6/20   Luis Ames, DO    Scheduled Meds:  Continuous Infusions:  PRN Meds:.sodium chloride flush  Past Medical History   has a past medical history of CAD (coronary artery disease), Hepatitis C, Hyperlipidemia, Hypertension, L5 spinal cord injury ([facility] [address] MI (myocardial infarction) ([facility] [address] Pancreatitis, Pulmonary embolism ([facility] [address] [facility] and TIA (transient ischemic attack). Social History  Social History     Socioeconomic History    Marital status:      Spouse name: Not on file    Number of children: Not on file    Years of education: Not on file    Highest education level: Not on file   Occupational History    Not on file   Tobacco Use    Smoking status: Former Smoker     Packs/day: 1.50     Years: 44.00     Pack years: 66.00     Types: Cigarettes     Quit date: 2020     Years since quittin.5    Smokeless tobacco: Never Used   Substance and Sexual Activity    Alcohol use: No     Alcohol/week: 0.0 standard drinks    Drug use: No    Sexual activity: Not on file   Other Topics Concern    Not on file   Social History Narrative    Not on file     Social Determinants of Health     Financial Resource Strain:     Difficulty of Paying Living Expenses: Not on file   Food Insecurity:     Worried About [address] in the Last Year: Not on file    Christopher of Food in the Last Year: Not on file   Transportation Needs:     Lack of Transportation (Medical): Not on file    Lack of Transportation (Non-Medical):  Not on file   Physical Activity:     Days of Exercise per Week: Not on file    Minutes of Exercise per Session: Not on file   Stress:     Feeling of Stress : Not on file   Social Connections:     Frequency of Communication with Friends and Family: Not on file    Frequency of Social Gatherings with Friends and Family: Not on file    Attends Faith Services: Not on file    Active Member of Clubs or Organizations: Not on file    Attends Club or Organization Meetings: Not on file    Marital Status: Not on file   Intimate Partner Violence:     Fear of Current or Ex-Partner: Not on file    Emotionally Abused: Not on file    Physically Abused: Not on file    Sexually Abused: Not on file   Housing Stability:     Unable to Pay for Housing in the Last Year: Not on file    Number of Places Lived in the Last Year: Not on file    Unstable Housing in the Last Year: Not on file     Family History      Problem Relation Age of Onset    Cancer Mother         non-hodgkins lymphoma    Heart Disease Father     Cancer Father         non-hodgkins lymphoma    Cancer Sister         non-hodgkins lymphoma    Cancer Maternal Grandmother         non-hodgkins lymphoma    Cancer Maternal Grandfather         non-hodgkins lymphoma    Cancer Paternal Grandmother         non-hodgkins lymphoma    Cancer Paternal Grandfather         non-hodgkins lymphoma       OBJECTIVE  /74   Pulse 78   Temp 97 °F (36.1 °C)   Resp 16   Ht 5' 7\" (1.702 m)   Wt 180 lb (81.6 kg)   SpO2 98%   BMI 28.19 kg/m²     NIH Stroke Scale  Interval: Baseline  Level of Consciousness (1a): Alert  LOC Questions (1b): Answers both correctly  LOC Commands (1c): Performs both tasks correctly  Best Gaze (2): Normal  Visual (3): No visual loss  Facial Palsy (4): Normal symmetrical movement  Motor Arm, Left (5a): No drift  Motor Arm, Right (5b): Drift, but does not hit bed  Motor Leg, Left (6a): No drift  Motor Leg, Right (6b): No drift  Limb Ataxia (7): Absent  Sensory (8): (!) Mild to Moderate  Dysarthria (10): Normal  Extinction and Inattention (11): No abnormality  Pre-Morbid mRS: 0    Imaging:  Images were personally reviewed with VIZ. AI and PACS used to review images including:  CT brain without contrast: no hemorrhage  CTA imaging: no lvo or dissection  Mri brain no acute stroke      Assessment    60 y.o. male who presents with history of pain pump, hep c, cad, pe and htn with n/v dizziness this am with fall two days ago without loc. Differential DDx:  1. Less likely stroke, bppv vs mild concussive symptoms      Recommendations:  1. NIH 1  2. Recommend Outpatient Neurology Consult for further assessment and evaluation   3.  May consider pt/ot or vestibular rehab if dizziness

## 2022-06-06 NOTE — ED PROVIDER NOTES
Narrative    Not on file     Social Determinants of Health     Financial Resource Strain:     Difficulty of Paying Living Expenses: Not on file   Food Insecurity:     Worried About Running Out of Food in the Last Year: Not on file    Christopher of Food in the Last Year: Not on file   Transportation Needs:     Lack of Transportation (Medical): Not on file    Lack of Transportation (Non-Medical): Not on file   Physical Activity:     Days of Exercise per Week: Not on file    Minutes of Exercise per Session: Not on file   Stress:     Feeling of Stress : Not on file   Social Connections:     Frequency of Communication with Friends and Family: Not on file    Frequency of Social Gatherings with Friends and Family: Not on file    Attends Congregational Services: Not on file    Active Member of Clubs or Organizations: Not on file    Attends Club or Organization Meetings: Not on file    Marital Status: Not on file   Intimate Partner Violence:     Fear of Current or Ex-Partner: Not on file    Emotionally Abused: Not on file    Physically Abused: Not on file    Sexually Abused: Not on file   Housing Stability:     Unable to Pay for Housing in the Last Year: Not on file    Number of [place] in the Last Year: Not on file    Unstable Housing in the Last Year: Not on file       Family History   Problem Relation Age of Onset    Cancer Mother         non-hodgkins lymphoma    Heart Disease Father     Cancer Father         non-hodgkins lymphoma    Cancer Sister         non-hodgkins lymphoma    Cancer Maternal Grandmother         non-hodgkins lymphoma    Cancer Maternal Grandfather         non-hodgkins lymphoma    Cancer Paternal Grandmother         non-hodgkins lymphoma    Cancer Paternal Grandfather         non-hodgkins lymphoma       Allergies:    Ativan [lorazepam]    Home Medications:  Prior to Admission medications    Medication Sig Start Date End Date Taking?  Authorizing Provider   ezetimibe (Tatiana Artis) 10 mg tablet Take 10 mg by mouth daily   Yes Historical Provider, MD   lisinopril (PRINIVIL;ZESTRIL) 5 MG tablet Take 5 mg by mouth Daily with lunch    Yes Historical Provider, MD   clopidogrel (PLAVIX) 75 MG tablet Take 75 mg by mouth daily   Yes Historical Provider, MD   aspirin 81 MG chewable tablet Take 1 tablet by mouth daily 11/7/20  Yes Tae Malloy, DO   atorvastatin (LIPITOR) 80 MG tablet Take 1 tablet by mouth nightly 11/6/20  Yes Tae Malloy DO   metoprolol tartrate (LOPRESSOR) 25 MG tablet Take 0.5 tablets by mouth 2 times daily 11/6/20  Yes Tae Malloy, DO   nitroGLYCERIN (NITROSTAT) 0.4 MG SL tablet up to max of 3 total doses. If no relief after 1 dose, call 911. 11/6/20   Tae Malloy, DO       REVIEW OF SYSTEMS    (2-9 systems for level 4, 10 or more for level 5)    Review of Systems   Constitutional: Negative for chills, fatigue and fever. HENT: Negative for congestion and rhinorrhea. Respiratory: Negative for cough and shortness of breath. Cardiovascular: Negative for chest pain. Gastrointestinal: Negative for abdominal pain, nausea and vomiting. Genitourinary: Negative for dysuria and flank pain. Musculoskeletal: Negative for back pain and neck pain. Neurological: Positive for dizziness, tremors, weakness and headaches. Negative for light-headedness. Psychiatric/Behavioral: Negative for confusion. The patient is not nervous/anxious. All other systems reviewed and are negative. PHYSICAL EXAM   (up to 7 for level 4, 8 or more for level 5)    INITIAL VITALS:   ED Triage Vitals   BP Temp Temp src Pulse Resp SpO2 Height Weight   -- -- -- -- -- -- -- --       Physical Exam  Vitals and nursing note reviewed. Constitutional:       General: He is not in acute distress. Appearance: Normal appearance. He is not ill-appearing. Eyes:      General: No visual field deficit. Cardiovascular:      Rate and Rhythm: Normal rate and regular rhythm.       Pulses: Normal pulses. Radial pulses are 2+ on the right side and 2+ on the left side. Posterior tibial pulses are 2+ on the right side and 2+ on the left side. Heart sounds: Normal heart sounds. No murmur heard. Pulmonary:      Effort: Pulmonary effort is normal. No respiratory distress. Breath sounds: Normal breath sounds. No decreased breath sounds. Abdominal:      Palpations: Abdomen is soft. Tenderness: There is no abdominal tenderness. Musculoskeletal:      Cervical back: Normal range of motion and neck supple. Right lower leg: No edema. Left lower leg: No edema. Skin:     General: Skin is warm and dry. Capillary Refill: Capillary refill takes less than 2 seconds. Neurological:      General: No focal deficit present. Mental Status: He is alert and oriented to person, place, and time. GCS: GCS eye subscore is 4. GCS verbal subscore is 5. GCS motor subscore is 6. Cranial Nerves: No cranial nerve deficit, dysarthria or facial asymmetry. Sensory: Sensory deficit present. Motor: Weakness, tremor and pronator drift present. No seizure activity.          DIFFERENTIAL  DIAGNOSIS   PLAN (LABS / IMAGING / EKG):  Orders Placed This Encounter   Procedures    CT HEAD WO CONTRAST    CTA HEAD NECK W CONTRAST    MRI BRAIN WO CONTRAST    STROKE PANEL    Inpatient consult to Neurology    Inpatient consult to Internal Medicine    POC Glucose Fingerstick    Creatinine W/GFR Point of Care    EKG 12 Lead    Place in Observation Service       MEDICATIONS ORDERED:  Orders Placed This Encounter   Medications    ondansetron (ZOFRAN) injection 4 mg    iopamidol (ISOVUE-370) 76 % injection 75 mL    0.9 % sodium chloride bolus    sodium chloride flush 0.9 % injection 10 mL    morphine sulfate (PF) injection 4 mg    morphine sulfate (PF) injection 4 mg    ondansetron (ZOFRAN) injection 4 mg    meclizine (ANTIVERT) tablet 25 mg    methylPREDNISolone sodium (SOLU-MEDROL) injection 60 mg         DIAGNOSTIC RESULTS / EMERGENCYDEPARTMENT COURSE / MDM   LABS:  Labs Reviewed   STROKE PANEL - Abnormal; Notable for the following components:       Result Value    Glucose 123 (*)     CO2 19 (*)     Hematocrit 40.5 (*)     Monocytes 9 (*)     All other components within normal limits   POC GLUCOSE FINGERSTICK - Abnormal; Notable for the following components:    POC Glucose 120 (*)     All other components within normal limits   CREATININE W/GFR POINT OF CARE       RADIOLOGY:  CT HEAD WO CONTRAST    Result Date: 6/6/2022  EXAMINATION: CT OF THE HEAD WITHOUT CONTRAST  6/6/2022 12:01 pm TECHNIQUE: CT of the head was performed without the administration of intravenous contrast. Automated exposure control, iterative reconstruction, and/or weight based adjustment of the mA/kV was utilized to reduce the radiation dose to as low as reasonably achievable. COMPARISON: MRI 08/28/2015 HISTORY: ORDERING SYSTEM PROVIDED HISTORY: Right-sided weakness, on antiplatelet medication, fall 2 days ago TECHNOLOGIST PROVIDED HISTORY: Right-sided weakness, on antiplatelet medication, fall 2 days ago Decision Support Exception - unselect if not a suspected or confirmed emergency medical condition->Emergency Medical Condition (MA) Reason for Exam: dizziness, weakness, lkw 7am Additional signs and symptoms: previous stroke, fall 3 days ago FINDINGS: BRAIN/VENTRICLES: There is no acute intracranial hemorrhage, mass effect or midline shift. No abnormal extra-axial fluid collection. The gray-white differentiation is maintained without evidence of an acute infarct. There is no evidence of hydrocephalus. Right frontal lobe encephalomalacia is identified. ORBITS: The visualized portion of the orbits demonstrate no acute abnormality. SINUSES: The visualized paranasal sinuses and mastoid air cells demonstrate no acute abnormality.  SOFT TISSUES/SKULL:  No acute abnormality of the visualized skull or soft tissues. No acute intracranial abnormality. Right frontal lobe encephalomalacia The findings were sent to the Radiology Results [address] at 12:17 pm on 6/6/2022 to be communicated to a licensed caregiver. RECOMMENDATIONS: Unavailable     CTA HEAD NECK W CONTRAST    Result Date: 6/6/2022  EXAMINATION: CTA OF THE HEAD AND NECK WITH CONTRAST 6/6/2022 12:56 pm: TECHNIQUE: CTA of the head and neck was performed with the administration of intravenous contrast. Multiplanar reformatted images are provided for review. MIP images are provided for review. Stenosis of the internal carotid arteries measured using NASCET criteria. Automated exposure control, iterative reconstruction, and/or weight based adjustment of the mA/kV was utilized to reduce the radiation dose to as low as reasonably achievable. COMPARISON: Contrast CT head from earlier today HISTORY: ORDERING SYSTEM PROVIDED HISTORY: Right-sided weakness TECHNOLOGIST PROVIDED HISTORY: Right-sided weakness Decision Support Exception - unselect if not a suspected or confirmed emergency medical condition->Emergency Medical Condition (MA) Reason for Exam: Right-sided weakness stroke alert FINDINGS: CTA NECK: AORTIC ARCH/ARCH VESSELS: No dissection or arterial injury. No significant stenosis of the brachiocephalic or subclavian arteries. CAROTID ARTERIES: No dissection, arterial injury, or hemodynamically significant stenosis by NASCET criteria. VERTEBRAL ARTERIES: No dissection, arterial injury, or significant stenosis. SOFT TISSUES: There is mild emphysema. There is ground-glass attenuation at the lung apices, likely related to mild pulmonary vascular congestion versus pneumonitis. No cervical or superior mediastinal lymphadenopathy. The larynx and pharynx are unremarkable. No acute abnormality of the salivary and thyroid glands. BONES: No acute osseous abnormality.  CTA HEAD: ANTERIOR CIRCULATION: No significant stenosis of the intracranial internal carotid, anterior cerebral, or middle cerebral arteries. No aneurysm. POSTERIOR CIRCULATION: No significant stenosis of the vertebral, basilar, or posterior cerebral arteries. No aneurysm. OTHER: No dural venous sinus thrombosis on this non-dedicated study. BRAIN: No mass effect or midline shift. No extra-axial fluid collection. There are areas of encephalomalacia in the bilateral frontal lobes. The gray-white differentiation is maintained. No acute abnormality or flow limiting stenosis of the major arteries of the head and neck. MRI BRAIN WO CONTRAST    Result Date: 6/6/2022  EXAMINATION: MRI OF THE BRAIN WITHOUT CONTRAST  6/6/2022 4:14 pm TECHNIQUE: Multiplanar multisequence MRI of the brain was performed without the administration of intravenous contrast. COMPARISON: CT head from earlier today, MRI brain August 28, 2015 HISTORY: ORDERING SYSTEM PROVIDED HISTORY: concern for stroke not seen on CT, posterior stroke involvement TECHNOLOGIST PROVIDED HISTORY: concern for stroke not seen on CT, posterior stroke involvement What is the sedation requirement?->None Decision Support Exception - unselect if not a suspected or confirmed emergency medical condition->Emergency Medical Condition (MA) Reason for Exam: previous stroke, right eye pain, dizziness, nausea. FINDINGS: INTRACRANIAL STRUCTURES/VENTRICLES: There is encephalomalacia in the bilateral frontal lobes, likely related to old infarctions. There is no acute infarct. No mass effect or midline shift. No evidence of an acute intracranial hemorrhage. The ventricles and sulci are normal in size and configuration. There is incidental partial empty sella. The normal signal voids within the major intracranial vessels appear maintained. ORBITS: The visualized portion of the orbits demonstrate no acute abnormality. SINUSES: The visualized paranasal sinuses and mastoid air cells demonstrate no acute abnormality.  BONES/SOFT TISSUES: The bone marrow signal intensity appears normal. The soft tissues demonstrate no acute abnormality. No acute intracranial abnormality. Encephalomalacia in the bilateral frontal lobes, likely related to old infarctions, stable. EKG    EKG Interpretation    Interpreted by me    Rhythm: normal sinus   Rate: normal, 66  Axis: normal  Ectopy: none  Conduction: normal  ST Segments: no acute change  T Waves: no acute change  Q Waves: none    Clinical Impression: Sinus rhythm rate of 66. No ST segment changes, no arrhythmia, no ectopy. Normal axis, good R wave progression. T wave flattening in aVL. All EKG's are interpreted by the Emergency Department Physician whoeither signs or Co-signs this chart in the absence of a cardiologist.    Impression:  Presents after fall 2 days ago. Acute onset worsening of nausea and vomiting this morning at 7 AM.  Patient states he is unable to stop vomiting since then. Patient has some right-sided muscular weakness in the right upper extremity but not on the right lower extremity. No facial palsy. He does have some decreased sensation to the right side of his face and the right side of his neck into his right shoulder. Is on aspirin and Plavix. Concern for intracranial bleed. Stroke alert called. INITIAL NIH STROKE SCALE    Time Performed:  8490 AM    Administer stroke scale items in the order listed. Record performance in each category after each subscale exam. Do not go back and change scores. Follow directions provided for each exam technique. Scores should reflect what the patient does, not what the clinician thinks the patient can do. The clinician should record answers while administering the exam and work quickly. Except where indicated, the patient should not be coached (i.e., repeated requests to patient to make a special effort). 1a.  Level of consciousness:  0 - alert; keenly responsive  1b.   Level of consciousness questions:  0 - answers both questions correctly  1c. Level of consciousness questions:  0 - performs both tasks correctly  2. Best Gaze:  0 - normal  3. Visual:  0 - no visual loss  4. Facial Palsy:  0 - normal symmetric movement  5a. Motor left arm:  0 - no drift, limb holds 90 (or 45) degrees for full 10 seconds  5b. Motor right arm:  1 - drift, limb holds 90 (or 45) degrees but drifts down before full 10 seconds: does not hit bed  6a. Motor left le - no drift; leg holds 30 degree position for full 5 seconds  6b. Motor right le - no drift; leg holds 30 degree position for full 5 seconds  7. Limb Ataxia:  0 - absent  8. Sensory:  1 - mild to moderate sensory loss; patient feels pinprick is less sharp or is dull on the affected side; there is a loss of superficial pain with pinprick but patient is aware of being touched   9. Best Language:  0 - no aphasia, normal  10. Dysarthria:  0 - normal  11. Extinction and Inattention:  0 - no abnormality    TOTAL:  2      EMERGENCY DEPARTMENT COURSE:  ED Course as of 22 Spoke with Dr. William Cooper- recommending stroke Cts but wants MRI wo contrast regardless of CT findings. MRI ordered [AP]   Suzzette Mom Dr. Storm Sandifer of neurology recommended Ativan and Solu-Medrol for possible vestibular neuritis however patient does have an allergy to Ativan and recommended only Solu-Medrol. [AP]      ED Course User Index  [AP] Jazz Frost,        CT head, CTA head and neck, and MRI were all eventually negative. Patient still having ongoing dizziness and vertigo. Spoke with neurology as above, recommended Solu-Medrol which was given in addition to meclizine. Due to the ongoing vertigo, patient admitted to internal medicine.         PROCEDURES:  none    CONSULTS:  IP CONSULT TO NEUROLOGY  IP CONSULT TO INTERNAL MEDICINE    CRITICAL CARE:  There was a high probability of clinically significant/life threatening deterioration in this patient's condition which required my urgent intervention. Total critical care time was 20 minutes. This excludes any time for separately reportable procedures. FINAL IMPRESSION     1. Dizziness          DISPOSITION / PLAN   DISPOSITION Admitted 06/06/2022 07:52:51 PM        PATIENT REFERRED TO:  No follow-up provider specified.     DISCHARGE MEDICATIONS:  New Prescriptions    No medications on file       Julio C Lopez DO  Emergency Medicine Attending    (Please note that portions of this note were completed with a voice recognition program.  Efforts were made to edit the dictations but occasionally words are mis-transcribed.)          Julio C Lopez DO  06/06/22 2041

## 2022-06-07 ENCOUNTER — APPOINTMENT (OUTPATIENT)
Dept: NON INVASIVE DIAGNOSTICS | Age: 62
End: 2022-06-07
Payer: MEDICARE

## 2022-06-07 PROBLEM — Z86.73 HX OF COMPLETED STROKE: Status: ACTIVE | Noted: 2022-06-07

## 2022-06-07 PROBLEM — I25.10 CORONARY ARTERY DISEASE INVOLVING NATIVE CORONARY ARTERY OF NATIVE HEART WITHOUT ANGINA PECTORIS: Status: ACTIVE | Noted: 2022-06-07

## 2022-06-07 LAB
ALBUMIN SERPL-MCNC: 4 G/DL (ref 3.5–5.2)
ALP BLD-CCNC: 131 U/L (ref 40–129)
ALT SERPL-CCNC: 27 U/L (ref 5–41)
ANION GAP SERPL CALCULATED.3IONS-SCNC: 10 MMOL/L (ref 9–17)
AST SERPL-CCNC: 27 U/L
BILIRUB SERPL-MCNC: 0.38 MG/DL (ref 0.3–1.2)
BILIRUBIN DIRECT: 0.1 MG/DL
BILIRUBIN, INDIRECT: 0.28 MG/DL (ref 0–1)
BUN BLDV-MCNC: 17 MG/DL (ref 8–23)
CALCIUM SERPL-MCNC: 9.4 MG/DL (ref 8.6–10.4)
CHLORIDE BLD-SCNC: 103 MMOL/L (ref 98–107)
CHOLESTEROL/HDL RATIO: 2.8
CHOLESTEROL: 120 MG/DL
CO2: 24 MMOL/L (ref 20–31)
CREAT SERPL-MCNC: 0.92 MG/DL (ref 0.7–1.2)
EKG ATRIAL RATE: 66 BPM
EKG P AXIS: 15 DEGREES
EKG P-R INTERVAL: 170 MS
EKG Q-T INTERVAL: 428 MS
EKG QRS DURATION: 80 MS
EKG QTC CALCULATION (BAZETT): 448 MS
EKG R AXIS: -18 DEGREES
EKG T AXIS: 59 DEGREES
EKG VENTRICULAR RATE: 66 BPM
ESTIMATED AVERAGE GLUCOSE: 123 MG/DL
GFR AFRICAN AMERICAN: >60 ML/MIN
GFR NON-AFRICAN AMERICAN: >60 ML/MIN
GFR SERPL CREATININE-BSD FRML MDRD: ABNORMAL ML/MIN/{1.73_M2}
GLUCOSE BLD-MCNC: 137 MG/DL (ref 70–99)
HBA1C MFR BLD: 5.9 % (ref 4–6)
HCT VFR BLD CALC: 40.3 % (ref 41–53)
HDLC SERPL-MCNC: 43 MG/DL
HEMOGLOBIN: 13.6 G/DL (ref 13.5–17.5)
LDL CHOLESTEROL: 69 MG/DL (ref 0–130)
LV EF: 55 %
LVEF MODALITY: NORMAL
MCH RBC QN AUTO: 29.3 PG (ref 26–34)
MCHC RBC AUTO-ENTMCNC: 33.8 G/DL (ref 31–37)
MCV RBC AUTO: 86.6 FL (ref 80–100)
PDW BLD-RTO: 13 % (ref 11.5–14.9)
PLATELET # BLD: 237 K/UL (ref 150–450)
PMV BLD AUTO: 7.4 FL (ref 6–12)
POTASSIUM SERPL-SCNC: 4.4 MMOL/L (ref 3.7–5.3)
POTASSIUM SERPL-SCNC: 5.6 MMOL/L (ref 3.7–5.3)
RBC # BLD: 4.65 M/UL (ref 4.5–5.9)
REASON FOR REJECTION: NORMAL
SODIUM BLD-SCNC: 137 MMOL/L (ref 135–144)
TOTAL PROTEIN: 6.7 G/DL (ref 6.4–8.3)
TRIGL SERPL-MCNC: 41 MG/DL
WBC # BLD: 4.3 K/UL (ref 3.5–11)
ZZ NTE CLEAN UP: ORDERED TEST: NORMAL
ZZ NTE WITH NAME CLEAN UP: SPECIMEN SOURCE: NORMAL

## 2022-06-07 PROCEDURE — 80076 HEPATIC FUNCTION PANEL: CPT

## 2022-06-07 PROCEDURE — 97162 PT EVAL MOD COMPLEX 30 MIN: CPT

## 2022-06-07 PROCEDURE — 80061 LIPID PANEL: CPT

## 2022-06-07 PROCEDURE — 96372 THER/PROPH/DIAG INJ SC/IM: CPT

## 2022-06-07 PROCEDURE — 6360000002 HC RX W HCPCS: Performed by: NURSE PRACTITIONER

## 2022-06-07 PROCEDURE — 93010 ELECTROCARDIOGRAM REPORT: CPT | Performed by: INTERNAL MEDICINE

## 2022-06-07 PROCEDURE — 99221 1ST HOSP IP/OBS SF/LOW 40: CPT | Performed by: PSYCHIATRY & NEUROLOGY

## 2022-06-07 PROCEDURE — 97110 THERAPEUTIC EXERCISES: CPT

## 2022-06-07 PROCEDURE — 85027 COMPLETE CBC AUTOMATED: CPT

## 2022-06-07 PROCEDURE — 80048 BASIC METABOLIC PNL TOTAL CA: CPT

## 2022-06-07 PROCEDURE — 2580000003 HC RX 258: Performed by: NURSE PRACTITIONER

## 2022-06-07 PROCEDURE — 97530 THERAPEUTIC ACTIVITIES: CPT

## 2022-06-07 PROCEDURE — G0378 HOSPITAL OBSERVATION PER HR: HCPCS

## 2022-06-07 PROCEDURE — 84132 ASSAY OF SERUM POTASSIUM: CPT

## 2022-06-07 PROCEDURE — 93306 TTE W/DOPPLER COMPLETE: CPT

## 2022-06-07 PROCEDURE — 6370000000 HC RX 637 (ALT 250 FOR IP): Performed by: NURSE PRACTITIONER

## 2022-06-07 PROCEDURE — 83036 HEMOGLOBIN GLYCOSYLATED A1C: CPT

## 2022-06-07 PROCEDURE — 97166 OT EVAL MOD COMPLEX 45 MIN: CPT

## 2022-06-07 PROCEDURE — 99220 PR INITIAL OBSERVATION CARE/DAY 70 MINUTES: CPT | Performed by: INTERNAL MEDICINE

## 2022-06-07 PROCEDURE — 36415 COLL VENOUS BLD VENIPUNCTURE: CPT

## 2022-06-07 RX ORDER — POLYETHYLENE GLYCOL 3350 17 G/17G
17 POWDER, FOR SOLUTION ORAL DAILY
Status: DISCONTINUED | OUTPATIENT
Start: 2022-06-07 | End: 2022-06-08 | Stop reason: HOSPADM

## 2022-06-07 RX ADMIN — SODIUM CHLORIDE, PRESERVATIVE FREE 10 ML: 5 INJECTION INTRAVENOUS at 08:46

## 2022-06-07 RX ADMIN — ENOXAPARIN SODIUM 40 MG: 100 INJECTION SUBCUTANEOUS at 08:45

## 2022-06-07 RX ADMIN — ATORVASTATIN CALCIUM 80 MG: 80 TABLET, FILM COATED ORAL at 21:41

## 2022-06-07 RX ADMIN — CLOPIDOGREL BISULFATE 75 MG: 75 TABLET ORAL at 08:44

## 2022-06-07 RX ADMIN — POLYETHYLENE GLYCOL 3350 17 G: 17 POWDER, FOR SOLUTION ORAL at 08:45

## 2022-06-07 RX ADMIN — LISINOPRIL 5 MG: 5 TABLET ORAL at 12:23

## 2022-06-07 RX ADMIN — MAGNESIUM HYDROXIDE 30 ML: 400 SUSPENSION ORAL at 21:41

## 2022-06-07 RX ADMIN — ASPIRIN 81 MG: 81 TABLET, COATED ORAL at 08:45

## 2022-06-07 RX ADMIN — EZETIMIBE 10 MG: 10 TABLET ORAL at 08:47

## 2022-06-07 RX ADMIN — METOPROLOL TARTRATE 12.5 MG: 25 TABLET, FILM COATED ORAL at 08:45

## 2022-06-07 ASSESSMENT — PAIN SCALES - GENERAL: PAINLEVEL_OUTOF10: 9

## 2022-06-07 ASSESSMENT — PAIN DESCRIPTION - LOCATION: LOCATION: BACK

## 2022-06-07 ASSESSMENT — PAIN DESCRIPTION - ORIENTATION: ORIENTATION: UPPER;MID;LOWER

## 2022-06-07 NOTE — PROGRESS NOTES
Occupational Therapy  Facility/Department: [address] [place] [address]  Occupational Therapy Initial Assessment    Name: Raji Ponce  : 1960  MRN: [id number]  Date of Service: 2022    Discharge Recommendations:  Patient would benefit from continued therapy after discharge  OT Equipment Recommendations  Other: TBD       Patient Diagnosis(es): The encounter diagnosis was Dizziness. Past Medical History:  has a past medical history of CAD (coronary artery disease), Hepatitis C, Hyperlipidemia, Hypertension, L5 spinal cord injury ([facility] [address] MI (myocardial infarction) ([facility] [address] Pancreatitis, Pulmonary embolism ([facility] [address] Stroke ([facility] [address] and TIA (transient ischemic attack). Past Surgical History:  has a past surgical history that includes liver biopsy and back surgery. Treatment Diagnosis: Impaired self-care status      Assessment   Performance deficits / Impairments: Decreased functional mobility ; Decreased ADL status; Decreased strength;Decreased endurance;Decreased balance;Decreased high-level IADLs  Treatment Diagnosis: Impaired self-care status  Prognosis: Good  Decision Making: Medium Complexity  REQUIRES OT FOLLOW-UP: Yes  Activity Tolerance  Activity Tolerance: Patient limited by fatigue        Plan   Plan  Times per Week: 4-5  Current Treatment Recommendations: Strengthening,Balance training,Functional mobility training,Endurance training,Safety education & training,Patient/Caregiver education & training,Equipment evaluation, education, & procurement,Self-Care / ADL     Restrictions  Restrictions/Precautions  Restrictions/Precautions: Fall Risk,General Precautions  Required Braces or Orthoses?: No  Position Activity Restriction  Other position/activity restrictions: up as tolerated    Subjective   General  Chart Reviewed: Yes  Patient assessed for rehabilitation services?: Yes  Additional Pertinent Hx: 60 y.o.   male, with a history of cerebral infarction- right-sided weakness, chronic pancreatitis, hepatitis C, pulmonary embolism, HTN, NSTEMI -s/p angioplasty with stent, and chronic back pain with implanted morphine pump, who presents with dizziness. According to patient, he woke this morning with plan to complete his work-up as usual, but upon opening his eyes, he noted that the entire room appeared tilted and his vision was \"rolling like the wheels on a slot machine. \"  Reports that \"rolling vision\" is present only when both eyes are open but stops when either one eye or the other is closed. Family / Caregiver Present: No  Referring Practitioner: MARIA TERESA Smith CNP  Diagnosis: Dizziness of unknown etiology  Subjective  Subjective: \"I can't walk yet. The room starts spinnin\"  General Comment  Comments: Okay for OT/PT evaluation per RN Allyson Zaidi     Social/Functional History  Social/Functional History  Lives With: Son (28 year old)  Type of Home: House  Home Layout: Two level,Laundry in basement,Able to Live on Main level with bedroom/bathroom (workout in basement)  Home Access: Stairs to enter with rails  Entrance Stairs - Number of Steps: 3 stepsx3  Entrance Stairs - Rails: Right  Bathroom Shower/Tub: Tub/Shower unit  Bathroom Toilet: Standard  Bathroom Equipment: Hand-held shower  Bathroom Accessibility: Not accessible  Home Equipment: Cane  Has the patient had two or more falls in the past year or any fall with injury in the past year?: No  ADL Assistance: Independent  Homemaking Assistance: Independent  Homemaking Responsibilities: Yes  Ambulation Assistance: Independent  Transfer Assistance: Independent  Active : Yes  Mode of Transportation: Car  Occupation: Retired,On disability  Additional Comments: Pt reported that he had a heart attack one year ago. Pt's son is available 24/7, but pt reports he is unreliable.         Objective   Heart Rate: 72  BP: 96/62  BP Location: Left upper arm  Patient Position: Lying right side  MAP (Calculated): 73.33  Resp: 16  SpO2: 90 %  O2 Device: None (Room air)  Comment: BP Supine: 121/94 HR 87 BP Sittin/87 HR: 98 BP Standin/83 HR: 126  Vision Exceptions: Wears glasses for reading  Hearing: Within functional limits       Observation/Palpation  Posture: Fair  Observation: peripheral IV L upper arm  Safety Devices  Type of Devices: All fall risk precautions in place;Call light within reach;Gait belt;Patient at risk for falls; Left in chair;Nurse notified        AROM: Within functional limits  Strength: Within functional limits  Coordination: Within functional limits  Tone: Normal  ADL  Feeding: Setup  Grooming: Setup  UE Bathing: Stand by assistance  LE Bathing: Minimal assistance  UE Dressing: Stand by assistance  LE Dressing: Minimal assistance  Toileting: Minimal assistance  Additional Comments: ADL scores based on skilled observation and clinical reasoning, unless otherwise noted. Assistance required due to significant dizziness with movement, impacting independence with self care  Tone RUE  RUE Tone: Normotonic  Tone LUE  LUE Tone: Normotonic  Coordination  Movements Are Fluid And Coordinated: Yes  Activity Tolerance  Activity Tolerance: Patient limited by fatigue;Patient limited by endurance  Activity Tolerance Comments: Increased SOB and HR with all activity. Bed mobility  Rolling to Right: Stand by assistance  Supine to Sit: Stand by assistance  Scooting: Stand by assistance  Bed Mobility Comments: HOB slightly elevated (10-degrees), no complaints of dizziness upon sitting EOB.    Transfers  Sit to stand: Contact guard assistance  Stand to sit: Contact guard assistance  Transfer Comments: Min cues for hand placement for safety     Cognition  Overall Cognitive Status: WFL        Sensation  Overall Sensation Status: Impaired (Neuropathy)         Education Given To: Patient  Education Provided: Plan of Care;Role of Therapy  Education Method: Verbal  Barriers to Learning: None  Education Outcome: Continued education needed  LUE AROM (degrees)  LUE AROM : WFL  Left Hand AROM (degrees)  Left Hand AROM: WFL  RUE AROM (degrees)  RUE AROM : WFL  Right Hand AROM (degrees)  Right Hand AROM: WFL  LUE Strength  Gross LUE Strength: WFL  L Hand General: 5/5  RUE Strength  Gross RUE Strength: WFL  R Hand General: 5/5                  G-Code     OutComes Score                                                  AM-PAC Score        AM-PAC Inpatient Daily Activity Raw Score: 18 (06/07/22 1053)  AM-PAC Inpatient ADL T-Scale Score : 38.66 (06/07/22 1053)  ADL Inpatient CMS 0-100% Score: 46.65 (06/07/22 1053)  ADL Inpatient CMS G-Code Modifier : CK (06/07/22 1053)    Goals  Short Term Goals  Time Frame for Short term goals: By discharge  Short Term Goal 1: Pt will perform BADLs mod I and Good safety  Short Term Goal 2: Pt will perform transfers/functional mobility mod I and Good safety  Short Term Goal 3: Pt will tolerate standing 5+ minutes, with least restrictive device, during self care tasks  Short Term Goal 4: Pt will V/D at least three EC/WS techniques to increase IND in daily routine  Short Term Goal 5: Pt will actively participate in 15+ minutes of therapeutic exercise/functional activity to promote safety and independence with self care and mobiltiy       Therapy Time   Individual Concurrent Group Co-treatment   Time In 0838         Time Out 0921         Minutes 43         Timed Code Treatment Minutes: Cem Fernandez OT

## 2022-06-07 NOTE — PROGRESS NOTES
Medication History completed    New medications:  None    Medications discontinued:  Glucosamine (Patient not taking)    Changes to dosing:  None    Stated allergies:  Lorazepam (agitation)    Other pertinent information:  Patient denies any alcohol, tobacco, cannabis, or street drugs. Could not verify medication list with [facility], since they were closed. Confirmed medication list from the fill history. Spike TeeD Candidate 2023    I personally observed and participated in the completion of this medication reconciliation.   Thank you,  Kwabena TeeD, [facility]  PGY-1 Pharmacy Resident

## 2022-06-07 NOTE — FLOWSHEET NOTE
06/07/22 1044   Encounter Summary   Encounter Overview/Reason   Encounter   Service Provided For: Patient   Referral/Consult From: Lele   Last Encounter  06/07/22   Complexity of Encounter Low   Begin Time 1000   End Time  1015   Total Time Calculated 15 min   Spiritual/Emotional needs   Type Spiritual Support   Rituals, Rites and Sacraments   Type Anointing  (Fr Owens 6/7/22)

## 2022-06-07 NOTE — PLAN OF CARE
Problem: Discharge Planning  Goal: Discharge to home or other facility with appropriate resources  Outcome: Progressing     Problem: Skin/Tissue Integrity  Goal: Absence of new skin breakdown  Description: 1. Monitor for areas of redness and/or skin breakdown  2. Assess vascular access sites hourly  3. Every 4-6 hours minimum:  Change oxygen saturation probe site  4. Every 4-6 hours:  If on nasal continuous positive airway pressure, respiratory therapy assess nares and determine need for appliance change or resting period.   Outcome: Progressing     Problem: Safety - Adult  Goal: Free from fall injury  Outcome: Progressing     Problem: Neurosensory - Adult  Goal: Achieves stable or improved neurological status  Outcome: Progressing  Goal: Absence of seizures  Outcome: Progressing  Goal: Remains free of injury related to seizures activity  Outcome: Progressing  Goal: Achieves maximal functionality and self care  Outcome: Progressing     Problem: Musculoskeletal - Adult  Goal: Return mobility to safest level of function  Outcome: Progressing  Goal: Maintain proper alignment of affected body part  Outcome: Progressing  Goal: Return ADL status to a safe level of function  Outcome: Progressing

## 2022-06-07 NOTE — CONSULTS
NEUROLOGY INPATIENT CONSULT NOTE    6/7/2022         Deana Castro is a  60 y.o. male admitted on 6/6/2022 with  Dizziness [R42]  Dizziness of unknown etiology [R42]      History is obtained mostly from the patient and the medical record and from the caregivers. Chart is reviewed and patient is examined. Briefly, this is a  60 y.o. right handed male with hx of HTN, Hep C, CAD, PE was admitted on 6/6/2022 with nausea, vomiting and dizziness since yesterday morning and recent fall without head injury and loss of consciousness. Patient was evaluated by stroke team on 6/6/2022. CT head, CTA head and neck and MRI brain were performed and those were unremarkable other than encephalomalacia in bilateral frontal lobes. Recommended to continue Plavix 75 mg daily and atorvastatin 80 mg nightly for secondary stroke prophylaxis. He stated that he had dizziness with lightheadedness with occasional feeling of about to have room spinning sensation for the past 1 week. But he had extremely severe vertigo with nausea and vomiting since yesterday morning lasting for several hours of the day. He also stated that he had gait difficulty with occasional right-sided weakness since after the stroke. He has been taking and atorvastatin for secondary stroke prophylaxis. Dizziness is described as vertiginous dizziness aggravated with any movement of the head. Admits to having had oral pressure. Symptoms lasted entire day. Denied hearing loss, tinnitus. Denied blurred vision and double vision. Denied speech and swallowing difficulties. His dizziness has much improved with meclizine and also with addition of prednisone. No current facility-administered medications on file prior to encounter.      Current Outpatient Medications on File Prior to Encounter   Medication Sig Dispense Refill    ezetimibe (ZETIA) 10 mg tablet Take 10 mg by mouth daily      lisinopril (PRINIVIL;ZESTRIL) 5 MG tablet Take 5 mg by mouth Daily with lunch       clopidogrel (PLAVIX) 75 MG tablet Take 75 mg by mouth daily      aspirin 81 MG chewable tablet Take 1 tablet by mouth daily 30 tablet 3    atorvastatin (LIPITOR) 80 MG tablet Take 1 tablet by mouth nightly 30 tablet 3    metoprolol tartrate (LOPRESSOR) 25 MG tablet Take 0.5 tablets by mouth 2 times daily 60 tablet 3    nitroGLYCERIN (NITROSTAT) 0.4 MG SL tablet up to max of 3 total doses. If no relief after 1 dose, call 911. 25 tablet 3     Allergies: Jay Cowan is allergic to ativan [lorazepam]. Past Medical History:   Diagnosis Date    CAD (coronary artery disease)     Hepatitis C     Hyperlipidemia     Hypertension     [facility]5 spinal cord [facility]     problem with right leg nerve    [facility]myocardial infarction) [facility]     Pancreatitis     Pulmonary embolism (HCC)     Stroke ([facility]     TIA (transient ischemic attack)        Past Surgical History:   Procedure Laterality Date    BACK SURGERY      LIVER BIOPSY       Social History: Jay Cowan  reports that he quit smoking about 18 months ago. His smoking use included cigarettes. He has a 66.00 pack-year smoking history. He has never used smokeless tobacco. He reports that he does not drink alcohol and does not use drugs.     Family History   Problem Relation Age of Onset    Cancer Mother         non-hodgkins lymphoma    Heart Disease Father     Cancer Father         non-hodgkins lymphoma    Cancer Sister         non-hodgkins lymphoma    Cancer Maternal Grandmother         non-hodgkins lymphoma    Cancer Maternal Grandfather         non-hodgkins lymphoma    Cancer Paternal Grandmother         non-hodgkins lymphoma    Cancer Paternal Grandfather         non-hodgkins lymphoma       Current Medications:     polyethylene glycol  17 g Oral Daily    sodium chloride flush  10 mL IntraVENous 2 times per day    enoxaparin  40 mg SubCUTAneous Daily    aspirin  81 mg Oral Daily    Or    aspirin  300 mg Rectal Daily    atorvastatin  80 mg Oral Nightly    clopidogrel  75 mg Oral Daily    ezetimibe  10 mg Oral Daily    metoprolol tartrate  12.5 mg Oral BID    lisinopril  5 mg Oral Lunch     PRN Meds include: perflutren lipid microspheres, sodium chloride flush, sodium chloride flush, sodium chloride, ondansetron **OR** ondansetron, magnesium hydroxide, nitroGLYCERIN    ROS:   Constitutional Negative for fever and chills   HEENT Negative for ear discharge, ear pain, nosebleed   Eyes Negative for photophobia, pain and discharge   Respiratory Negative for hemoptysis and sputum   Cardiovascular Negative for orthopnea, claudication and PND   Gastrointestinal Negative for abdominal pain, diarrhea, blood in stool   Musculoskeletal Negative for joint pain, negative for myalgia   Skin Negative for rash or itching   Endo/heme/allergies Negative for polydipsia, environmental allergy   Psychiatric Negative for suicidal ideation. Patient is not anxious           Objective:   /71   Pulse 70   Temp 97.5 °F (36.4 °C) (Oral)   Resp 16   Ht 5' 7\" (1.702 m)   Wt 190 lb 14.7 oz (86.6 kg)   SpO2 92%   BMI 25.89 kg/m²     Blood pressure range: Systolic (24ZLN), VNR:804 , Min:96 , FMJ:883   ; Diastolic (81TLN), WXQ:67, Min:62, Max:93      Continuous infusions:    sodium chloride         ON EXAMINATION:  GENERAL  Appears comfortable and in no distress   HEENT  NC/ AT   NECK  Supple and no bruits heard   Cardiovascular  S1, S2 heard; radial pulse intact   MENTAL STATUS:  Alert, oriented, intact memory, no confusion, normal speech, normal language, no hallucination or delusion   CRANIAL NERVES: II     -       Pupils reactive b/l., Fundus exam: intact venous pulsations;  Visual fields intact to confrontation  III,IV,VI -  EOMs full, no afferent defect, no ptosis  V     -     Normal facial sensation  VII    -     Normal facial symmetry  VIII   -     Intact hearing  IX,X -     Symmetrical palate  XI    -     Symmetrical shoulder shrug  XII   -     Midline tongue, no atrophy    MOTOR FUNCTION:  Normal bulk, normal tone, normal power;  no involuntary movements, no tremor   SENSORY FUNCTION:  Normal touch, normal pin, normal vibration, normal proprioception   CEREBELLAR FUNCTION:  Intact fine motor control over upper limbs   REFLEX FUNCTION:  Symmetric, no perverted reflex, no Babinski sign   STATION and GAIT  able to stand and walk with walker and no ataxia noted         Data:    Lab Results:     Lab Results   Component Value Date    CHOL 120 06/07/2022    LDLCHOLESTEROL 69 06/07/2022    HDL 43 06/07/2022    TRIG 41 06/07/2022    ALT 27 06/07/2022    AST 27 06/07/2022    TSH 1.28 11/03/2020    INR 1.0 06/06/2022    GLUF 108 (H) 01/03/2022    LABA1C 5.6 08/28/2015     Hematology:  Recent Labs     06/06/22  1224 06/07/22  0538   WBC 5.4 4.3   HGB 13.8 13.6   HCT 40.5* 40.3*    237   INR 1.0  --      Chemistry:  Recent Labs     06/06/22  1224 06/06/22  1227 06/07/22  0712 06/07/22  1009     --  137  --    K 4.0  --  5.6* 4.4     --  103  --    CO2 19*  --  24  --    GLUCOSE 123*  --  137*  --    BUN 13  --  17  --    CREATININE 0.96 1.02 0.92  --    CALCIUM 9.3  --  9.4  --      Recent Labs     06/06/22  1224 06/07/22  0712   PROT  --  6.7   LABALBU  --  4.0   AST  --  27   ALT  --  27   CHOL  --  120   HDL  --  43   LDLCHOLESTEROL  --  69   CHOLHDLRATIO  --  2.8   TRIG  --  41   CKTOTAL 98  --        No results found for: PHENYTOIN, PHENYTOIN, VALPROATE, CBMZ        Diagnostic data reviewed:  CT head 6/6/2022: No acute intracranial abnormality. Right frontal encephalomalacia noted. CTA head and neck 6/6/2022: No acute abnormality or flow-limiting stenosis of major arteries of the head and neck. MRI brain (6/6/2022): No acute intracranial abnormality. Encephalomalacia in bilateral frontal lobes related to old infarctions, stable on comparison to MRI 2015 study.           Impression and Plan: Mr. Mary Bedoya is a 60 y.o. male with   One week history of dizziness with lightheadedness associated with rash attributed to poison oak; rash is improving. One day hx of severe vertigo with nausea and vomiting suggestive of benign paroxysmal positional vertigo with a significant improvement with meclizine and one dose of steroid; to continue Meclizine and he would also benefit from outpatient vestibular rehab should his vertiginous symptoms continue. His presentation does not suggest stroke; CT head, CTA head and neck and MRI brain did not demonstrate any evidence of active ischemia and only chronic infarcts. History of frontal lobe stroke (2013) with residual sided weakness occurring predominantly towards the end of the day; presently neurologic exam did not demonstrate any evidence of localizing symptomatology. Advised to continue aspirin and atorvastatin for secondary stroke prophylaxis. Comorbid conditions include hypertension, hyperlipidemia, hepatitis C, pulmonary embolism and CAD. Care plan is discussed with patient and his nurse. Neurology service is signing off. Please call us again if there are any significant changes or questions. Thank you for consultation.            Shonna Moyer MD 6/7/2022 4:59 PM

## 2022-06-07 NOTE — ED NOTES
Report given to RASHAD Townsend from U. Report method by phone   The following was reviewed with receiving RN:   Current vital signs:  /69   Pulse 80   Temp 97 °F (36.1 °C)   Resp 12   Ht 5' 7\" (1.702 m)   Wt 180 lb (81.6 kg)   SpO2 97%   BMI 28.19 kg/m²                      Any medication or safety alerts were reviewed. Any pending diagnostics and notifications were also reviewed, as well as any safety concerns or issues, abnormal labs, abnormal imaging, and abnormal assessment findings. Questions were answered.             Khushbu Lundberg RN  06/06/22 0769

## 2022-06-07 NOTE — CARE COORDINATION
CASE MANAGEMENT NOTE:    Admission Date:  6/6/2022 Jacklyn Rubinstein is a 60 y.o.  male    Admitted for : Dizziness [R42]  Dizziness of unknown etiology [R42]    Met with:  Patient    PCP:  None, provided information                                Insurance:  Kendell Waller      Is patient alert and oriented at time of discussion:  Yes    Current Residence/ Living Arrangements:  independently at home             Current Services PTA:  No    Does patient go to outpatient dialysis: No  If yes, location and chair time:     Is patient agreeable to VNS: No    Freedom of choice provided:  Yes    List of [address] provided: No    VNS chosen:  No    DME:  straight cane    Home Oxygen: No    Nebulizer: No    CPAP/BIPAP: No    Supplier: N/A    Potential Assistance Needed: No    SNF needed: No    Freedom of choice and list provided: NA    Pharmacy:  [facility] in [place]       Is patient currently receiving oral anticoagulation therapy? No    Is the Patient an [facility] with Readmission Risk Score greater than 14%? No  If yes, pt needs a follow up appointment made within 7 days. Family Members/Caregivers that pt would like involved in their care:    Yes    If yes, list name here:  Cara Kee and Doug Plummer    Transportation Provider:  Patient             Discharge Plan:  06/07. MEDICARE/[employer]. Pt from home with son, independent and drives. DME: straight cane, wants shower chair needs DME order. Denies needs for VNS. PT/OT on board. Kai and hit head 2 days prior complaining on dizziness - Neuro consulted.  Will follow. //SS                 Electronically signed by: Osmel Correa RN on 6/7/2022 at 1:51 PM

## 2022-06-07 NOTE — PROGRESS NOTES
RN messaged Dr. Wilfrid Park is 5.6, up from 4.0. Awaiting response. Electronically signed by Segro Cartagena RN on 6/7/2022 at 8:05 AM      0840: second message out to Dr. Bridgett Buckley.     Jazmin Coyle: Dr. Bridgett Buckley acknowledged message, no new orders at this time.

## 2022-06-07 NOTE — PLAN OF CARE
Problem: Discharge Planning  Goal: Discharge to home or other facility with appropriate resources  Outcome: Progressing  Note: Pt is agreeable to go to SNF or ARU. Essentially medically cleared but will need discharge planning tomorrow. Problem: Skin/Tissue Integrity  Goal: Absence of new skin breakdown  Description: 1. Monitor for areas of redness and/or skin breakdown  2. Assess vascular access sites hourly  3. Every 4-6 hours minimum:  Change oxygen saturation probe site  4. Every 4-6 hours:  If on nasal continuous positive airway pressure, respiratory therapy assess nares and determine need for appliance change or resting period. Outcome: Progressing  Note: Skin assessment complete. Pt turned and repositioned per self. Area kept free from moisture. Proper nourishment and fluids encouraged, as appropriate. Skin remains clean, dry, and intact. Will continue to monitor for additional needs and changes in skin breakdown. Problem: Safety - Adult  Goal: Free from fall injury  Outcome: Progressing  Note: The patient remained free from falls this shift, call light within reach, bed in locked and lowest position. Side rails up x2. Continue to monitor closely.

## 2022-06-07 NOTE — PROGRESS NOTES
Physical Therapy  Facility/Department: [address] [place] [address]  Physical Therapy Initial Assessment    Name: Jess Cook  : 1960  MRN: [id number]  Date of Service: 2022    Discharge Recommendations:  Continue to assess pending progress,Patient would benefit from continued therapy after discharge,Therapy recommended at discharge          Patient Diagnosis(es): The encounter diagnosis was Dizziness. Past Medical History:  has a past medical history of CAD (coronary artery disease), Hepatitis C, Hyperlipidemia, Hypertension, L5 spinal cord injury ([address] MI (myocardial infarction) ([address] Pancreatitis, Pulmonary embolism ([facility] Stroke ([facility] and TIA (transient ischemic attack). Past Surgical History:  has a past surgical history that includes liver biopsy and back surgery. Assessment   Body Structures, Functions, Activity Limitations Requiring Skilled Therapeutic Intervention: Decreased functional mobility ; Decreased strength;Decreased balance;Decreased endurance  Assessment: Pt co-morbidities and PLOF allow for a good PT prognosis. Pt reports enojoying riding the recumbent bike he has in his basement. Pt is SBA for sup>sit, sit<>stand CGAx1, gait of 40ft CGAx1. Pt HR increases 87(supine) to 98 (EOB) to 126 (standing/walking). Treatment Diagnosis: decreased functional mobility, strength, balance, and endurance due to chest pain and weakness  Therapy Prognosis: Good  Decision Making: Medium Complexity  History: pt admitted to hospital due to stroke like symptoms  Exam: ROM, strength, functional mobility  Clinical Presentation: Pt is SBA for sup>sit, sit<>stand CGAx1, gait of 40ft CGAx1. Pt HR increases 87(supine) to 98 (EOB) to 126 (standing/walking). Barriers to Learning: none  Activity Tolerance  Activity Tolerance: Patient limited by fatigue;Patient limited by endurance  Activity Tolerance Comments: Increased SOB and HR with all activity.      Plan   Plan  Plan: 5-7 times per week  Specific Instructions for Next Treatment: pt should progress in the following: distance walked, steps , strength in bilateral LE, overalll activity tolerance  Current Treatment Recommendations: Strengthening,Balance training,Functional mobility training,Transfer training,Endurance training,Gait training,Stair training,Safety education & training,Therapeutic activities  Safety Devices  Type of Devices:  All fall risk precautions in place,Call light within reach,Gait belt,Patient at risk for falls,Left in chair,Nurse notified     Restrictions  Restrictions/Precautions  Restrictions/Precautions: Fall Risk,General Precautions  Required Braces or Orthoses?: No  Position Activity Restriction  Other position/activity restrictions: up as tolerated     Subjective   Pain: pain in the neck and back (chronic from accident) 8/10  General  Patient assessed for rehabilitation services?: Yes  Response To Previous Treatment: Not applicable  Family / Caregiver Present: No  Referring Practitioner: Misti LAZARO  Referral Date : 06/06/22  Diagnosis: dizziness of unkown etiology  Follows Commands: Within Functional Limits  Other (Comment): okayed to proceed with PT eval per nurse Freeda   Subjective  Subjective: sudden dizziness and called 911         Social/Functional History  Social/Functional History  Lives With: Son (28 year old)  Type of Home: House  Home Layout: Two level,Laundry in basement,Able to Live on Main level with bedroom/bathroom (workout in basement)  Home Access: Stairs to enter with rails  Entrance Stairs - Number of Steps: 3 stepsx3  Entrance Stairs - Rails: Right  Bathroom Shower/Tub: Tub/Shower unit  Bathroom Toilet: Standard  Bathroom Equipment: Hand-held shower  Bathroom Accessibility: Not accessible  Home Equipment: Cane  Has the patient had two or more falls in the past year or any fall with injury in the past year?: No  ADL Assistance: [address] [place] [address] Avenue: Independent  Homemaking Responsibilities: Yes  Ambulation Assistance: Independent  Transfer Assistance: Independent  Active : Yes  Mode of Transportation: Car  Occupation: Retired,On disability  Additional Comments: Pt reported that he had a heart attack one year ago. Pt's son is available , but pt reports he is unreliable.    Vision/Hearing  Vision  Vision: Impaired  Vision Exceptions: Wears glasses for reading  Hearing  Hearing: Within functional limits    Cognition   Orientation  Overall Orientation Status: Within Normal Limits  Orientation Level: Oriented X4  Cognition  Overall Cognitive Status: WFL     Objective     O2 Device: None (Room air)  Comment: BP Supine: 121/94 HR 87 BP Sittin/87 HR: 98 BP Standin/83 HR: 126     Observation/Palpation  Posture: Fair  Observation: peripheral IV L upper arm  Gross Assessment  Sensation: Impaired (chronic numbness in toes)     AROM RLE (degrees)  RLE AROM: WFL  AROM LLE (degrees)  LLE AROM : WFL  AROM RUE (degrees)  RUE General AROM: see OT assessment on UE  AROM LUE (degrees)  LUE General AROM: see OT assessment on UE  Strength RLE  Strength RLE: Exception  R Hip Flexion: 3-/5  R Knee Flexion: 3/5  R Knee Extension: 3-/5  R Ankle Dorsiflexion: 3/5  R Ankle Plantar flexion: 3+/5  Strength LLE  Strength LLE: Exception  L Hip Flexion: 4-/5  L Knee Flexion: 4-/5  L Knee Extension: 4-/5  L Ankle Dorsiflexion: 4-/5  L Ankle Plantar Flexion: 4-/5  Strength RUE  Comment: see OT assessment on UE  Strength LUE  Comment: see OT assessment on UE           Bed mobility  Bridging: Stand by assistance  Rolling to Right: Stand by assistance  Supine to Sit: Stand by assistance  Sit to Supine: Unable to assess (left pt in bedside chair)  Bed Mobility Comments: HOB flat  Transfers  Sit to Stand: Contact guard assistance (RW used)  Stand to sit: Contact guard assistance (RW used)  Ambulation  Surface: level tile  Device: Rolling Walker  Assistance: Contact guard assistance  Quality of Gait: Pt Loss balance when veering the corner to exit room. Increased SOB. Gait Deviations: Slow Astrid;Decreased step length  Distance: 20ftx2  Comments: Pt reports feeling sharp chest pain that begins in stomach and travels to heart. Stairs/Curb  Stairs?: No     Balance  Posture: Fair  Sitting - Static: Good  Sitting - Dynamic: Fair;+  Standing - Static: Fair;- (RW used)  Standing - Dynamic: Fair;- (RW used)  Comments: pt denies dizziness during mobility           OutComes Score                                                  AM-PAC Score  AM-PAC Inpatient Mobility Raw Score : 16 (06/07/22 0838)  AM-PAC Inpatient T-Scale Score : 40.78 (06/07/22 3086)  Mobility Inpatient CMS 0-100% Score: 54.16 (06/07/22 8976)  Mobility Inpatient CMS G-Code Modifier : CK (06/07/22 6000)          Goals  Short Term Goals  Time Frame for Short term goals: 5-7 days  Short term goal 1: Pt will be able to tolerate one 30min therapeutic exercise session with a decrease of chest pain to show improvement in endurance and activity tolerance. Short term goal 2: Pt will have improved one half MMT grade in R LE to show improvement in strength. Short term goal 3: Pt will have improved in dynamic/static balance with least restrictive device with SBA and graded to a good-. Short term goal 4: The pt will be able to ambulated 50-70ft with least restrictive device SBA and no LOB and minimal fatigue reported to show ability to increase distance walked and safety in ambulation. Short term goal 5: Pt will be able to complete a sit<>stand with SBA to show improvement in transfers. Additional Goals?: Yes  Short Term Goal 6: Pt will be able to navigate 3 8\" step 3 times with CGAx1 and least restrictive device to show ability to enter home safely.   Patient Goals   Patient goals : to return home       Education  Patient Education  Education Given To: Patient  Education Provided: Role of Therapy;Transfer Training;Energy Conservation;Equipment  Education Method: Demonstration;Verbal  Education Outcome: Continued education needed;Verbalized understanding      Therapy Time   Individual Concurrent Group Co-treatment   Time In 0812         Time Out 0921         Minutes 43         Timed Code Treatment Minutes: 23 Minutes       Josep De Dios    PT evaluation/treatment is completed by COLTEN Mercado under the direct supervision of co-signing therapist.

## 2022-06-08 VITALS
OXYGEN SATURATION: 97 % | HEIGHT: 67 IN | DIASTOLIC BLOOD PRESSURE: 82 MMHG | BODY MASS INDEX: 29.97 KG/M2 | SYSTOLIC BLOOD PRESSURE: 111 MMHG | TEMPERATURE: 97.7 F | HEART RATE: 94 BPM | RESPIRATION RATE: 18 BRPM | WEIGHT: 190.92 LBS

## 2022-06-08 LAB
ANION GAP SERPL CALCULATED.3IONS-SCNC: 12 MMOL/L (ref 9–17)
BUN BLDV-MCNC: 23 MG/DL (ref 8–23)
CALCIUM SERPL-MCNC: 9.2 MG/DL (ref 8.6–10.4)
CHLORIDE BLD-SCNC: 104 MMOL/L (ref 98–107)
CO2: 24 MMOL/L (ref 20–31)
CREAT SERPL-MCNC: 0.9 MG/DL (ref 0.7–1.2)
GFR AFRICAN AMERICAN: >60 ML/MIN
GFR NON-AFRICAN AMERICAN: >60 ML/MIN
GFR SERPL CREATININE-BSD FRML MDRD: ABNORMAL ML/MIN/{1.73_M2}
GLUCOSE BLD-MCNC: 108 MG/DL (ref 70–99)
HCT VFR BLD CALC: 42.3 % (ref 41–53)
HEMOGLOBIN: 13.8 G/DL (ref 13.5–17.5)
MCH RBC QN AUTO: 29.2 PG (ref 26–34)
MCHC RBC AUTO-ENTMCNC: 32.7 G/DL (ref 31–37)
MCV RBC AUTO: 89.3 FL (ref 80–100)
PDW BLD-RTO: 13.6 % (ref 11.5–14.9)
PLATELET # BLD: 255 K/UL (ref 150–450)
PMV BLD AUTO: 7.2 FL (ref 6–12)
POTASSIUM SERPL-SCNC: 4.6 MMOL/L (ref 3.7–5.3)
RBC # BLD: 4.73 M/UL (ref 4.5–5.9)
SODIUM BLD-SCNC: 140 MMOL/L (ref 135–144)
WBC # BLD: 8.3 K/UL (ref 3.5–11)

## 2022-06-08 PROCEDURE — G0378 HOSPITAL OBSERVATION PER HR: HCPCS

## 2022-06-08 PROCEDURE — 85027 COMPLETE CBC AUTOMATED: CPT

## 2022-06-08 PROCEDURE — 6360000002 HC RX W HCPCS: Performed by: NURSE PRACTITIONER

## 2022-06-08 PROCEDURE — 99222 1ST HOSP IP/OBS MODERATE 55: CPT | Performed by: STUDENT IN AN ORGANIZED HEALTH CARE EDUCATION/TRAINING PROGRAM

## 2022-06-08 PROCEDURE — 80048 BASIC METABOLIC PNL TOTAL CA: CPT

## 2022-06-08 PROCEDURE — 99217 PR OBSERVATION CARE DISCHARGE MANAGEMENT: CPT | Performed by: INTERNAL MEDICINE

## 2022-06-08 PROCEDURE — 6370000000 HC RX 637 (ALT 250 FOR IP): Performed by: NURSE PRACTITIONER

## 2022-06-08 PROCEDURE — 36415 COLL VENOUS BLD VENIPUNCTURE: CPT

## 2022-06-08 PROCEDURE — 96372 THER/PROPH/DIAG INJ SC/IM: CPT

## 2022-06-08 RX ADMIN — LISINOPRIL 5 MG: 5 TABLET ORAL at 11:58

## 2022-06-08 RX ADMIN — CLOPIDOGREL BISULFATE 75 MG: 75 TABLET ORAL at 08:34

## 2022-06-08 RX ADMIN — POLYETHYLENE GLYCOL 3350 17 G: 17 POWDER, FOR SOLUTION ORAL at 08:34

## 2022-06-08 RX ADMIN — ENOXAPARIN SODIUM 40 MG: 100 INJECTION SUBCUTANEOUS at 08:34

## 2022-06-08 RX ADMIN — ASPIRIN 81 MG: 81 TABLET, COATED ORAL at 08:34

## 2022-06-08 RX ADMIN — EZETIMIBE 10 MG: 10 TABLET ORAL at 08:34

## 2022-06-08 ASSESSMENT — ENCOUNTER SYMPTOMS
SHORTNESS OF BREATH: 0
DOUBLE VISION: 1
BACK PAIN: 1
ABDOMINAL PAIN: 1
CONSTIPATION: 1

## 2022-06-08 ASSESSMENT — PAIN SCALES - GENERAL: PAINLEVEL_OUTOF10: 0

## 2022-06-08 NOTE — PLAN OF CARE
Problem: Discharge Planning  Goal: Discharge to home or other facility with appropriate resources  Outcome: Completed  Flowsheets (Taken 6/8/2022 0736)  Discharge to home or other facility with appropriate resources:   Identify barriers to discharge with patient and caregiver   Arrange for needed discharge resources and transportation as appropriate   Identify discharge learning needs (meds, wound care, etc)   Refer to discharge planning if patient needs post-hospital services based on physician order or complex needs related to functional status, cognitive ability or social support system     Problem: Safety - Adult  Goal: Free from fall injury  Outcome: Completed  Flowsheets (Taken 6/8/2022 1122)  Free From Fall Injury:   Instruct family/caregiver on patient safety   Based on caregiver fall risk screen, instruct family/caregiver to ask for assistance with transferring infant if caregiver noted to have fall risk factors     Problem: Neurosensory - Adult  Goal: Achieves stable or improved neurological status  Outcome: Completed  Flowsheets (Taken 6/8/2022 0736)  Achieves stable or improved neurological status:   Assess for and report changes in neurological status   Initiate measures to prevent increased intracranial pressure   Maintain blood pressure and fluid volume within ordered parameters to optimize cerebral perfusion and minimize risk of hemorrhage   Monitor temperature, glucose, and sodium. Initiate appropriate interventions as ordered  Goal: Absence of seizures  Outcome: Completed  Flowsheets (Taken 6/8/2022 0736)  Absence of seizures:   Monitor for seizure activity.   If seizure occurs, document type and location of movements and any associated apnea   If seizure occurs, turn head to side and suction secretions as needed   Support airway/breathing, administer oxygen as needed   Diagnostic studies as ordered  Goal: Remains free of injury related to seizures activity  Outcome: Completed  Goal: Achieves maximal functionality and self care  Outcome: Completed  Flowsheets (Taken 6/8/2022 0736)  Achieves maximal functionality and self care:   Monitor swallowing and airway patency with patient fatigue and changes in neurological status   Encourage and assist patient to increase activity and self care with guidance from physical therapy/occupational therapy   Encourage visually impaired, hearing impaired and aphasic patients to use assistive/communication devices     Problem: Musculoskeletal - Adult  Goal: Return mobility to safest level of function  Outcome: Completed  Flowsheets (Taken 6/8/2022 0736)  Return Mobility to Safest Level of Function:   Assess patient stability and activity tolerance for standing, transferring and ambulating with or without assistive devices   Assist with transfers and ambulation using safe patient handling equipment as needed   Ensure adequate protection for wounds/incisions during mobilization   Obtain physical therapy/occupational therapy consults as needed   Instruct patient/family in ordered activity level  Goal: Maintain proper alignment of affected body part  Outcome: Completed  Flowsheets (Taken 6/8/2022 0736)  Maintain proper alignment of affected body part:   Support and protect limb and body alignment per provider's orders   Instruct and reinforce with patient and family use of appropriate assistive device and precautions (e.g. spinal or hip dislocation precautions)  Goal: Return ADL status to a safe level of function  Outcome: Completed  Flowsheets (Taken 6/8/2022 0736)  Return ADL Status to a Safe Level of Function:   Administer medication as ordered   Assess activities of daily living deficits and provide assistive devices as needed   Obtain physical therapy/occupational therapy consults as needed   Assist and instruct patient to increase activity and self care as tolerated     Problem: Pain  Goal: Verbalizes/displays adequate comfort level or baseline comfort level  Outcome: Completed

## 2022-06-08 NOTE — CONSULTS
Physical Medicine & Rehabilitation  Consult Note      Admitting Physician:  Manjinder Garces MD    Primary Care Provider:  No primary care provider on file. Reason for Consult:  Acute Inpatient Rehabilitation    Chief Complaint:  Vertigo    History of Present Illness:  Referring Provider is requesting an evaluation for appropriate placement upon discharge from acute care. History from chart review and patient. Cuca George is a 60 y.o. male with history of CVA, CAD, HTN, HLD, PE, hepatitis C, chronic pancreatitis, and chronic back pain s/p pain pump admitted to [facility] on 6/6/2022. He initially presented with dizziness/vertigo and history of hitting his head on a door-jam the previous day. NIHSS 2. MRI brain showed no acute intracranial abnormality. Neurology was consulted and is suspecting BPPV - recommended continuing meclizine, as it is helping patient's symptoms, and possibly outpatient vestibular rehab. He also had a rash that he thought was secondary to poison oak, which is improving with steroids. He states that vertigo and diplopia have resolved at this time. He does note fatigue. He also reports chronic back pain, abdominal pain, constipation, nocturia, and numbness/tingling in the hands and feet. Review of Systems:  Review of Systems   Constitutional: Positive for malaise/fatigue. Negative for fever. HENT: Negative for hearing loss. Eyes: Positive for double vision. Respiratory: Negative for shortness of breath. Cardiovascular: Positive for chest pain. Gastrointestinal: Positive for abdominal pain (chronic) and constipation (chronic). Genitourinary:        +nocturia with history of BPH   Musculoskeletal: Positive for back pain (chronic). Skin: Positive for rash. Neurological: Positive for dizziness and sensory change (chronic). Negative for headaches.       Premorbid function:  Independent    Current function:    PT:  Restrictions/Precautions: Fall Risk,General Precautions  Other position/activity restrictions: up as tolerated   Transfers  Sit to Stand: Contact guard assistance (RW used)  Stand to sit: Contact guard assistance (RW used)  Ambulation  Surface: level tile  Device: Rolling Walker  Assistance: Contact guard assistance  Quality of Gait: Pt Loss balance when veering the corner to exit room. Increased SOB. Gait Deviations: Slow Astrid,Decreased step length  Distance: 20ftx2  Comments: Pt reports feeling sharp chest pain that begins in stomach and travels to heart. Transfers  Sit to Stand: Contact guard assistance (RW used)  Stand to sit: Contact guard assistance (RW used)     Ambulation  Surface: level tile  Device: Rolling Walker  Assistance: Contact guard assistance  Quality of Gait: Pt Loss balance when veering the corner to exit room. Increased SOB. Gait Deviations: Slow Astrid,Decreased step length  Distance: 20ftx2  Comments: Pt reports feeling sharp chest pain that begins in stomach and travels to heart. Surface: level tile  Ambulation  Surface: level tile  Device: Rolling Walker  Assistance: Contact guard assistance  Quality of Gait: Pt Loss balance when veering the corner to exit room. Increased SOB. Gait Deviations: Slow Astrid,Decreased step length  Distance: 20ftx2  Comments: Pt reports feeling sharp chest pain that begins in stomach and travels to heart. OT:   ADL  Feeding: Setup  Grooming: Setup  UE Bathing: Stand by assistance  LE Bathing: Minimal assistance  UE Dressing: Stand by assistance  LE Dressing: Minimal assistance  Toileting: Minimal assistance  Additional Comments: ADL scores based on skilled observation and clinical reasoning, unless otherwise noted.  Assistance required due to significant dizziness with movement, impacting independence with self care         Balance  Sitting Balance: Contact guard assistance  Standing Balance: Contact guard assistance   Standing Balance  Time: 1 minute, 2-3 minutes  Activity: functional transfers, functional mobility  Comment: with RW for UE support  Functional Mobility  Functional - Mobility Device: Rolling Walker  Activity:  (To/from patient room door)  Assist Level: Contact guard assistance  Functional Mobility Comments: One LOB during small turn towards door, encouraged slow gait while turning around in hallway. Increased time due to low endurance. Bed mobility  Bridging: Stand by assistance  Rolling to Right: Stand by assistance  Supine to Sit: Stand by assistance  Sit to Supine: Unable to assess (left pt in bedside chair)  Scooting: Stand by assistance  Bed Mobility Comments: HOB slightly elevated (10-degrees), no complaints of dizziness upon sitting EOB. Transfers  Sit to stand: Contact guard assistance  Stand to sit: Contact guard assistance  Transfer Comments: Min cues for hand placement for safety                 SLP:      Past Medical History:        Diagnosis Date    CAD (coronary artery disease)     Hepatitis C     Hyperlipidemia     Hypertension     L5 spinal cord injury ([facility]     problem with right leg nerve    MI ([facility]ocardial infarction) ([facility]     Pancreatitis     Pulmonary embolism (HCC)     Stroke ([facility]     TIA (transient ischemic attack)        Past Surgical History:        Procedure Laterality Date    BACK SURGERY      LIVER BIOPSY         Allergies:     Allergies   Allergen Reactions    Ativan [Lorazepam] Other (See Comments)     agitation        Current Medications:   Current Facility-Administered Medications: polyethylene glycol (GLYCOLAX) packet 17 g, 17 g, Oral, Daily  perflutren lipid microspheres (DEFINITY) injection 1.65 mg, 1.5 mL, IntraVENous, ONCE PRN  sodium chloride flush 0.9 % injection 10 mL, 10 mL, IntraVENous, PRN  sodium chloride flush 0.9 % injection 10 mL, 10 mL, IntraVENous, 2 times per day  sodium chloride flush 0.9 % injection 10 mL, 10 mL, IntraVENous, PRN  0.9 % sodium chloride infusion, , IntraVENous, PRN  ondansetron (ZOFRAN-ODT) disintegrating tablet 4 mg, 4 mg, Oral, Q8H PRN **OR** ondansetron (ZOFRAN) injection 4 mg, 4 mg, IntraVENous, Q6H PRN  magnesium hydroxide (MILK OF MAGNESIA) 400 MG/5ML suspension 30 mL, 30 mL, Oral, Daily PRN  enoxaparin (LOVENOX) injection 40 mg, 40 mg, SubCUTAneous, Daily  aspirin EC tablet 81 mg, 81 mg, Oral, Daily **OR** aspirin suppository 300 mg, 300 mg, Rectal, Daily  atorvastatin (LIPITOR) tablet 80 mg, 80 mg, Oral, Nightly  clopidogrel (PLAVIX) tablet 75 mg, 75 mg, Oral, Daily  ezetimibe (ZETIA) tablet 10 mg, 10 mg, Oral, Daily  metoprolol tartrate (LOPRESSOR) tablet 12.5 mg, 12.5 mg, Oral, BID  nitroGLYCERIN (NITROSTAT) SL tablet 0.4 mg, 0.4 mg, SubLINGual, Q5 Min PRN  lisinopril (PRINIVIL;ZESTRIL) tablet 5 mg, 5 mg, Oral, Lunch    Family History:       Problem Relation Age of Onset    Cancer Mother         non-hodgkins lymphoma    Heart Disease Father     Cancer Father         non-hodgkins lymphoma    Cancer Sister         non-hodgkins lymphoma    Cancer Maternal Grandmother         non-hodgkins lymphoma    Cancer Maternal Grandfather         non-hodgkins lymphoma    Cancer Paternal Grandmother         non-hodgkins lymphoma    Cancer Paternal Grandfather         non-hodgkins lymphoma       Social History:  Lives With: Son (28year old)  Type of Home: House  Home Layout: Two level,Laundry in basement,Able to Live on Main level with bedroom/bathroom (workout in basement)  Home Access: Stairs to enter with rails  Entrance Stairs - Number of Steps: 3 stepsx3  Entrance Stairs - Rails: Right  Bathroom Shower/Tub: Tub/Shower unit  Bathroom Toilet: Standard  Bathroom Equipment: Hand-held shower  Bathroom Accessibility: Not accessible  Home Equipment: Cane  Has the patient had two or more falls in the past year or any fall with injury in the past year?: No  ADL Assistance: Independent  Homemaking Assistance: Independent  Homemaking Responsibilities: Yes  Ambulation Assistance: Independent  Transfer Assistance: Independent  Active : Yes  Mode of Transportation: Car  Occupation: Retired,On disability  Additional Comments: Pt reported that he had a heart attack one year ago. Pt's son is available , but pt reports he is unreliable. Social History     Socioeconomic History    Marital status:      Spouse name: None    Number of children: None    Years of education: None    Highest education level: None   Occupational History    None   Tobacco Use    Smoking status: Former Smoker     Packs/day: 1.50     Years: 44.00     Pack years: 66.00     Types: Cigarettes     Quit date: 2020     Years since quittin.5    Smokeless tobacco: Never Used   Substance and Sexual Activity    Alcohol use: No     Alcohol/week: 0.0 standard drinks    Drug use: No    Sexual activity: None   Other Topics Concern    None   Social History Narrative    None     Social Determinants of Health     Financial Resource Strain:     Difficulty of Paying Living Expenses: Not on file   Food Insecurity:     Worried About Running Out of Food in the Last Year: Not on file    Christopher of Food in the Last Year: Not on file   Transportation Needs:     Lack of Transportation (Medical): Not on file    Lack of Transportation (Non-Medical):  Not on file   Physical Activity:     Days of Exercise per Week: Not on file    Minutes of Exercise per Session: Not on file   Stress:     Feeling of Stress : Not on file   Social Connections:     Frequency of Communication with Friends and Family: Not on file    Frequency of Social Gatherings with Friends and Family: Not on file    Attends Evangelical Services: Not on file    Active Member of Clubs or Organizations: Not on file    Attends Club or Organization Meetings: Not on file    Marital Status: Not on file   Intimate Partner Violence:     Fear of Current or Ex-Partner: Not on file    Emotionally Abused: Not on file    Physically Abused: Not on file    Sexually Abused: Not on file   Housing Stability:     Unable to Pay for Housing in the Last Year: Not on file    Number of Places Lived in the Last Year: Not on file    Unstable Housing in the Last Year: Not on file       Physical Exam:  /82   Pulse 94   Temp 97.7 °F (36.5 °C) (Oral)   Resp 18   Ht 5' 7\" (1.702 m)   Wt 190 lb 14.7 oz (86.6 kg)   SpO2 97%   BMI 25.89 kg/m²     GEN: Well developed, well nourished, no acute distress  HEENT: NCAT. EOM grossly intact. Hearing grossly intact. Mucous membranes pink and moist.  RESP: Normal breath sounds with no wheezing, rales, or rhonchi. Respirations WNL and unlabored. CV: Regular rate and rhythm. No murmurs, rubs, or gallops. ABD: Soft, non-distended, BS+ and equal.  NEURO: Alert. Speech fluent. Sensation to light touch decreased in the bilateral feet. MSK:  Muscle tone and bulk are normal bilaterally. Strength 4+/5 in bilateral upper limbs. Strength 4+/5 with bilateral ankle dorsiflexion and plantarflexion. LIMBS: No edema in bilateral lower limbs. SKIN: Warm and dry with good turgor. PSYCH: Mood WNL. Affect WNL. Appropriately interactive. Diagnostics:    CBC:   Recent Labs     06/06/22  1224 06/07/22  0538 06/08/22  0622   WBC 5.4 4.3 8.3   RBC 4.66 4.65 4.73   HGB 13.8 13.6 13.8   HCT 40.5* 40.3* 42.3   MCV 87.0 86.6 89.3   RDW 13.1 13.0 13.6    237 255     BMP:   Recent Labs     06/06/22  1224 06/06/22  1224 06/06/22  1227 06/07/22  0712 06/07/22  1009 06/08/22  0622     --   --  137  --  140   K 4.0   < >  --  5.6* 4.4 4.6     --   --  103  --  104   CO2 19*  --   --  24  --  24   BUN 13  --   --  17  --  23   CREATININE 0.96  --  1.02 0.92  --  0.90   GLUCOSE 123*  --   --  137*  --  108*    < > = values in this interval not displayed. HbA1c:   Lab Results   Component Value Date    LABA1C 5.9 06/07/2022     BNP: No results for input(s): BNP in the last 72 hours.   PT/INR:   Recent Labs 06/06/22  1224   PROTIME 13.4   INR 1.0     APTT:   Recent Labs     06/06/22  1224   APTT 24.9     CARDIAC ENZYMES: No results for input(s): CKMB, CKMBINDEX, TROPONINT in the last 72 hours. Invalid input(s): CKTOTAL;3  FASTING LIPID PANEL:  Lab Results   Component Value Date    CHOL 120 06/07/2022    HDL 43 06/07/2022    TRIG 41 06/07/2022     LIVER PROFILE:   Recent Labs     06/07/22  0712   AST 27   ALT 27   BILIDIR 0.10   BILITOT 0.38   ALKPHOS 131*       Radiology:  MRI BRAIN WO CONTRAST   Final Result   No acute intracranial abnormality. Encephalomalacia in the bilateral frontal lobes, likely related to old   infarctions, stable. CTA HEAD NECK W CONTRAST   Final Result   No acute abnormality or flow limiting stenosis of the major arteries of the   head and neck. CT HEAD WO CONTRAST   Final Result   No acute intracranial abnormality. Right frontal lobe encephalomalacia      The findings were sent to the Radiology Results [address] at 12:17   pm on 6/6/2022 to be communicated to a licensed caregiver. RECOMMENDATIONS:   Unavailable               Impression:    1. Vertigo, suspected to be secondary to BPPV - resolved per patient  2. CAD with history of MI s/p stent  3. HTN  4. HLD  5. History of CVA  6. History of PE  7. Hepatitis C  8. Chronic back pain s/p pain pump    Recommendations:    1. Diagnosis:  Suspected BPPV  2. Therapy: Has PT/OT needs  3. Medical Necessity: As above  4. Support: Lives with son  5. Rehab Recommendation: The patient does not meet criteria for acute inpatient rehabilitation at this time. He states that vertigo has resolved but that he has fatigue. Would recommend lower level of rehab. 6. DVT Prophylaxis: Lovenox    It was my pleasure to evaluate Genny Nina today. Please call with questions.     Zuri Cota MD

## 2022-06-08 NOTE — CARE COORDINATION
went to discuss discharge plans with patient. Patient immediately became update upset. Patient reported that he has lost a marriage due to medical debt. Patient reported that he would be willing to go to facility and could even drive to outpatient if that was an option that did not cost him money. Patient mentioned that he had interest in going to [facility].  explained that there are certain criteria for admission, but she would talk with nursing staff about patient wanting to go.  explained to patient that she could provide some information for financial assistance for patients medial bills. Patient reported that he getting SSI disability and makes $24,000/ year. Patient reported that he cares for his 28year old son as well.  contacted Cem Jones from [facility] and explained patients situation to Cem Jones. Cem Jones reported that patient could flip his billing statement over and mail in to receive some type of assistance. Patient would have to submit one bank statement, and proof of income. Cem Jones reported that patient could get increments of his bill 8 months at a time.  will pass this information on to patient.  notified Efrain Villalobos RN, of patients interest in [facility]. Efrain Villalobos reported she will speak with Dr. Priya Ahumada about PM&R consult. Electronically signed by Beck Dominguez on 6/8/2022 at 9:04 AM        was informed that PM&R consult was put in.      Electronically signed by Beck Dominguez on 6/8/2022 at 1:32 PM

## 2022-06-08 NOTE — CARE COORDINATION
attempted to contact [facility], in [place] [id number] about PM&R consult that was consulted.  was unable to get a hold of [facility], so SW left voicemail with call back number.     Electronically signed by [facility] on 6/8/2022 at 4:31 PM

## 2022-06-08 NOTE — DISCHARGE INSTR - COC
Continuity of Care Form    Patient Name: Mary Phipps   :  1960  MRN:  [id number]    Admit date:  2022  Discharge date: 2022    Code Status Order: Full Code   Advance Directives:      Admitting Physician:  Sreedhar Melgoza MD  PCP: No primary care provider on file. Discharging Nurse: Kelly Cost.   [address] Unit/Room#: 2088/2088-01  Discharging Unit Phone Number: [phone]    Emergency Contact:   Extended Emergency Contact Information  Primary Emergency Contact: JeremyShruti rasheedemmamalikgaesmelevi Ann Phone: [phone]  Mobile Phone: [phone]  Relation: Child  Secondary Emergency Contact: Yoly Orozco  Address: [place]  Home Phone: [phone]  Relation: Child    Past Surgical History:  Past Surgical History:   Procedure Laterality Date    BACK SURGERY      LIVER BIOPSY         Immunization History:   Immunization History   Administered Date(s) Administered    Pneumococcal Polysaccharide ([id number]) 2015       Active Problems:  Patient Active Problem List   Diagnosis Code    Cerebral infarction [facility]) I63.9    Cerebral infarction due to embolism of left posterior cerebral artery ([facility] I63.432    Hyperglycemia R73.9    Hx pulmonary embolism Z86.711    Hx of hepatitis C Z86.19    Chronic pancreatitis ([facility] K86.1    Right sided weakness R53.1    NSTEMI (non-ST elevated myocardial infarction) ([facility] I21.4    Hypertension I10    L5 spinal cord injury ([facility] S34.105A    Pancreatitis K85.90    Hypercholesterolemia E78.00    S/P angioplasty with stent Z95.820    Dizziness R42    Hx of completed stroke Z86.73    Coronary artery disease involving native coronary artery of native heart without angina pectoris I25.10    Benign paroxysmal positional vertigo due to bilateral vestibular disorder H81.13       Isolation/Infection:   Isolation            No Isolation          Patient Infection Status       None to display            Nurse Assessment:  Last Vital Signs: /82   Pulse 94   Temp 97.7 °F (36.5 °C) (Oral)   Resp 18   Ht 5' 7\" (1.702 m)   Wt 190 lb 14.7 oz (86.6 kg)   SpO2 97%   BMI 25.89 kg/m²     Last documented pain score (0-10 scale): Pain Level: 0 (Sleeping)  Last Weight:   Wt Readings from Last 1 Encounters:   06/08/22 190 lb 14.7 oz (86.6 kg)     Mental Status:  oriented and alert    IV Access:  - None    Nursing Mobility/ADLs:  Walking   Independent  Transfer  Independent  Bathing  Independent  Dressing  Independent  [address]  Med Delivery   whole    Wound Care Documentation and Therapy:        Elimination:  Continence: Bowel: Yes  Bladder: Yes  Urinary Catheter: None   Colostomy/Ileostomy/Ileal Conduit: No       Date of Last BM: 3 weeks ago per patient    Intake/Output Summary (Last 24 hours) at 6/8/2022 1528  Last data filed at 6/8/2022 1159  Gross per 24 hour   Intake 420 ml   Output 1075 ml   Net -655 ml     I/O last 3 completed shifts: In: 920 [P.O.:920]  Out: 5189 [Urine:1175]    Safety Concerns:     History of Falls (last 30 days) and At Risk for Falls    Impairments/Disabilities:      None    Nutrition Therapy:  Current Nutrition Therapy:   - Oral Diet:  General    Routes of Feeding: Oral  Liquids: Thin Liquids  Daily Fluid Restriction: no  Last Modified Barium Swallow with Video (Video Swallowing Test): not done    Treatments at the Time of Hospital Discharge:   Respiratory Treatments: NA  Oxygen Therapy:  is not on home oxygen therapy.   Ventilator:    - No ventilator support    Rehab Therapies: Physical Therapy, Occupational Therapy, and vestibular therapy  Weight Bearing Status/Restrictions: No weight bearing restrictions  Other Medical Equipment (for information only, NOT a DME order):  {EQUIPMENT:319521380}  Other Treatments: ***    Patient's personal belongings (please select all that are sent with patient):  {CHP DME Belongings:778456813}    RN SIGNATURE:  {Esignature:622744329}    CASE MANAGEMENT/SOCIAL WORK SECTION    Inpatient Status Date: ***    Readmission Risk Assessment Score:  Readmission Risk              Risk of Unplanned Readmission:  0           Discharging to Facility/ Agency   Name:   Address:  Phone:  Fax:    Dialysis Facility (if applicable)   Name:  Address:  Dialysis Schedule:  Phone:  Fax:    / signature: {Esignature:007935915}    PHYSICIAN SECTION    Prognosis: {Prognosis:5491707043}    Condition at Discharge: [address] Patient Condition:345134614}    Rehab Potential (if transferring to Rehab): {Prognosis:1430591078}    Recommended Labs or Other Treatments After Discharge: ***    Physician Certification: I certify the above information and transfer of Tonja Blackwell  is necessary for the continuing treatment of the diagnosis listed and that he requires {Admit to Appropriate Level of Care:60739} for {GREATER/LESS:811576610} 30 days.      Update Admission H&P: {CHP DME Changes in ZFTGI:755520320}    PHYSICIAN SIGNATURE:  Electronically signed by Sinai Borja MD on 6/8/22 at 3:28 PM EDT

## 2022-06-08 NOTE — PROGRESS NOTES
Notified EB Reddy, via vm per Dr Dina Mccullough pt is not approp for ARU d/t neuro s/s resolved at this time. Also notified Maureen Mccullough is recommending lower level of rehab at this time.

## 2022-06-08 NOTE — PLAN OF CARE
Problem: Discharge Planning  Goal: Discharge to home or other facility with appropriate resources  6/8/2022 0052 by Jesus Mccarthy RN  Outcome: Progressing  6/7/2022 1839 by Sergo Cartagena RN  Outcome: Progressing  Note: Pt is agreeable to go to SNF or ARU. Essentially medically cleared but will need discharge planning tomorrow. Problem: Skin/Tissue Integrity  Goal: Absence of new skin breakdown  Description: 1. Monitor for areas of redness and/or skin breakdown  2. Assess vascular access sites hourly  3. Every 4-6 hours minimum:  Change oxygen saturation probe site  4. Every 4-6 hours:  If on nasal continuous positive airway pressure, respiratory therapy assess nares and determine need for appliance change or resting period. 6/8/2022 0052 by Jesus Mccarthy RN  Outcome: Progressing  6/7/2022 1839 by Sergo Cartagena RN  Outcome: Progressing  Note: Skin assessment complete. Pt turned and repositioned per self. Area kept free from moisture. Proper nourishment and fluids encouraged, as appropriate. Skin remains clean, dry, and intact. Will continue to monitor for additional needs and changes in skin breakdown. Problem: Safety - Adult  Goal: Free from fall injury  6/8/2022 0052 by Jesus Mccarthy RN  Outcome: Progressing  6/7/2022 1839 by Sergo Cartagena RN  Outcome: Progressing  Note: The patient remained free from falls this shift, call light within reach, bed in locked and lowest position. Side rails up x2. Continue to monitor closely.        Problem: Neurosensory - Adult  Goal: Achieves stable or improved neurological status  Outcome: Progressing  Goal: Absence of seizures  Outcome: Progressing  Goal: Remains free of injury related to seizures activity  Outcome: Progressing  Goal: Achieves maximal functionality and self care  Outcome: Progressing     Problem: Musculoskeletal - Adult  Goal: Return mobility to safest level of function  Outcome: Progressing  Goal: Maintain proper alignment of affected body part  Outcome: Progressing  Goal: Return ADL status to a safe level of function  Outcome: Progressing     Problem: Pain  Goal: Verbalizes/displays adequate comfort level or baseline comfort level  Outcome: Progressing     Problem: ABCDS Injury Assessment  Goal: Absence of physical injury  Outcome: Progressing

## 2022-06-08 NOTE — DISCHARGE SUMMARY
[facility] Internal Medicine    Discharge Summary     Patient ID: Karina Jerez  :  1960   MRN: [id number]     ACCOUNT:  [de-identified]   Patient's PCP: No primary care provider on file. Admit Date: 2022   Discharge Date: 2022    Length of Stay: 0  Code Status:  Full Code  Admitting Physician: Edilberto Peres MD  Discharge Physician: Edilberto Peres MD     Active Discharge Diagnoses:     Primary Problem  [place] Problems    Diagnosis Date Noted    Hx of completed stroke [Z86.73] 2022     Priority: Medium    Coronary artery disease involving native coronary artery of native heart without angina pectoris [I25.10] 2022     Priority: Medium    Benign paroxysmal positional vertigo due to bilateral vestibular disorder [H81.13]      Priority: Medium    Dizziness [R42] 2022     Priority: Medium    Hypertension [I10]     Hx pulmonary embolism [Z86.711] 2015       Admission Condition:  fair     Discharged Condition: fair    Hospital Stay:     Hospital Course:  Karina Jerez is a 60 y.o. male who was admitted for the management of Dizziness , presented to ER with Dizziness  admitted with dizziness . Dizziness resolved . Lisinopril stopped . Will discharge if not accepted by rehab     The patient is a 60 y.o.  male, with a history of cerebral infarction- right-sided weakness, chronic pancreatitis, hepatitis C, pulmonary embolism, HTN, NSTEMI -s/p angioplasty with stent, and chronic back pain with implanted morphine pump, who presents with dizziness. According to patient, he woke this morning with plan to complete his work-up as usual, but upon opening his eyes, he noted that the entire room appeared tilted and his vision was \"rolling like the wheels on a slot machine. \"  Reports that \"rolling vision\" is present only when both eyes are open but stops when either one eye or the other is closed. Patient reports accidentally smacking the front of his head on the door-jam early yesterday morning. Denies LOC from incident but states that he spent most of the day resting on the couch d/t headache. Symptoms are associated with intermittent episodes of nausea and vomiting throughout the day. Patient reports that he had these same symptoms when he had his stroke in the past.   Denies fever, chills, chest pain, cough, abdominal pain, diarrhea, and urinary symptoms. There are no aggravating or alleviating factors. Symptoms are reported as constant and moderate. neuro   Impression and Plan: Mr. Elizabeth Couch is a 60 y.o. male with   One week history of dizziness with lightheadedness associated with rash attributed to poison oak; rash is improving. One day hx of severe vertigo with nausea and vomiting suggestive of benign paroxysmal positional vertigo with a significant improvement with meclizine and one dose of steroid; to continue Meclizine and he would also benefit from outpatient vestibular rehab should his vertiginous symptoms continue. His presentation does not suggest stroke; CT head, CTA head and neck and MRI brain did not demonstrate any evidence of active ischemia and only chronic infarcts. History of frontal lobe stroke (2013) with residual sided weakness occurring predominantly towards the end of the day; presently neurologic exam did not demonstrate any evidence of localizing symptomatology. Advised to continue aspirin and atorvastatin for secondary stroke prophylaxis. Comorbid conditions include hypertension, hyperlipidemia, hepatitis C, pulmonary embolism and CAD. Care plan is discussed with patient and his nurse. Neurology service is signing off. Please call us again if there are any significant changes or questions. Thank you for consultation.                Sol Pierson MD 6/7/2022 4:59 PM            Significant therapeutic interventions: Significant Diagnostic Studies:   Labs / Micro:        ,     Radiology:    ECHO Complete 2D W Doppler W Color    Result Date: 6/7/2022  [facility] Transthoracic Echocardiography Report (TTE)  Patient Name [facility]  Date of Study                 06/07/2022               Serge Chao   Date of      1960  Gender                        Male  Birth   Age          60 year(s)  Race                             Room Number  2088        Height:                       66.93 inch, 170 cm   Corporate ID [id number]    Weight:                       179 pounds, 81 kg  #   Patient Acct [de-identified]   BSA:           1.93 m^2       BMI:      28.03  #                                                                kg/m^2   MR #         [id number]      Sonographer                   Shoshana Olivera   Accession #  [id number]  Interpreting Physician        Ntiin Clark   Fellow                   Referring Nurse Practitioner   Interpreting             Referring Physician           Barbra Meadows  Fellow  Type of Study   TTE procedure:2D Echocardiogram, M-Mode, Doppler, Color Doppler. Procedure Date Date: 06/07/2022 Start: 12:55 PM Study Location: [facility] Technical Quality: Fair visualization Indications:Dizziness and CVA. Patient Status: Inpatient Height: 66.93 inches Weight: 178.59 pounds BSA: 1.93 m^2 BMI: 28.03 kg/m^2 Rhythm: Within normal limits HR: 67 bpm BP: 96/62 mmHg CONCLUSIONS Summary Normal left ventricle size and function with an estimated EF >55%. No segmental wall motion abnormalities seen. Mild left ventricular hypertrophy. Normal right ventricular size and function. Left atrium is normal in size. Right atrium is normal in size. Unsuccessful bubble study attempt due to lost IV access. Normal aortic valve structure and function without stenosis or regurgitation. Normal aortic root dimension. Normal mitral valve structure and function. Trivial mitral regurgitation.  Normal tricuspid valve structure and function. Insignificant tricuspid regurgitation, unable to estimate RVSP. The pulmonic valve is normal in structure. Trivial pulmonic insufficiency. IVC not well visualized. No significant pericardial effusion is seen. Signature ----------------------------------------------------------------------------  Electronically signed by Nitin Clark(Interpreting physician) on  06/07/2022 03:50 PM ---------------------------------------------------------------------------- ----------------------------------------------------------------------------  Electronically signed by Shoshana Olivera(Sonographer) on 06/07/2022 02:32  PM ---------------------------------------------------------------------------- FINDINGS Left Atrium Left atrium is normal in size. Unsuccessful bubble study attempt due to lost IV access. Left Ventricle Normal left ventricle size and function with an estimated EF >55%. No segmental wall motion abnormalities seen. Mild left ventricular hypertrophy. Right Atrium Right atrium is normal in size. Right Ventricle Normal right ventricular size and function. Mitral Valve Normal mitral valve structure and function. Trivial mitral regurgitation. Aortic Valve Normal aortic valve structure and function without stenosis or regurgitation. Tricuspid Valve Normal tricuspid valve structure and function. Insignificant tricuspid regurgitation, unable to estimate RVSP. Pulmonic Valve The pulmonic valve is normal in structure. Trivial pulmonic insufficiency. Pericardial Effusion No significant pericardial effusion is seen. Miscellaneous Normal aortic root dimension. E/e' average 5.2 IVC not well visualized.  M-mode / 2D Measurements & Calculations:   LVIDd:4.2 cm(3.7 - 5.6 cm)       Diastolic KDIRRK:40.7 ml  DHUIW:8.70 cm(2.2 - 4.0 cm)      Systolic IGNGVV:48 ml  JDHV:1.93 cm(0.6 - 1.1 cm)       Aortic Root:3.4 cm(2.0 - 3.7 cm)  LVPWd:0.94 cm(0.6 - 1.1 cm)      LA Dimension: 3.1 cm(1.9 - 4.0 cm)  Fractional Shortenin %       LA volume/Index: 17.3 ml /9m^2  Calculated LVEF (%): 78.41 %     LVOT:2.4 cm   Mitral:                              Aortic   Peak E-Wave: 0.60 m/s                Peak Velocity: 1.49 m/s  Peak A-Wave: 0.92 m/s                Mean Velocity: 1.03 m/s  E/A Ratio: 0.66                      Peak Gradient: 8.88 mmHg  Peak Gradient: 1.44 mmHg             Mean Gradient: 5 mmHg  Deceleration Time: 299 msec                                        Area (continuity): 3.06 cm^2                                       AV VTI: 30.6 cm  Septal Wall E' velocity:0.11 m/s Lateral Wall E' velocity:0.12 m/s    CT HEAD WO CONTRAST    Result Date: 2022  EXAMINATION: CT OF THE HEAD WITHOUT CONTRAST  2022 12:01 pm TECHNIQUE: CT of the head was performed without the administration of intravenous contrast. Automated exposure control, iterative reconstruction, and/or weight based adjustment of the mA/kV was utilized to reduce the radiation dose to as low as reasonably achievable. COMPARISON: MRI 2015 HISTORY: ORDERING SYSTEM PROVIDED HISTORY: Right-sided weakness, on antiplatelet medication, fall 2 days ago TECHNOLOGIST PROVIDED HISTORY: Right-sided weakness, on antiplatelet medication, fall 2 days ago Decision Support Exception - unselect if not a suspected or confirmed emergency medical condition->Emergency Medical Condition (MA) Reason for Exam: dizziness, weakness, lkw 7am Additional signs and symptoms: previous stroke, fall 3 days ago FINDINGS: BRAIN/VENTRICLES: There is no acute intracranial hemorrhage, mass effect or midline shift. No abnormal extra-axial fluid collection. The gray-white differentiation is maintained without evidence of an acute infarct. There is no evidence of hydrocephalus. Right frontal lobe encephalomalacia is identified. ORBITS: The visualized portion of the orbits demonstrate no acute abnormality.  SINUSES: The visualized paranasal sinuses and mastoid air cells demonstrate no acute abnormality. SOFT TISSUES/SKULL:  No acute abnormality of the visualized skull or soft tissues. No acute intracranial abnormality. Right frontal lobe encephalomalacia The findings were sent to the Radiology Results [address] at 12:17 pm on 6/6/2022 to be communicated to a licensed caregiver. RECOMMENDATIONS: Unavailable     CTA HEAD NECK W CONTRAST    Result Date: 6/6/2022  EXAMINATION: CTA OF THE HEAD AND NECK WITH CONTRAST 6/6/2022 12:56 pm: TECHNIQUE: CTA of the head and neck was performed with the administration of intravenous contrast. Multiplanar reformatted images are provided for review. MIP images are provided for review. Stenosis of the internal carotid arteries measured using NASCET criteria. Automated exposure control, iterative reconstruction, and/or weight based adjustment of the mA/kV was utilized to reduce the radiation dose to as low as reasonably achievable. COMPARISON: Contrast CT head from earlier today HISTORY: ORDERING SYSTEM PROVIDED HISTORY: Right-sided weakness TECHNOLOGIST PROVIDED HISTORY: Right-sided weakness Decision Support Exception - unselect if not a suspected or confirmed emergency medical condition->Emergency Medical Condition (MA) Reason for Exam: Right-sided weakness stroke alert FINDINGS: CTA NECK: AORTIC ARCH/ARCH VESSELS: No dissection or arterial injury. No significant stenosis of the brachiocephalic or subclavian arteries. CAROTID ARTERIES: No dissection, arterial injury, or hemodynamically significant stenosis by NASCET criteria. VERTEBRAL ARTERIES: No dissection, arterial injury, or significant stenosis. SOFT TISSUES: There is mild emphysema. There is ground-glass attenuation at the lung apices, likely related to mild pulmonary vascular congestion versus pneumonitis. No cervical or superior mediastinal lymphadenopathy. The larynx and pharynx are unremarkable. No acute abnormality of the salivary and thyroid glands.  BONES: No acute osseous abnormality. CTA HEAD: ANTERIOR CIRCULATION: No significant stenosis of the intracranial internal carotid, anterior cerebral, or middle cerebral arteries. No aneurysm. POSTERIOR CIRCULATION: No significant stenosis of the vertebral, basilar, or posterior cerebral arteries. No aneurysm. OTHER: No dural venous sinus thrombosis on this non-dedicated study. BRAIN: No mass effect or midline shift. No extra-axial fluid collection. There are areas of encephalomalacia in the bilateral frontal lobes. The gray-white differentiation is maintained. No acute abnormality or flow limiting stenosis of the major arteries of the head and neck. MRI BRAIN WO CONTRAST    Result Date: 6/6/2022  EXAMINATION: MRI OF THE BRAIN WITHOUT CONTRAST  6/6/2022 4:14 pm TECHNIQUE: Multiplanar multisequence MRI of the brain was performed without the administration of intravenous contrast. COMPARISON: CT head from earlier today, MRI brain August 28, 2015 HISTORY: ORDERING SYSTEM PROVIDED HISTORY: concern for stroke not seen on CT, posterior stroke involvement TECHNOLOGIST PROVIDED HISTORY: concern for stroke not seen on CT, posterior stroke involvement What is the sedation requirement?->None Decision Support Exception - unselect if not a suspected or confirmed emergency medical condition->Emergency Medical Condition (MA) Reason for Exam: previous stroke, right eye pain, dizziness, nausea. FINDINGS: INTRACRANIAL STRUCTURES/VENTRICLES: There is encephalomalacia in the bilateral frontal lobes, likely related to old infarctions. There is no acute infarct. No mass effect or midline shift. No evidence of an acute intracranial hemorrhage. The ventricles and sulci are normal in size and configuration. There is incidental partial empty sella. The normal signal voids within the major intracranial vessels appear maintained. ORBITS: The visualized portion of the orbits demonstrate no acute abnormality.  SINUSES: The visualized paranasal sinuses and mastoid air cells demonstrate no acute abnormality. BONES/SOFT TISSUES: The bone marrow signal intensity appears normal. The soft tissues demonstrate no acute abnormality. No acute intracranial abnormality. Encephalomalacia in the bilateral frontal lobes, likely related to old infarctions, stable. Consultations:    Consults:     Final Specialist Recommendations/Findings:   IP CONSULT TO NEUROLOGY  IP CONSULT TO INTERNAL MEDICINE  IP CONSULT TO SOCIAL WORK  IP CONSULT TO PHYSICAL MEDICINE REHAB      The patient was seen and examined on day of discharge and this discharge summary is in conjunction with any daily progress note from day of discharge. Discharge plan:     Disposition: Home    Physician Follow Up:     No follow-up provider specified. Requiring Further Evaluation/Follow Up POST HOSPITALIZATION/Incidental Findings:    Diet: cardiac diet    Activity: As tolerated    Instructions to Patient:     Discharge Medications:      Medication List      CONTINUE taking these medications    aspirin 81 MG chewable tablet  Take 1 tablet by mouth daily     atorvastatin 80 MG tablet  Commonly known as: LIPITOR  Take 1 tablet by mouth nightly     clopidogrel 75 MG tablet  Commonly known as: PLAVIX     ezetimibe 10 mg tablet  Commonly known as: ZETIA     metoprolol tartrate 25 MG tablet  Commonly known as: LOPRESSOR  Take 0.5 tablets by mouth 2 times daily     nitroGLYCERIN 0.4 MG SL tablet  Commonly known as: NITROSTAT  up to max of 3 total doses. If no relief after 1 dose, call 911. STOP taking these medications    lisinopril 5 MG tablet  Commonly known as: PRINIVIL;ZESTRIL            Time Spent on discharge is  35 mins in patient examination, evaluation, counseling as well as medication reconciliation, prescriptions for required medications, discharge plan and follow up. Electronically signed by   Chino Saxena MD  6/8/2022  3:31 PM      Thank you Dr. Howard Counts primary care provider on file.  for the opportunity to be involved in this patient's care.

## 2022-06-08 NOTE — PROGRESS NOTES
[place] [address] Internal Medicine    Progress Note     6/8/2022    3:26 PM    Name:   Genny Nina  MRN:     [id number]     Acct:      [de-identified]   Room:   2088/2088-01  IP Day:  0  Admit Date:  6/6/2022 11:49 AM    PCP:   No primary care provider on file. Code Status:  Full Code    Subjective:     C/C:   Chief Complaint   Patient presents with    Dizziness     Principal Problem:    Dizziness  Active Problems:    Hx of completed stroke    Coronary artery disease involving native coronary artery of native heart without angina pectoris    Benign paroxysmal positional vertigo due to bilateral vestibular disorder    Hx pulmonary embolism    Hypertension  Resolved Problems:    * No resolved hospital problems. *    Patient dizziness resolved   Rehab consult awaited   Discharge if not accepted by rehab     Neuro input        Impression and Plan: Mr. Genny Nina is a 60 y.o. male with   One week history of dizziness with lightheadedness associated with rash attributed to poison oak; rash is improving. One day hx of severe vertigo with nausea and vomiting suggestive of benign paroxysmal positional vertigo with a significant improvement with meclizine and one dose of steroid; to continue Meclizine and he would also benefit from outpatient vestibular rehab should his vertiginous symptoms continue. His presentation does not suggest stroke; CT head, CTA head and neck and MRI brain did not demonstrate any evidence of active ischemia and only chronic infarcts. History of frontal lobe stroke (2013) with residual sided weakness occurring predominantly towards the end of the day; presently neurologic exam did not demonstrate any evidence of localizing symptomatology. Advised to continue aspirin and atorvastatin for secondary stroke prophylaxis. Comorbid conditions include hypertension, hyperlipidemia, hepatitis C, pulmonary embolism and CAD.   Care plan is discussed with patient and his nurse. Neurology service is signing off. Please call us again if there are any significant changes or questions. Thank you for consultation. Fab Robles MD 6/7/2022 4:59 PM            On admission     The patient is a 60 y.o.  male, with a history of cerebral infarction- right-sided weakness, chronic pancreatitis, hepatitis C, pulmonary embolism, HTN, NSTEMI -s/p angioplasty with stent, and chronic back pain with implanted morphine pump, who presents with dizziness. According to patient, he woke this morning with plan to complete his work-up as usual, but upon opening his eyes, he noted that the entire room appeared tilted and his vision was \"rolling like the wheels on a slot machine. \"  Reports that \"rolling vision\" is present only when both eyes are open but stops when either one eye or the other is closed. Patient reports accidentally smacking the front of his head on the door-jam early yesterday morning. Denies LOC from incident but states that he spent most of the day resting on the couch d/t headache. Symptoms are associated with intermittent episodes of nausea and vomiting throughout the day. Patient reports that he had these same symptoms when he had his stroke in the past.   Denies fever, chills, chest pain, cough, abdominal pain, diarrhea, and urinary symptoms. There are no aggravating or alleviating factors. Symptoms are reported as constant and moderate.           Significant last 24 hr data reviewed ;   Vitals:    06/07/22 1853 06/08/22 0000 06/08/22 0645 06/08/22 1157   BP: 99/66 97/64 109/84 111/82   Pulse: 68 69 73 94   Resp: 16 16 16 18   Temp: 97.9 °F (36.6 °C) 97.9 °F (36.6 °C) 97.5 °F (36.4 °C) 97.7 °F (36.5 °C)   TempSrc: Oral Oral Oral Oral   SpO2: 94% 97% 94% 97%   Weight:  190 lb 14.7 oz (86.6 kg)     Height:          Recent Results (from the past 24 hour(s))   CBC    Collection Time: 06/08/22  6:22 AM   Result Value Ref Range    WBC 8.3 3.5 - 11.0 k/uL    RBC 4.73 4.5 - 5.9 m/uL    Hemoglobin 13.8 13.5 - 17.5 g/dL    Hematocrit 42.3 41 - 53 %    MCV 89.3 80 - 100 fL    MCH 29.2 26 - 34 pg    MCHC 32.7 31 - 37 g/dL    RDW 13.6 11.5 - 14.9 %    Platelets 096 054 - 718 k/uL    MPV 7.2 6.0 - 12.0 fL   Basic Metabolic Panel w/ Reflex to MG    Collection Time: 06/08/22  6:22 AM   Result Value Ref Range    Glucose 108 (H) 70 - 99 mg/dL    BUN 23 8 - 23 mg/dL    CREATININE 0.90 0.70 - 1.20 mg/dL    Calcium 9.2 8.6 - 10.4 mg/dL    Sodium 140 135 - 144 mmol/L    Potassium 4.6 3.7 - 5.3 mmol/L    Chloride 104 98 - 107 mmol/L    CO2 24 20 - 31 mmol/L    Anion Gap 12 9 - 17 mmol/L    GFR Non-African American >60 >60 mL/min    GFR African American >60 >60 mL/min    GFR Comment           Recent Labs     06/06/22  1212   POCGLU 120*        ECHO Complete 2D W Doppler W Color    Result Date: 6/7/2022  1604 [facility] Transthoracic Echocardiography Report (TTE)  Patient Name [facility]  Date of Study                 06/07/2022               [facility]   Date of      1960  Gender                        Male  Birth   Age          60 year(s)  Race                             Room Number  2088        Height:                       66.93 inch, 170 cm   Corporate ID [id number]    Weight:                       179 pounds, 81 kg  #   Patient Acct [de-identified]   BSA:           1.93 m^2       BMI:      28.03  #                                                                kg/m^2   MR #         [id number]      Sonographer                   Shoshana Olivera   Accession #  [id number]  Interpreting Physician        Danny Motley   Fellow                   Referring Nurse Practitioner   Interpreting             Referring Physician           Nathaniel Rivera  Fellow  Type of Study   TTE procedure:2D Echocardiogram, M-Mode, Doppler, Color Doppler.   Procedure Date Date: 06/07/2022 Start: 12:55 PM Study Location: [facility] Technical Quality: Bard Curry visualization Indications:Dizziness and CVA. Patient Status: Inpatient Height: 66.93 inches Weight: 178.59 pounds BSA: 1.93 m^2 BMI: 28.03 kg/m^2 Rhythm: Within normal limits HR: 67 bpm BP: 96/62 mmHg CONCLUSIONS Summary Normal left ventricle size and function with an estimated EF >55%. No segmental wall motion abnormalities seen. Mild left ventricular hypertrophy. Normal right ventricular size and function. Left atrium is normal in size. Right atrium is normal in size. Unsuccessful bubble study attempt due to lost IV access. Normal aortic valve structure and function without stenosis or regurgitation. Normal aortic root dimension. Normal mitral valve structure and function. Trivial mitral regurgitation. Normal tricuspid valve structure and function. Insignificant tricuspid regurgitation, unable to estimate RVSP. The pulmonic valve is normal in structure. Trivial pulmonic insufficiency. IVC not well visualized. No significant pericardial effusion is seen. Signature ----------------------------------------------------------------------------  Electronically signed by Nitin Clark(Interpreting physician) on  06/07/2022 03:50 PM ---------------------------------------------------------------------------- ----------------------------------------------------------------------------  Electronically signed by Shoshana Olivera(Sonographer) on 06/07/2022 02:32  PM ---------------------------------------------------------------------------- FINDINGS Left Atrium Left atrium is normal in size. Unsuccessful bubble study attempt due to lost IV access. Left Ventricle Normal left ventricle size and function with an estimated EF >55%. No segmental wall motion abnormalities seen. Mild left ventricular hypertrophy. Right Atrium Right atrium is normal in size. Right Ventricle Normal right ventricular size and function. Mitral Valve Normal mitral valve structure and function. Trivial mitral regurgitation.  Aortic Valve Normal aortic valve structure and function without stenosis or regurgitation. Tricuspid Valve Normal tricuspid valve structure and function. Insignificant tricuspid regurgitation, unable to estimate RVSP. Pulmonic Valve The pulmonic valve is normal in structure. Trivial pulmonic insufficiency. Pericardial Effusion No significant pericardial effusion is seen. Miscellaneous Normal aortic root dimension. E/e' average 5.2 IVC not well visualized.  M-mode / 2D Measurements & Calculations:   LVIDd:4.2 cm(3.7 - 5.6 cm)       Diastolic TYDVFE:73.0 ml  UGONA:7.99 cm(2.2 - 4.0 cm)      Systolic LDPBRT:67 ml  OGEL:9.07 cm(0.6 - 1.1 cm)       Aortic Root:3.4 cm(2.0 - 3.7 cm)  LVPWd:0.94 cm(0.6 - 1.1 cm)      LA Dimension: 3.1 cm(1.9 - 4.0 cm)  Fractional Shortenin %       LA volume/Index: 17.3 ml /9m^2  Calculated LVEF (%): 78.41 %     LVOT:2.4 cm   Mitral:                              Aortic   Peak E-Wave: 0.60 m/s                Peak Velocity: 1.49 m/s  Peak A-Wave: 0.92 m/s                Mean Velocity: 1.03 m/s  E/A Ratio: 0.66                      Peak Gradient: 8.88 mmHg  Peak Gradient: 1.44 mmHg             Mean Gradient: 5 mmHg  Deceleration Time: 299 msec                                        Area (continuity): 3.06 cm^2                                       AV VTI: 30.6 cm  Septal Wall E' velocity:0.11 m/s Lateral Wall E' velocity:0.12 m/s    MRI BRAIN WO CONTRAST    Result Date: 2022  EXAMINATION: MRI OF THE BRAIN WITHOUT CONTRAST  2022 4:14 pm TECHNIQUE: Multiplanar multisequence MRI of the brain was performed without the administration of intravenous contrast. COMPARISON: CT head from earlier today, MRI brain 2015 HISTORY: ORDERING SYSTEM PROVIDED HISTORY: concern for stroke not seen on CT, posterior stroke involvement TECHNOLOGIST PROVIDED HISTORY: concern for stroke not seen on CT, posterior stroke involvement What is the sedation requirement?->None Decision Support Exception - unselect if not a suspected or confirmed emergency medical condition->Emergency Medical Condition (MA) Reason for Exam: previous stroke, right eye pain, dizziness, nausea. FINDINGS: INTRACRANIAL STRUCTURES/VENTRICLES: There is encephalomalacia in the bilateral frontal lobes, likely related to old infarctions. There is no acute infarct. No mass effect or midline shift. No evidence of an acute intracranial hemorrhage. The ventricles and sulci are normal in size and configuration. There is incidental partial empty sella. The normal signal voids within the major intracranial vessels appear maintained. ORBITS: The visualized portion of the orbits demonstrate no acute abnormality. SINUSES: The visualized paranasal sinuses and mastoid air cells demonstrate no acute abnormality. BONES/SOFT TISSUES: The bone marrow signal intensity appears normal. The soft tissues demonstrate no acute abnormality. No acute intracranial abnormality. Encephalomalacia in the bilateral frontal lobes, likely related to old infarctions, stable. On admission         Review of Systems:     Constitutional:  negative for chills, fevers, sweats  Respiratory:  negative for cough, dyspnea on exertion, hemoptysis, shortness of breath, wheezing  Cardiovascular:  negative for chest pain, chest pressure/discomfort, lower extremity edema, palpitations  Gastrointestinal:  negative for abdominal pain, constipation, diarrhea, nausea, vomiting  Neurological:  negative for dizziness, headache  Data:     Past Medical History:  no change     Social History:  no change    Family History: @no change    Vitals:      I/O (24Hr): Intake/Output Summary (Last 24 hours) at 6/8/2022 1526  Last data filed at 6/8/2022 1159  Gross per 24 hour   Intake 420 ml   Output 1075 ml   Net -655 ml       Labs:    Radiology:    Medications:      Allergies:      Current Meds:   Scheduled Meds:    polyethylene glycol  17 g Oral Daily    sodium chloride flush  10 mL IntraVENous 2 times per day    enoxaparin  40 mg SubCUTAneous Daily    aspirin  81 mg Oral Daily    Or    aspirin  300 mg Rectal Daily    atorvastatin  80 mg Oral Nightly    clopidogrel  75 mg Oral Daily    ezetimibe  10 mg Oral Daily    metoprolol tartrate  12.5 mg Oral BID    lisinopril  5 mg Oral Lunch     Continuous Infusions:    sodium chloride       PRN Meds: perflutren lipid microspheres, sodium chloride flush, sodium chloride flush, sodium chloride, ondansetron **OR** ondansetron, magnesium hydroxide, nitroGLYCERIN      Physical Examination:        /82   Pulse 94   Temp 97.7 °F (36.5 °C) (Oral)   Resp 18   Ht 5' 7\" (1.702 m)   Wt 190 lb 14.7 oz (86.6 kg)   SpO2 97%   BMI 25.89 kg/m²   Temp (24hrs), Av.8 °F (36.6 °C), Min:97.5 °F (36.4 °C), Max:97.9 °F (36.6 °C)    Recent Labs     22  1212   POCGLU 120*       Intake/Output Summary (Last 24 hours) at 2022 1526  Last data filed at 2022 1159  Gross per 24 hour   Intake 420 ml   Output 1075 ml   Net -655 ml       General Appearance:  alert, well appearing, and in no acute distress  Mental status:   Head:  normocephalic, atraumatic. Eye: no icterus, redness, pupils equal and reactive, extraocular eye movements intact, conjunctiva clear  Ear: normal external ear, no discharge, hearing intact  Nose:  no drainage noted  Mouth: mucous membranes moist  Neck: supple, no carotid bruits, thyroid not palpable  Lungs: Bilateral equal air entry, clear to ausculation, no wheezing, rales or rhonchi, normal effort  Cardiovascular: normal rate, regular rhythm, no murmur, gallop, rub.   Abdomen: Soft, nontender, nondistended, normal bowel sounds, no hepatomegaly or splenomegaly  Neurologic: There are no new focal motor or sensory deficits,   Skin: No gross lesions, rashes, bruising or bleeding on exposed skin area  Extremities:  peripheral pulses palpable, no pedal edema or calf pain with palpation  Psych:             Assessment:        Primary Problem  Dizziness    Principal Problem:    Dizziness  Active Problems:    Hx of completed stroke    Coronary artery disease involving native coronary artery of native heart without angina pectoris    Benign paroxysmal positional vertigo due to bilateral vestibular disorder    Hx pulmonary embolism    Hypertension  Resolved Problems:    * No resolved hospital problems. *       Plan:          6/8/22    ·       . Hospital Problems           Last Modified POA    * (Principal) Dizziness 6/7/2022 Yes    Hx of completed stroke 6/7/2022 Yes    Coronary artery disease involving native coronary artery of native heart without angina pectoris 6/7/2022 Yes    Benign paroxysmal positional vertigo due to bilateral vestibular disorder 6/7/2022 Yes    Hx pulmonary embolism 6/7/2022 Yes    Hypertension 6/7/2022 Yes                         Thanks for consulting us . Will monitor vitals and clinical course , and  Optimize therapy  as needed .            Dyan Garcia MD

## 2022-06-09 ENCOUNTER — CARE COORDINATION (OUTPATIENT)
Dept: CASE MANAGEMENT | Age: 62
End: 2022-06-09

## 2022-06-09 NOTE — CARE COORDINATION
[facility] Transitions Initial Follow Up Call- Harmeet Morales MD- one time call     Call within 2 business days of discharge: Yes    Patient: Joanne Link Patient : 1960   MRN: [id number]  Reason for Admission: dizziness, nausea, vomiting  Discharge Date: 22 RARS: No data recorded    Last Discharge [facility]       Complaint Diagnosis Description Type Department Provider    22 Dizziness Dizziness ED to Hosp-Admission (Discharged) (ADMITTED) Rafa Tavarez MD; Nayla Henderson. .. Spoke with: [facility]    Call to pt who states he is sleeping but fine. States still has some dizziness. States he was told not to drive. Writer offered SW help for this- he declined. He is not sure he can get a ride to cardiology appt 6/15. Again, writer offered help with this  He denies SOB, nausea/ vomiting, fever  States eating and drinking well. Adding gatorade as well. Bowels moving normally   Confirms DC lisinopril. Confirms other meds  He has card appt next Wed 6/15  Denies needs  Writer informs this is final (CTC) phone call- v/u. Encouraged call writer/ CTC or providers if questions or concerns- v/u. Episode closed      Transitions of Care Initial Call    Was this an external facility discharge? No Discharge Facility: [facility]    Challenges to be reviewed by the provider   Additional needs identified to be addressed with provider: No  none             Method of communication with provider : none    Advance Care Planning:   Does patient have an Advance Directive: decision maker updated. Care Transition Nurse contacted the patient by telephone to perform post hospital discharge assessment. Verified name and  with patient as identifiers. Provided introduction to self, and explanation of the CTN role. CTN reviewed discharge instructions, medical action plan and red flags with patient who verbalized understanding.  Patient given an opportunity to ask questions and does not have any further questions or concerns at this time. Were discharge instructions available to patient? Yes. Reviewed appropriate site of care based on symptoms and resources available to patient including: Specialist  When to call 911. The patient agrees to contact the PCP office for questions related to their healthcare. Medication reconciliation was performed with patient, who verbalizes understanding of administration of home medications. Advised obtaining a 90-day supply of all daily and as-needed medications. Was patient discharged with a pulse oximeter? no    CTN provided contact information. No further follow-up call indicated based on severity of symptoms and risk factors. Plan for next call: n/a          Non-face-to-face services provided:  Scheduled appointment with Specialist-confirms card appt 6/15  Obtained and reviewed discharge summary and/or continuity of care documents  Assessment and support for treatment adherence and medication management-confirms DC med and other meds left on list    Care Transitions 24 Hour Call    Do you have a copy of your discharge instructions?: Yes  Do you have all of your prescriptions and are they filled?: Yes  Have you scheduled your follow up appointment?: Yes  How are you going to get to your appointment?: Car - family or friend to transport  Do you feel like you have everything you need to keep you well at home?: Yes  Care Transitions Interventions         Follow Up  No future appointments.     Zuleika Manley RN

## 2022-06-10 ENCOUNTER — CARE COORDINATION (OUTPATIENT)
Dept: CARE COORDINATION | Age: 62
End: 2022-06-10

## 2022-06-10 NOTE — CARE COORDINATION
Social service received a referral to make contact with Branden Madrid regarding transportation to upcoming appointments. Branden Mercy reports that transportation is not an issue. Branden Madrid reports that he started driving yesterday and plans to continued driving as needed. Denied  assistance. Plan:    will notify KATINA.

## 2022-07-21 ENCOUNTER — HOSPITAL ENCOUNTER (OUTPATIENT)
Dept: NUCLEAR MEDICINE | Age: 62
End: 2022-07-21
Payer: MEDICARE

## 2022-07-21 ENCOUNTER — HOSPITAL ENCOUNTER (OUTPATIENT)
Dept: NUCLEAR MEDICINE | Age: 62
Discharge: HOME | End: 2022-07-23
Payer: MEDICARE

## 2022-07-21 ENCOUNTER — HOSPITAL ENCOUNTER (OUTPATIENT)
Dept: NON INVASIVE DIAGNOSTICS | Age: 62
Discharge: HOME | End: 2022-07-21
Payer: MEDICARE

## 2022-07-21 DIAGNOSIS — Z98.61 HISTORY OF PTCA: ICD-10-CM

## 2022-07-21 DIAGNOSIS — I25.10 ATHEROSCLEROSIS OF NATIVE CORONARY ARTERY, UNSPECIFIED WHETHER ANGINA PRESENT, UNSPECIFIED WHETHER NATIVE OR TRANSPLANTED HEART: ICD-10-CM

## 2022-07-21 PROCEDURE — A9500 TC99M SESTAMIBI: HCPCS | Performed by: NURSE PRACTITIONER

## 2022-07-21 PROCEDURE — 3430000000 HC RX DIAGNOSTIC RADIOPHARMACEUTICAL: Performed by: NURSE PRACTITIONER

## 2022-07-21 PROCEDURE — 2580000003 HC RX 258: Performed by: NURSE PRACTITIONER

## 2022-07-21 RX ORDER — AMINOPHYLLINE DIHYDRATE 25 MG/ML
50 INJECTION, SOLUTION INTRAVENOUS PRN
Status: ACTIVE | OUTPATIENT
Start: 2022-07-21 | End: 2022-07-21

## 2022-07-21 RX ORDER — ALBUTEROL SULFATE 90 UG/1
2 AEROSOL, METERED RESPIRATORY (INHALATION) PRN
Status: ACTIVE | OUTPATIENT
Start: 2022-07-21 | End: 2022-07-21

## 2022-07-21 RX ORDER — SODIUM CHLORIDE 0.9 % (FLUSH) 0.9 %
5-40 SYRINGE (ML) INJECTION PRN
Status: ACTIVE | OUTPATIENT
Start: 2022-07-21 | End: 2022-07-21

## 2022-07-21 RX ORDER — NITROGLYCERIN 0.4 MG/1
0.4 TABLET SUBLINGUAL EVERY 5 MIN PRN
Status: ACTIVE | OUTPATIENT
Start: 2022-07-21 | End: 2022-07-21

## 2022-07-21 RX ORDER — SODIUM CHLORIDE 9 MG/ML
500 INJECTION, SOLUTION INTRAVENOUS CONTINUOUS PRN
Status: ACTIVE | OUTPATIENT
Start: 2022-07-21 | End: 2022-07-21

## 2022-07-21 RX ORDER — SODIUM CHLORIDE 0.9 % (FLUSH) 0.9 %
10 SYRINGE (ML) INJECTION PRN
Status: DISCONTINUED | OUTPATIENT
Start: 2022-07-21 | End: 2022-07-24 | Stop reason: HOSPADM

## 2022-07-21 RX ORDER — METOPROLOL TARTRATE 5 MG/5ML
5 INJECTION INTRAVENOUS EVERY 5 MIN PRN
Status: ACTIVE | OUTPATIENT
Start: 2022-07-21 | End: 2022-07-21

## 2022-07-21 RX ORDER — ATROPINE SULFATE 0.1 MG/ML
0.5 INJECTION INTRAVENOUS EVERY 5 MIN PRN
Status: ACTIVE | OUTPATIENT
Start: 2022-07-21 | End: 2022-07-21

## 2022-07-21 RX ADMIN — Medication 10 ML: at 08:45

## 2022-07-21 RX ADMIN — TETRAKIS(2-METHOXYISOBUTYLISOCYANIDE)COPPER(I) TETRAFLUOROBORATE 11.1 MILLICURIE: 1 INJECTION, POWDER, LYOPHILIZED, FOR SOLUTION INTRAVENOUS at 08:45

## 2022-07-26 ENCOUNTER — HOSPITAL ENCOUNTER (OUTPATIENT)
Dept: NUCLEAR MEDICINE | Age: 62
Discharge: HOME | End: 2022-07-28
Payer: MEDICARE

## 2022-07-26 ENCOUNTER — HOSPITAL ENCOUNTER (OUTPATIENT)
Dept: NON INVASIVE DIAGNOSTICS | Age: 62
Discharge: HOME | End: 2022-07-26
Payer: MEDICARE

## 2022-07-26 LAB
LV EF: 80 %
LVEF MODALITY: NORMAL

## 2022-07-26 PROCEDURE — A9500 TC99M SESTAMIBI: HCPCS | Performed by: NURSE PRACTITIONER

## 2022-07-26 PROCEDURE — 78452 HT MUSCLE IMAGE SPECT MULT: CPT

## 2022-07-26 PROCEDURE — 6360000002 HC RX W HCPCS: Performed by: NURSE PRACTITIONER

## 2022-07-26 PROCEDURE — 2580000003 HC RX 258: Performed by: NURSE PRACTITIONER

## 2022-07-26 PROCEDURE — 93017 CV STRESS TEST TRACING ONLY: CPT

## 2022-07-26 PROCEDURE — 3430000000 HC RX DIAGNOSTIC RADIOPHARMACEUTICAL: Performed by: NURSE PRACTITIONER

## 2022-07-26 RX ORDER — SODIUM CHLORIDE 0.9 % (FLUSH) 0.9 %
5-40 SYRINGE (ML) INJECTION PRN
Status: DISCONTINUED | OUTPATIENT
Start: 2022-07-26 | End: 2022-07-26 | Stop reason: ALTCHOICE

## 2022-07-26 RX ORDER — SODIUM CHLORIDE 9 MG/ML
500 INJECTION, SOLUTION INTRAVENOUS CONTINUOUS PRN
Status: DISCONTINUED | OUTPATIENT
Start: 2022-07-26 | End: 2022-07-26 | Stop reason: ALTCHOICE

## 2022-07-26 RX ORDER — SODIUM CHLORIDE 0.9 % (FLUSH) 0.9 %
10 SYRINGE (ML) INJECTION PRN
Status: DISCONTINUED | OUTPATIENT
Start: 2022-07-26 | End: 2022-07-29 | Stop reason: HOSPADM

## 2022-07-26 RX ORDER — METOPROLOL TARTRATE 5 MG/5ML
5 INJECTION INTRAVENOUS EVERY 5 MIN PRN
Status: DISCONTINUED | OUTPATIENT
Start: 2022-07-26 | End: 2022-07-26 | Stop reason: ALTCHOICE

## 2022-07-26 RX ORDER — NITROGLYCERIN 0.4 MG/1
0.4 TABLET SUBLINGUAL EVERY 5 MIN PRN
Status: DISCONTINUED | OUTPATIENT
Start: 2022-07-26 | End: 2022-07-26 | Stop reason: ALTCHOICE

## 2022-07-26 RX ORDER — ATROPINE SULFATE 0.1 MG/ML
0.5 INJECTION INTRAVENOUS EVERY 5 MIN PRN
Status: DISCONTINUED | OUTPATIENT
Start: 2022-07-26 | End: 2022-07-26 | Stop reason: ALTCHOICE

## 2022-07-26 RX ORDER — AMINOPHYLLINE DIHYDRATE 25 MG/ML
50 INJECTION, SOLUTION INTRAVENOUS PRN
Status: DISCONTINUED | OUTPATIENT
Start: 2022-07-26 | End: 2022-07-26 | Stop reason: ALTCHOICE

## 2022-07-26 RX ORDER — ALBUTEROL SULFATE 90 UG/1
2 AEROSOL, METERED RESPIRATORY (INHALATION) PRN
Status: DISCONTINUED | OUTPATIENT
Start: 2022-07-26 | End: 2022-07-26 | Stop reason: ALTCHOICE

## 2022-07-26 RX ADMIN — TETRAKIS(2-METHOXYISOBUTYLISOCYANIDE)COPPER(I) TETRAFLUOROBORATE 40 MILLICURIE: 1 INJECTION, POWDER, LYOPHILIZED, FOR SOLUTION INTRAVENOUS at 10:25

## 2022-07-26 RX ADMIN — SODIUM CHLORIDE, PRESERVATIVE FREE 10 ML: 5 INJECTION INTRAVENOUS at 10:25

## 2022-07-26 RX ADMIN — REGADENOSON 0.4 MG: 0.08 INJECTION, SOLUTION INTRAVENOUS at 10:20

## 2022-07-26 RX ADMIN — SODIUM CHLORIDE, PRESERVATIVE FREE 10 ML: 5 INJECTION INTRAVENOUS at 09:32

## 2022-07-26 RX ADMIN — TETRAKIS(2-METHOXYISOBUTYLISOCYANIDE)COPPER(I) TETRAFLUOROBORATE 16 MILLICURIE: 1 INJECTION, POWDER, LYOPHILIZED, FOR SOLUTION INTRAVENOUS at 08:25

## 2022-07-26 RX ADMIN — SODIUM CHLORIDE, PRESERVATIVE FREE 10 ML: 5 INJECTION INTRAVENOUS at 10:20

## 2022-07-26 RX ADMIN — SODIUM CHLORIDE, PRESERVATIVE FREE 10 ML: 5 INJECTION INTRAVENOUS at 08:25

## 2022-07-26 NOTE — PROCEDURES
[address]                  [facility] [address]                              CARDIAC STRESS TEST    PATIENT NAME: Bren Loja                 :        1960  MED REC NO:   [id number]                             ROOM:  ACCOUNT NO:   [de-identified]                           ADMIT DATE: 2022  PROVIDER:     Carlyn Lee MD    DATE OF STUDY:  2022    ORDERING PROVIDER: Romero Barroso CNP    INTERPRETING PHYSICIAN: Dr. Imelda Alvarado:    Indication: CAD/stents    Procedure explained and consent signed. Medications: Lexiscan, 0.4 mg  Resting Heart rate: 77 bpm  Resting blood pressure:  130/85 mm/Hg  Infusion heart rate:  114 bpm  Infusion blood pressure:  144/88 mm/Hg  Resting EKG: Abnormal, Normal Sinus rhythm, Non-specific ST  abnormalities  Stress heart response: Normal Response  Stress BP response: Appropriate  Stress EKG(S): No changes seen  Chest discomfort: 2/10 Chest pain  Ischemic EKG changes: None    IMPRESSION:  Electrocardiographically Negative Lexiscan stress study. Radio-isotope  results to follow from the department of Nuclear Medicine.            Britt Wheeler MD    D: 2022 13:57:41       T: 2022 14:00:32     [id number]  Job#: [id number]     Doc#: Unknown    CC:

## 2023-02-07 ENCOUNTER — HOSPITAL ENCOUNTER (OUTPATIENT)
Age: 63
Discharge: HOME | End: 2023-02-07
Payer: MEDICARE

## 2023-02-07 LAB
ALBUMIN SERPL-MCNC: 4.1 G/DL (ref 3.5–5.2)
ALP SERPL-CCNC: 130 U/L (ref 40–129)
ALT SERPL-CCNC: 33 U/L (ref 5–41)
ANION GAP SERPL CALCULATED.3IONS-SCNC: 10 MMOL/L (ref 9–17)
AST SERPL-CCNC: 27 U/L
BILIRUB SERPL-MCNC: 0.5 MG/DL (ref 0.3–1.2)
BUN SERPL-MCNC: 17 MG/DL (ref 8–23)
CALCIUM SERPL-MCNC: 9 MG/DL (ref 8.6–10.4)
CHLORIDE SERPL-SCNC: 108 MMOL/L (ref 98–107)
CHOLEST SERPL-MCNC: 125 MG/DL
CHOLESTEROL/HDL RATIO: 2.9
CO2 SERPL-SCNC: 24 MMOL/L (ref 20–31)
CREAT SERPL-MCNC: 0.8 MG/DL (ref 0.7–1.2)
GFR SERPL CREATININE-BSD FRML MDRD: >60 ML/MIN/1.73M2
GLUCOSE SERPL-MCNC: 123 MG/DL (ref 70–99)
HDLC SERPL-MCNC: 43 MG/DL
LDLC SERPL CALC-MCNC: 65 MG/DL (ref 0–130)
POTASSIUM SERPL-SCNC: 4.2 MMOL/L (ref 3.7–5.3)
PROT SERPL-MCNC: 6.9 G/DL (ref 6.4–8.3)
SODIUM SERPL-SCNC: 142 MMOL/L (ref 135–144)
TRIGL SERPL-MCNC: 86 MG/DL

## 2023-02-07 PROCEDURE — 80053 COMPREHEN METABOLIC PANEL: CPT

## 2023-02-07 PROCEDURE — 80061 LIPID PANEL: CPT

## 2023-02-07 PROCEDURE — 36415 COLL VENOUS BLD VENIPUNCTURE: CPT

## 2023-08-07 ENCOUNTER — HOSPITAL ENCOUNTER (EMERGENCY)
Age: 63
Discharge: HOME | End: 2023-08-07
Attending: EMERGENCY MEDICINE
Payer: MEDICARE

## 2023-08-07 VITALS
RESPIRATION RATE: 20 BRPM | SYSTOLIC BLOOD PRESSURE: 145 MMHG | HEART RATE: 73 BPM | OXYGEN SATURATION: 96 % | TEMPERATURE: 97.6 F | BODY MASS INDEX: 24.38 KG/M2 | HEIGHT: 72 IN | DIASTOLIC BLOOD PRESSURE: 78 MMHG | WEIGHT: 180 LBS

## 2023-08-07 DIAGNOSIS — L03.213 PRESEPTAL CELLULITIS OF LEFT EYE: Primary | ICD-10-CM

## 2023-08-07 PROCEDURE — 99283 EMERGENCY DEPT VISIT LOW MDM: CPT

## 2023-08-07 PROCEDURE — 6370000000 HC RX 637 (ALT 250 FOR IP): Performed by: EMERGENCY MEDICINE

## 2023-08-07 RX ORDER — TETRACAINE HYDROCHLORIDE 5 MG/ML
2 SOLUTION OPHTHALMIC ONCE
Status: COMPLETED | OUTPATIENT
Start: 2023-08-07 | End: 2023-08-07

## 2023-08-07 RX ORDER — CEPHALEXIN 250 MG/1
500 CAPSULE ORAL ONCE
Status: COMPLETED | OUTPATIENT
Start: 2023-08-07 | End: 2023-08-07

## 2023-08-07 RX ORDER — SULFAMETHOXAZOLE AND TRIMETHOPRIM 800; 160 MG/1; MG/1
1 TABLET ORAL 2 TIMES DAILY
Qty: 20 TABLET | Refills: 0 | Status: SHIPPED | OUTPATIENT
Start: 2023-08-07 | End: 2023-08-17

## 2023-08-07 RX ORDER — CEPHALEXIN 500 MG/1
500 CAPSULE ORAL 4 TIMES DAILY
Qty: 40 CAPSULE | Refills: 0 | Status: SHIPPED | OUTPATIENT
Start: 2023-08-07 | End: 2023-08-17

## 2023-08-07 RX ORDER — SULFAMETHOXAZOLE AND TRIMETHOPRIM 800; 160 MG/1; MG/1
1 TABLET ORAL ONCE
Status: COMPLETED | OUTPATIENT
Start: 2023-08-07 | End: 2023-08-07

## 2023-08-07 RX ADMIN — CEPHALEXIN 500 MG: 250 CAPSULE ORAL at 07:15

## 2023-08-07 RX ADMIN — FLUORESCEIN SODIUM 1 MG: 1 STRIP OPHTHALMIC at 06:54

## 2023-08-07 RX ADMIN — SULFAMETHOXAZOLE AND TRIMETHOPRIM 1 TABLET: 800; 160 TABLET ORAL at 07:15

## 2023-08-07 RX ADMIN — TETRACAINE HYDROCHLORIDE 2 DROP: 5 SOLUTION OPHTHALMIC at 06:55

## 2023-08-07 ASSESSMENT — VISUAL ACUITY: OU: 1

## 2023-08-07 ASSESSMENT — ENCOUNTER SYMPTOMS
BACK PAIN: 0
EYE PAIN: 0
EYE REDNESS: 1
ABDOMINAL PAIN: 0
COLOR CHANGE: 0
SHORTNESS OF BREATH: 0

## 2023-08-07 ASSESSMENT — LIFESTYLE VARIABLES
HOW MANY STANDARD DRINKS CONTAINING ALCOHOL DO YOU HAVE ON A TYPICAL DAY: PATIENT DOES NOT DRINK
HOW OFTEN DO YOU HAVE A DRINK CONTAINING ALCOHOL: NEVER

## 2023-08-07 ASSESSMENT — PAIN DESCRIPTION - DESCRIPTORS: DESCRIPTORS: BURNING;ITCHING

## 2023-08-07 ASSESSMENT — PAIN DESCRIPTION - ORIENTATION: ORIENTATION: LEFT

## 2023-08-07 ASSESSMENT — PAIN DESCRIPTION - FREQUENCY: FREQUENCY: CONTINUOUS

## 2023-08-07 ASSESSMENT — PAIN SCALES - GENERAL: PAINLEVEL_OUTOF10: 3

## 2023-08-07 ASSESSMENT — TONOMETRY
OS_IOP_MMHG: 13
OD_IOP_MMHG: 13

## 2023-08-07 ASSESSMENT — PAIN DESCRIPTION - PAIN TYPE: TYPE: ACUTE PAIN

## 2023-08-07 ASSESSMENT — PAIN - FUNCTIONAL ASSESSMENT: PAIN_FUNCTIONAL_ASSESSMENT: 0-10

## 2023-08-07 ASSESSMENT — PAIN DESCRIPTION - LOCATION: LOCATION: EYE

## 2023-08-07 NOTE — ED TRIAGE NOTES
Mode of arrival (squad #, walk in, police, etc) : walk in        Chief complaint(s): eye swelling        Arrival Note (brief scenario, treatment PTA, etc). : left eye swelling since this morning. Pt reports burning and itching around his left eye.         C= \"Have you ever felt that you should Cut down on your drinking? \"  No  A= \"Have people Annoyed you by criticizing your drinking? \"  No  G= \"Have you ever felt bad or Guilty about your drinking? \"  No  E= \"Have you ever had a drink as an Eye-opener first thing in the morning to steady your nerves or to help a hangover? \"  No      Deferred []      Reason for deferring: N/A    *If yes to two or more: probable alcohol abuse. *

## 2023-08-07 NOTE — ED PROVIDER NOTES
Cerebral infarction [facility]) I63.9    Cerebral infarction due to embolism of left posterior cerebral artery (HCC) I67.794    Hyperglycemia R73.9    Hx pulmonary embolism Z86.711    Hx of hepatitis C Z86.19    Chronic pancreatitis (HCC) K86.1    Right sided weakness R53.1    NSTEMI (non-ST elevated myocardial infarction) ([address]) I21.4    Hypertension I10    L5 spinal cord injury ([address]) S34.105A    Pancreatitis K85.90    Hypercholesterolemia E78.00    S/P angioplasty with stent Z95.820    Dizziness R42    Hx of completed stroke Z86.73    Coronary artery disease involving native coronary artery of native heart without angina pectoris I25.10    Benign paroxysmal positional vertigo due to bilateral vestibular disorder H81.13     SURGICAL HISTORY       Past Surgical History:   Procedure Laterality Date    BACK SURGERY      LIVER BIOPSY       CURRENT MEDICATIONS       Previous Medications    ASPIRIN 81 MG CHEWABLE TABLET    Take 1 tablet by mouth daily    ATORVASTATIN (LIPITOR) 80 MG TABLET    Take 1 tablet by mouth nightly    CLOPIDOGREL (PLAVIX) 75 MG TABLET    Take 75 mg by mouth daily    EZETIMIBE (ZETIA) 10 MG TABLET    Take 10 mg by mouth daily    METOPROLOL TARTRATE (LOPRESSOR) 25 MG TABLET    Take 0.5 tablets by mouth 2 times daily    NITROGLYCERIN (NITROSTAT) 0.4 MG SL TABLET    up to max of 3 total doses. If no relief after 1 dose, call 911. ALLERGIES     is allergic to ativan [lorazepam]. FAMILY HISTORY     He indicated that the status of his mother is unknown. He indicated that the status of his father is unknown. He indicated that the status of his sister is unknown. He indicated that the status of his maternal grandmother is unknown. He indicated that the status of his maternal grandfather is unknown. He indicated that the status of his paternal grandmother is unknown. He indicated that the status of his paternal grandfather is unknown.      SOCIAL HISTORY       Social History     Tobacco Use    Smoking status:

## 2024-10-05 ENCOUNTER — APPOINTMENT (OUTPATIENT)
Dept: CT IMAGING | Age: 64
End: 2024-10-05
Payer: MEDICARE

## 2024-10-05 ENCOUNTER — HOSPITAL ENCOUNTER (EMERGENCY)
Age: 64
Discharge: HOME | End: 2024-10-05
Attending: STUDENT IN AN ORGANIZED HEALTH CARE EDUCATION/TRAINING PROGRAM
Payer: MEDICARE

## 2024-10-05 VITALS
RESPIRATION RATE: 16 BRPM | HEIGHT: 72 IN | TEMPERATURE: 98.4 F | DIASTOLIC BLOOD PRESSURE: 89 MMHG | HEART RATE: 77 BPM | OXYGEN SATURATION: 99 % | SYSTOLIC BLOOD PRESSURE: 121 MMHG | BODY MASS INDEX: 27.09 KG/M2 | WEIGHT: 200 LBS

## 2024-10-05 DIAGNOSIS — H43.392 VITREOUS FLOATERS OF LEFT EYE: Primary | ICD-10-CM

## 2024-10-05 LAB
ANION GAP SERPL CALCULATED.3IONS-SCNC: 9 MMOL/L (ref 9–17)
BASOPHILS # BLD: 0.1 K/UL (ref 0–0.2)
BASOPHILS NFR BLD: 1 % (ref 0–2)
BUN SERPL-MCNC: 16 MG/DL (ref 8–23)
CALCIUM SERPL-MCNC: 9.2 MG/DL (ref 8.6–10.4)
CHLORIDE SERPL-SCNC: 106 MMOL/L (ref 98–107)
CO2 SERPL-SCNC: 26 MMOL/L (ref 20–31)
CREAT SERPL-MCNC: 0.9 MG/DL (ref 0.7–1.2)
EOSINOPHIL # BLD: 0.1 K/UL (ref 0–0.4)
EOSINOPHILS RELATIVE PERCENT: 2 % (ref 0–4)
ERYTHROCYTE [DISTWIDTH] IN BLOOD BY AUTOMATED COUNT: 13.5 % (ref 11.5–14.9)
GFR, ESTIMATED: >90 ML/MIN/1.73M2
GLUCOSE BLD-MCNC: 104 MG/DL (ref 75–110)
GLUCOSE SERPL-MCNC: 155 MG/DL (ref 70–99)
HCT VFR BLD AUTO: 44.6 % (ref 41–53)
HGB BLD-MCNC: 15.5 G/DL (ref 13.5–17.5)
LYMPHOCYTES NFR BLD: 2.9 K/UL (ref 1–4.8)
LYMPHOCYTES RELATIVE PERCENT: 42 % (ref 24–44)
MCH RBC QN AUTO: 30.7 PG (ref 26–34)
MCHC RBC AUTO-ENTMCNC: 34.7 G/DL (ref 31–37)
MCV RBC AUTO: 88.5 FL (ref 80–100)
MONOCYTES NFR BLD: 0.6 K/UL (ref 0.1–1.3)
MONOCYTES NFR BLD: 8 % (ref 1–7)
NEUTROPHILS NFR BLD: 47 % (ref 36–66)
NEUTS SEG NFR BLD: 3.3 K/UL (ref 1.3–9.1)
PLATELET # BLD AUTO: 202 K/UL (ref 150–450)
PMV BLD AUTO: 7.6 FL (ref 6–12)
POTASSIUM SERPL-SCNC: 4 MMOL/L (ref 3.7–5.3)
RBC # BLD AUTO: 5.04 M/UL (ref 4.5–5.9)
SODIUM SERPL-SCNC: 141 MMOL/L (ref 135–144)
WBC OTHER # BLD: 7 K/UL (ref 3.5–11)

## 2024-10-05 PROCEDURE — 99285 EMERGENCY DEPT VISIT HI MDM: CPT

## 2024-10-05 PROCEDURE — 2580000003 HC RX 258

## 2024-10-05 PROCEDURE — 36415 COLL VENOUS BLD VENIPUNCTURE: CPT

## 2024-10-05 PROCEDURE — 70450 CT HEAD/BRAIN W/O DYE: CPT

## 2024-10-05 PROCEDURE — 70498 CT ANGIOGRAPHY NECK: CPT

## 2024-10-05 PROCEDURE — 80048 BASIC METABOLIC PNL TOTAL CA: CPT

## 2024-10-05 PROCEDURE — 6360000004 HC RX CONTRAST MEDICATION

## 2024-10-05 PROCEDURE — 82947 ASSAY GLUCOSE BLOOD QUANT: CPT

## 2024-10-05 PROCEDURE — 85025 COMPLETE CBC W/AUTO DIFF WBC: CPT

## 2024-10-05 RX ORDER — 0.9 % SODIUM CHLORIDE 0.9 %
100 INTRAVENOUS SOLUTION INTRAVENOUS ONCE
Status: COMPLETED | OUTPATIENT
Start: 2024-10-05 | End: 2024-10-05

## 2024-10-05 RX ORDER — SODIUM CHLORIDE 0.9 % (FLUSH) 0.9 %
10 SYRINGE (ML) INJECTION PRN
Status: DISCONTINUED | OUTPATIENT
Start: 2024-10-05 | End: 2024-10-06 | Stop reason: HOSPADM

## 2024-10-05 RX ORDER — IOPAMIDOL 755 MG/ML
75 INJECTION, SOLUTION INTRAVASCULAR
Status: COMPLETED | OUTPATIENT
Start: 2024-10-05 | End: 2024-10-05

## 2024-10-05 RX ADMIN — SODIUM CHLORIDE, PRESERVATIVE FREE 10 ML: 5 INJECTION INTRAVENOUS at 21:56

## 2024-10-05 RX ADMIN — IOPAMIDOL 75 ML: 755 INJECTION, SOLUTION INTRAVENOUS at 21:56

## 2024-10-05 RX ADMIN — SODIUM CHLORIDE 100 ML: 9 INJECTION, SOLUTION INTRAVENOUS at 21:57

## 2024-10-05 ASSESSMENT — LIFESTYLE VARIABLES
HOW OFTEN DO YOU HAVE A DRINK CONTAINING ALCOHOL: NEVER
HOW MANY STANDARD DRINKS CONTAINING ALCOHOL DO YOU HAVE ON A TYPICAL DAY: PATIENT DOES NOT DRINK

## 2024-10-05 ASSESSMENT — PAIN SCALES - GENERAL: PAINLEVEL_OUTOF10: 4

## 2024-10-06 NOTE — ED PROVIDER NOTES
EMERGENCY DEPARTMENT ENCOUNTER   ATTENDING ATTESTATION     Pt Name: Rommel Marie  MRN: [id number]  Birthdate 1960  Date of evaluation: 10/5/24       Rommel Marie is a 64 y.o. male who presents with Eye Problem    64-year-old male does have a history of hypertension, CAD, prior CVA presenting with an eye issue    Patient states since 10 AM yesterday he has been having a sensation of something floating in his vision on the left eye.  He describes it as a spider dangling down.    Occasionally when he looks to the left he does see a flash    He has no pain in the eye.  No double vision no blurry vision no other symptoms such as headache numbness tingling weakness slurred speech        MDM:     Workup is reassuring normal CT head normal CTA    Performed bedside ultrasound    Patient appears to either have vitreous hemorrhage or vitreous detachment    There is no evidence of retinal detachment    His vision is 20/20.    Will discharge with close ophthalmology follow-up and return precaution    Vitals:   Vitals:    10/05/24 2019   BP: (!) 114/99   Pulse: 82   Resp: 18   Temp: 98.7 °F (37.1 °C)   TempSrc: Oral   SpO2: 99%   Weight: 90.7 kg (200 lb)   Height: 1.829 m (6')         I personally saw and examined the patient. I have reviewed and agree with the resident's findings, including all diagnostic interpretations and treatment plan as written. I was present for the key portions of any procedures performed and the inclusive time noted for any critical care statement.    Golden Harry MD  Attending Emergency Physician            Golden Harry MD  10/05/24 3300    
see ophthalmology as soon as possible.    See ED course.    Amount and/or Complexity of Data Reviewed  Labs: ordered.  Radiology: ordered. Decision-making details documented in ED Course.    Risk  Prescription drug management.        EKG      All EKG's are interpreted by the Emergency Department Physician who either signs or Co-signs this chart in the absence of a cardiologist.    EMERGENCY DEPARTMENT COURSE:      ED Course as of 10/06/24 0116   Sat Oct 05, 2024   2052 CBC reassuring for no acute process. [WH]   2108 BMP reassuring for no acute process [WH]   2256 CT HEAD WO CONTRAST  IMPRESSION:  1. No acute intracranial abnormality.  2. 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  Stable compared to 06/06/2022.  3. Unremarkable CTA of the head and neck.   [WH]   2256 CTA HEAD NECK W CONTRAST  IMPRESSION:  1. No acute intracranial abnormality.  2. 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  Stable compared to 06/06/2022.  3. Unremarkable CTA of the head and neck.   [WH]   2256 Vision 20/20.  No double vision, blurry vision, other vision problems.  Bedside ultrasound showing vitreous hemorrhage or vitreous detachment.  Will follow-up with ophthalmology outpatient.  Given strict term cautions.  Given strict follow-up instructions.  All questions answered.  Patient was stable on discharge. [WH]      ED Course User Index  [WH] Quinn Parsons MD       PROCEDURES:      CONSULTS:  None    CRITICAL CARE:  There was significant risk of life threatening deterioration of patient's condition requiring my direct management. Critical care time 0 minutes, excluding any documented procedures.    FINAL IMPRESSION      1. Vitreous floaters of left eye          DISPOSITION / PLAN     DISPOSITION Decision To Discharge 10/05/2024 10:57:42 PM  Condition at Disposition: Data Unavailable      PATIENT REFERRED TO:  Chioma Merino MD  [address] [place] [address]

## 2024-10-06 NOTE — DISCHARGE INSTRUCTIONS
You were seen here for flashes and floaters.    You have no acute pathology currently.  Your ultrasound is consistent with your signs and symptoms, of flashes and floaters.    It is imperative you follow-up with ophthalmology as soon as possible.  Please call make an appointment as soon as possible.    You may return to the emergency department with any new or worsening symptoms.  You may return if your symptoms do not improve.  Please return if your vision starts to decrease, you have double vision, blurry vision, decreased acuity in your vision.    Please follow-up with your primary care provider

## 2024-11-04 ENCOUNTER — APPOINTMENT (OUTPATIENT)
Dept: CT IMAGING | Age: 64
DRG: 322 | End: 2024-11-04
Payer: MEDICARE

## 2024-11-04 ENCOUNTER — APPOINTMENT (OUTPATIENT)
Dept: GENERAL RADIOLOGY | Age: 64
DRG: 322 | End: 2024-11-04
Attending: EMERGENCY MEDICINE
Payer: MEDICARE

## 2024-11-04 ENCOUNTER — HOSPITAL ENCOUNTER (INPATIENT)
Age: 64
LOS: 1 days | Discharge: ANOTHER HOSPITAL | DRG: 322 | End: 2024-11-05
Attending: EMERGENCY MEDICINE | Admitting: INTERNAL MEDICINE
Payer: MEDICARE

## 2024-11-04 DIAGNOSIS — I21.4 NSTEMI (NON-ST ELEVATED MYOCARDIAL INFARCTION) (HCC): Primary | ICD-10-CM

## 2024-11-04 DIAGNOSIS — I73.9 PERIPHERAL ARTERIAL DISEASE (HCC): ICD-10-CM

## 2024-11-04 LAB
ABSOLUTE BANDS: 0.14 K/UL (ref 0–1)
ALBUMIN SERPL-MCNC: 3.8 G/DL (ref 3.5–5.2)
ALP SERPL-CCNC: 104 U/L (ref 40–129)
ALT SERPL-CCNC: 23 U/L (ref 10–50)
ANION GAP SERPL CALCULATED.3IONS-SCNC: 12 MMOL/L (ref 9–16)
AST SERPL-CCNC: 30 U/L (ref 10–50)
BANDS: 2 % (ref 0–10)
BASOPHILS # BLD: 0 K/UL (ref 0–0.2)
BASOPHILS NFR BLD: 0 % (ref 0–2)
BILIRUB SERPL-MCNC: 0.3 MG/DL (ref 0–1.2)
BUN SERPL-MCNC: 16 MG/DL (ref 8–23)
CALCIUM SERPL-MCNC: 9.3 MG/DL (ref 8.6–10.4)
CHLORIDE SERPL-SCNC: 107 MMOL/L (ref 98–107)
CO2 SERPL-SCNC: 22 MMOL/L (ref 20–31)
CREAT SERPL-MCNC: 1 MG/DL (ref 0.7–1.2)
EKG Q-T INTERVAL: 432 MS
EKG QRS DURATION: 74 MS
EKG QTC CALCULATION (BAZETT): 438 MS
EKG R AXIS: -19 DEGREES
EKG T AXIS: 58 DEGREES
EKG VENTRICULAR RATE: 62 BPM
EOSINOPHIL # BLD: 0.07 K/UL (ref 0–0.4)
EOSINOPHILS RELATIVE PERCENT: 1 % (ref 0–4)
ERYTHROCYTE [DISTWIDTH] IN BLOOD BY AUTOMATED COUNT: 13.6 % (ref 11.5–14.9)
GFR, ESTIMATED: 84 ML/MIN/1.73M2
GLUCOSE SERPL-MCNC: 145 MG/DL (ref 74–99)
HCT VFR BLD AUTO: 40.7 % (ref 41–53)
HGB BLD-MCNC: 13.7 G/DL (ref 13.5–17.5)
LIPASE SERPL-CCNC: 14 U/L (ref 13–60)
LYMPHOCYTES NFR BLD: 1.56 K/UL (ref 1–4.8)
LYMPHOCYTES RELATIVE PERCENT: 22 % (ref 24–44)
MAGNESIUM SERPL-MCNC: 1.9 MG/DL (ref 1.6–2.4)
MCH RBC QN AUTO: 30.1 PG (ref 26–34)
MCHC RBC AUTO-ENTMCNC: 33.6 G/DL (ref 31–37)
MCV RBC AUTO: 89.5 FL (ref 80–100)
MONOCYTES NFR BLD: 0.21 K/UL (ref 0.1–1.3)
MONOCYTES NFR BLD: 3 % (ref 1–7)
MORPHOLOGY: ABNORMAL
NEUTROPHILS NFR BLD: 72 % (ref 36–66)
NEUTS SEG NFR BLD: 5.12 K/UL (ref 1.3–9.1)
PLATELET # BLD AUTO: 202 K/UL (ref 150–450)
PMV BLD AUTO: 7.5 FL (ref 6–12)
POTASSIUM SERPL-SCNC: 4.1 MMOL/L (ref 3.7–5.3)
PROT SERPL-MCNC: 6.5 G/DL (ref 6.6–8.7)
RBC # BLD AUTO: 4.55 M/UL (ref 4.5–5.9)
SODIUM SERPL-SCNC: 141 MMOL/L (ref 136–145)
TROPONIN I SERPL HS-MCNC: 21 NG/L (ref 0–22)
TROPONIN I SERPL HS-MCNC: 41 NG/L (ref 0–22)
WBC OTHER # BLD: 7.1 K/UL (ref 3.5–11)

## 2024-11-04 PROCEDURE — 80053 COMPREHEN METABOLIC PANEL: CPT

## 2024-11-04 PROCEDURE — 96376 TX/PRO/DX INJ SAME DRUG ADON: CPT

## 2024-11-04 PROCEDURE — 96374 THER/PROPH/DIAG INJ IV PUSH: CPT

## 2024-11-04 PROCEDURE — 71045 X-RAY EXAM CHEST 1 VIEW: CPT

## 2024-11-04 PROCEDURE — 85025 COMPLETE CBC W/AUTO DIFF WBC: CPT

## 2024-11-04 PROCEDURE — 2060000000 HC ICU INTERMEDIATE R&B

## 2024-11-04 PROCEDURE — 99285 EMERGENCY DEPT VISIT HI MDM: CPT

## 2024-11-04 PROCEDURE — 36415 COLL VENOUS BLD VENIPUNCTURE: CPT

## 2024-11-04 PROCEDURE — 6360000004 HC RX CONTRAST MEDICATION: Performed by: EMERGENCY MEDICINE

## 2024-11-04 PROCEDURE — 2580000003 HC RX 258: Performed by: NURSE PRACTITIONER

## 2024-11-04 PROCEDURE — 6370000000 HC RX 637 (ALT 250 FOR IP): Performed by: NURSE PRACTITIONER

## 2024-11-04 PROCEDURE — 84484 ASSAY OF TROPONIN QUANT: CPT

## 2024-11-04 PROCEDURE — 83690 ASSAY OF LIPASE: CPT

## 2024-11-04 PROCEDURE — 74174 CTA ABD&PLVS W/CONTRAST: CPT

## 2024-11-04 PROCEDURE — 2580000003 HC RX 258: Performed by: EMERGENCY MEDICINE

## 2024-11-04 PROCEDURE — 83735 ASSAY OF MAGNESIUM: CPT

## 2024-11-04 PROCEDURE — 6360000002 HC RX W HCPCS: Performed by: EMERGENCY MEDICINE

## 2024-11-04 PROCEDURE — 6360000002 HC RX W HCPCS: Performed by: NURSE PRACTITIONER

## 2024-11-04 RX ORDER — CLOPIDOGREL BISULFATE 75 MG/1
75 TABLET ORAL DAILY
Status: DISCONTINUED | OUTPATIENT
Start: 2024-11-04 | End: 2024-11-05 | Stop reason: HOSPADM

## 2024-11-04 RX ORDER — FLUTICASONE PROPIONATE 50 MCG
1 SPRAY, SUSPENSION (ML) NASAL DAILY
COMMUNITY

## 2024-11-04 RX ORDER — ONDANSETRON 2 MG/ML
4 INJECTION INTRAMUSCULAR; INTRAVENOUS EVERY 6 HOURS PRN
Status: DISCONTINUED | OUTPATIENT
Start: 2024-11-04 | End: 2024-11-05 | Stop reason: HOSPADM

## 2024-11-04 RX ORDER — ACETAMINOPHEN 650 MG/1
650 SUPPOSITORY RECTAL EVERY 6 HOURS PRN
Status: DISCONTINUED | OUTPATIENT
Start: 2024-11-04 | End: 2024-11-05 | Stop reason: HOSPADM

## 2024-11-04 RX ORDER — SODIUM CHLORIDE 9 MG/ML
INJECTION, SOLUTION INTRAVENOUS PRN
Status: DISCONTINUED | OUTPATIENT
Start: 2024-11-04 | End: 2024-11-05 | Stop reason: HOSPADM

## 2024-11-04 RX ORDER — ATORVASTATIN CALCIUM 80 MG/1
80 TABLET, FILM COATED ORAL EVERY EVENING
Status: DISCONTINUED | OUTPATIENT
Start: 2024-11-04 | End: 2024-11-05 | Stop reason: HOSPADM

## 2024-11-04 RX ORDER — ACETAMINOPHEN 325 MG/1
650 TABLET ORAL EVERY 6 HOURS PRN
Status: DISCONTINUED | OUTPATIENT
Start: 2024-11-04 | End: 2024-11-05 | Stop reason: HOSPADM

## 2024-11-04 RX ORDER — NITROGLYCERIN 0.4 MG/1
0.4 TABLET SUBLINGUAL EVERY 5 MIN PRN
Status: DISCONTINUED | OUTPATIENT
Start: 2024-11-04 | End: 2024-11-05 | Stop reason: HOSPADM

## 2024-11-04 RX ORDER — SODIUM CHLORIDE 0.9 % (FLUSH) 0.9 %
10 SYRINGE (ML) INJECTION PRN
Status: DISCONTINUED | OUTPATIENT
Start: 2024-11-04 | End: 2024-11-05 | Stop reason: HOSPADM

## 2024-11-04 RX ORDER — SODIUM CHLORIDE 0.9 % (FLUSH) 0.9 %
5-40 SYRINGE (ML) INJECTION EVERY 12 HOURS SCHEDULED
Status: DISCONTINUED | OUTPATIENT
Start: 2024-11-04 | End: 2024-11-05 | Stop reason: HOSPADM

## 2024-11-04 RX ORDER — HEPARIN SODIUM 1000 [USP'U]/ML
2000 INJECTION, SOLUTION INTRAVENOUS; SUBCUTANEOUS PRN
Status: DISCONTINUED | OUTPATIENT
Start: 2024-11-04 | End: 2024-11-05 | Stop reason: HOSPADM

## 2024-11-04 RX ORDER — EZETIMIBE 10 MG/1
10 TABLET ORAL EVERY OTHER DAY
Status: DISCONTINUED | OUTPATIENT
Start: 2024-11-04 | End: 2024-11-05 | Stop reason: HOSPADM

## 2024-11-04 RX ORDER — 0.9 % SODIUM CHLORIDE 0.9 %
100 INTRAVENOUS SOLUTION INTRAVENOUS ONCE
Status: COMPLETED | OUTPATIENT
Start: 2024-11-04 | End: 2024-11-04

## 2024-11-04 RX ORDER — HEPARIN SODIUM 1000 [USP'U]/ML
4000 INJECTION, SOLUTION INTRAVENOUS; SUBCUTANEOUS ONCE
Status: COMPLETED | OUTPATIENT
Start: 2024-11-04 | End: 2024-11-04

## 2024-11-04 RX ORDER — MAGNESIUM SULFATE HEPTAHYDRATE 40 MG/ML
2000 INJECTION, SOLUTION INTRAVENOUS PRN
Status: DISCONTINUED | OUTPATIENT
Start: 2024-11-04 | End: 2024-11-05 | Stop reason: HOSPADM

## 2024-11-04 RX ORDER — BISACODYL 10 MG
10 SUPPOSITORY, RECTAL RECTAL DAILY PRN
Status: DISCONTINUED | OUTPATIENT
Start: 2024-11-04 | End: 2024-11-05 | Stop reason: HOSPADM

## 2024-11-04 RX ORDER — POTASSIUM CHLORIDE 1500 MG/1
40 TABLET, EXTENDED RELEASE ORAL PRN
Status: DISCONTINUED | OUTPATIENT
Start: 2024-11-04 | End: 2024-11-05 | Stop reason: HOSPADM

## 2024-11-04 RX ORDER — POLYETHYLENE GLYCOL 3350 17 G/17G
17 POWDER, FOR SOLUTION ORAL DAILY PRN
Status: DISCONTINUED | OUTPATIENT
Start: 2024-11-04 | End: 2024-11-05 | Stop reason: HOSPADM

## 2024-11-04 RX ORDER — METOPROLOL TARTRATE 25 MG/1
25 TABLET, FILM COATED ORAL DAILY
Status: DISCONTINUED | OUTPATIENT
Start: 2024-11-04 | End: 2024-11-05 | Stop reason: HOSPADM

## 2024-11-04 RX ORDER — ONDANSETRON 4 MG/1
4 TABLET, ORALLY DISINTEGRATING ORAL EVERY 8 HOURS PRN
Status: DISCONTINUED | OUTPATIENT
Start: 2024-11-04 | End: 2024-11-05 | Stop reason: HOSPADM

## 2024-11-04 RX ORDER — LANOLIN ALCOHOL/MO/W.PET/CERES
3 CREAM (GRAM) TOPICAL NIGHTLY PRN
Status: DISCONTINUED | OUTPATIENT
Start: 2024-11-04 | End: 2024-11-05 | Stop reason: HOSPADM

## 2024-11-04 RX ORDER — HEPARIN SODIUM 10000 [USP'U]/100ML
5-30 INJECTION, SOLUTION INTRAVENOUS CONTINUOUS
Status: DISCONTINUED | OUTPATIENT
Start: 2024-11-04 | End: 2024-11-05 | Stop reason: HOSPADM

## 2024-11-04 RX ORDER — HEPARIN SODIUM 1000 [USP'U]/ML
4000 INJECTION, SOLUTION INTRAVENOUS; SUBCUTANEOUS PRN
Status: DISCONTINUED | OUTPATIENT
Start: 2024-11-04 | End: 2024-11-05 | Stop reason: HOSPADM

## 2024-11-04 RX ORDER — ASPIRIN 81 MG/1
81 TABLET, CHEWABLE ORAL DAILY
Status: DISCONTINUED | OUTPATIENT
Start: 2024-11-04 | End: 2024-11-05 | Stop reason: HOSPADM

## 2024-11-04 RX ORDER — IOPAMIDOL 755 MG/ML
100 INJECTION, SOLUTION INTRAVASCULAR
Status: COMPLETED | OUTPATIENT
Start: 2024-11-04 | End: 2024-11-04

## 2024-11-04 RX ADMIN — IOPAMIDOL 100 ML: 755 INJECTION, SOLUTION INTRAVENOUS at 16:20

## 2024-11-04 RX ADMIN — SODIUM CHLORIDE, PRESERVATIVE FREE 10 ML: 5 INJECTION INTRAVENOUS at 20:20

## 2024-11-04 RX ADMIN — ATORVASTATIN CALCIUM 80 MG: 80 TABLET, FILM COATED ORAL at 20:16

## 2024-11-04 RX ADMIN — SODIUM CHLORIDE, PRESERVATIVE FREE 10 ML: 5 INJECTION INTRAVENOUS at 16:20

## 2024-11-04 RX ADMIN — HEPARIN SODIUM 4000 UNITS: 1000 INJECTION INTRAVENOUS; SUBCUTANEOUS at 18:35

## 2024-11-04 RX ADMIN — SODIUM CHLORIDE 100 ML: 9 INJECTION, SOLUTION INTRAVENOUS at 16:20

## 2024-11-04 RX ADMIN — HYDROMORPHONE HYDROCHLORIDE 1 MG: 1 INJECTION, SOLUTION INTRAMUSCULAR; INTRAVENOUS; SUBCUTANEOUS at 20:16

## 2024-11-04 RX ADMIN — HEPARIN SODIUM 11 UNITS/KG/HR: 10000 INJECTION, SOLUTION INTRAVENOUS at 18:40

## 2024-11-04 RX ADMIN — HYDROMORPHONE HYDROCHLORIDE 1 MG: 1 INJECTION, SOLUTION INTRAMUSCULAR; INTRAVENOUS; SUBCUTANEOUS at 15:18

## 2024-11-04 RX ADMIN — HYDROMORPHONE HYDROCHLORIDE 1 MG: 1 INJECTION, SOLUTION INTRAMUSCULAR; INTRAVENOUS; SUBCUTANEOUS at 14:13

## 2024-11-04 ASSESSMENT — PAIN - FUNCTIONAL ASSESSMENT
PAIN_FUNCTIONAL_ASSESSMENT: ACTIVITIES ARE NOT PREVENTED
PAIN_FUNCTIONAL_ASSESSMENT: 0-10

## 2024-11-04 ASSESSMENT — PAIN SCALES - GENERAL
PAINLEVEL_OUTOF10: 10
PAINLEVEL_OUTOF10: 10
PAINLEVEL_OUTOF10: 8
PAINLEVEL_OUTOF10: 9
PAINLEVEL_OUTOF10: 10
PAINLEVEL_OUTOF10: 9
PAINLEVEL_OUTOF10: 4

## 2024-11-04 ASSESSMENT — PAIN DESCRIPTION - ORIENTATION: ORIENTATION: LEFT

## 2024-11-04 ASSESSMENT — PAIN DESCRIPTION - LOCATION: LOCATION: BACK

## 2024-11-04 ASSESSMENT — PAIN DESCRIPTION - DESCRIPTORS: DESCRIPTORS: SHARP

## 2024-11-04 ASSESSMENT — PAIN SCALES - WONG BAKER: WONGBAKER_NUMERICALRESPONSE: NO HURT

## 2024-11-04 NOTE — PROGRESS NOTES
[facility] Internal Medicine  Cecil Lundberg MD; Lucius Colon MD; Shai Ivory MD; MD Laurie Michelle MD; Glenis Sterling MD  [facility] Internal Medicine   IN-PATIENT SERVICE  [facility]                 Date:   11/5/2024  Patientname:  Rommel Marie  Date of admission:  11/4/2024  1:24 PM  MRN:   [id number]  Account:  [id number]  YOB: 1960  PCP:    No primary care provider on file.  Room:   [id number]  Code Status:    Full Code      Chief Complaint:     Chief Complaint   Patient presents with    Chest Pain       History of Present Illness:     Rommel Marie is a 64 y.o. Non- / non  male who presents with Chest Pain   and is admitted to the hospital for the management of NSTEMI (non-ST elevated myocardial infarction) (HCC).    Patient's medical history significant for cerebral infarct, chronic pancreatitis -s/p implanted morphine pump, CAD-s/p angioplasty with stent, history of PE, hypercholesterolemia, and HTN.    According to patient, at 11:00 this morning he finished mowing his lawn and developed a sudden, sharp pain below his left axilla that felt as if it was stabbing through into his back.  Reports that he immediately became diaphoretic, with cold clammy skin.  Denies shortness of breath pain was associated with some brief nausea.  Patient reports that EMS administered ASA and NTG with no improvement in symptoms symptoms are aggravated with stress and exertion and improved after pain medications.  Patient denies trauma to the area.  Pain is not reproducible; however, reports that pain actually improves when lifting his arms over his head.  Patient reports that he had a heart attack in 2020 with stent placed in his LAD.  Patient reports that he lives a very active lifestyle and rides a stationary bike 10 miles daily in his basement.    CXR obtained in ED shows no acute process.  Troponin high-sensitivity 21, then 41.  EKG  Lymphocytes % 22 (L) 24 - 44 %    Monocytes % 3 1 - 7 %    Eosinophils % 1 0 - 4 %    Basophils % 0 0 - 2 %    Bands 2 0 - 10 %    Neutrophils Absolute 5.12 1.3 - 9.1 k/uL    Lymphocytes Absolute 1.56 1.0 - 4.8 k/uL    Monocytes Absolute 0.21 0.1 - 1.3 k/uL    Eosinophils Absolute 0.07 0.0 - 0.4 k/uL    Basophils Absolute 0.00 0.0 - 0.2 k/uL    Absolute Bands 0.14 0.0 - 1.0 k/uL    Morphology ANISOCYTOSIS PRESENT     Morphology 1+ ELLIPTOCYTES     Morphology 1+ TEARDROPS    Comprehensive Metabolic Panel    Collection Time: 11/04/24  2:13 PM   Result Value Ref Range    Sodium 141 136 - 145 mmol/L    Potassium 4.1 3.7 - 5.3 mmol/L    Chloride 107 98 - 107 mmol/L    CO2 22 20 - 31 mmol/L    Anion Gap 12 9 - 16 mmol/L    Glucose 145 (H) 74 - 99 mg/dL    BUN 16 8 - 23 mg/dL    Creatinine 1.0 0.7 - 1.2 mg/dL    Est, Glom Filt Rate 84 >60 mL/min/1.73m2    Calcium 9.3 8.6 - 10.4 mg/dL    Total Protein 6.5 (L) 6.6 - 8.7 g/dL    Albumin 3.8 3.5 - 5.2 g/dL    Total Bilirubin 0.3 0.0 - 1.2 mg/dL    Alkaline Phosphatase 104 40 - 129 U/L    ALT 23 10 - 50 U/L    AST 30 10 - 50 U/L   Troponin    Collection Time: 11/04/24  2:13 PM   Result Value Ref Range    Troponin, High Sensitivity 21 0 - 22 ng/L   Magnesium    Collection Time: 11/04/24  2:13 PM   Result Value Ref Range    Magnesium 1.9 1.6 - 2.4 mg/dL   Lipase    Collection Time: 11/04/24  2:13 PM   Result Value Ref Range    Lipase 14 13 - 60 U/L   Troponin    Collection Time: 11/04/24  3:06 PM   Result Value Ref Range    Troponin, High Sensitivity 41 (H) 0 - 22 ng/L       Imaging/Diagnostics:  CTA CHEST ABDOMEN PELVIS W CONTRAST    Result Date: 11/4/2024  1. No evidence of pulmonary embolism. 2. No evidence of thoracic or abdominal aortic dissection or aneurysm. 3. Focal absence of contrast in the proximal right common iliac artery suggesting high-grade stenosis/preocclusion with flow demonstrated distally. 4. Mild colonic diverticulosis. 5. COPD.     XR CHEST

## 2024-11-04 NOTE — ED NOTES
Mode of arrival (squad #, walk in, police, etc) : EMS        Chief complaint(s): Chest pain        Arrival Note (brief scenario, treatment PTA, etc).: Pt states he was mowing the grass about an hour PTA and he started having chest pain and it was in his back shoulder blade to shoulder blade. Pt states he has chronic pancreatitis and has a pain pump as well for this. Pt took 0.4mg of Nitro at home and given 324 ASA by EMS. Pt is AO x4, vitals assessed, care ongoing.

## 2024-11-04 NOTE — PROGRESS NOTES
Pharmacy monitoring of Heparin infusion  Heparin Infusion New Order    Patient on heparin for:  coronary artery disease    Was patient on previous oral anticoagulant/dose?:  nn , Confirmed with RN/Pt/Pts family/Surescripts?: yes    Factor Xa inhibitor/LMWH use within the past 72 hours? NO  If yes, date of last administration:     Recent Labs     11/04/24  1413   HGB 13.7          Heparin to be monitored using anti-Xa for duration of therapy.(aPTT should be used for patients who have received a DOAC in the past 72 hours)?      Have admin instructions been updated to reflect appropriate protocol? YES    Have appropriate labs been ordered for corresponding protocol? YES   *Discontinue anti-Xa lab monitoring if using aPTT protocol    Is the starting rate/last rate adjustment appropriate? yes    Concurrent anticoagulants: none  (Note it is not appropriate to be on most anticoagulants while on a heparin drip)    Review platelets from the past few days.  Any concerns?: No    Please record any appropriate additional notes here:

## 2024-11-04 NOTE — ED NOTES
Report given to RASHAD Dc from [facility].   Report method by phone   The following was reviewed with receiving RN:   Current vital signs:  BP (!) 141/72   Pulse 74   Temp 98.2 °F (36.8 °C) (Oral)   Resp 14   Ht 1.854 m (6' 1\")   Wt 90.3 kg (199 lb)   SpO2 93%   BMI 26.25 kg/m²                MEWS Score: 2     Any medication or safety alerts were reviewed. Any pending diagnostics and notifications were also reviewed, as well as any safety concerns or issues, abnormal labs, abnormal imaging, and abnormal assessment findings. Questions were answered.

## 2024-11-04 NOTE — PROGRESS NOTES
Pharmacy Medication History Note      List of current medications patient is taking is complete.    Source of information: patient, [employer], [place], Care Everywhere     Changes made to medication list:  Medications removed (include reason, ex. therapy complete or physician discontinued, noncompliance):  Metoprolol succinate - taking tartrate formulation    Medications flagged for provider review:  None     Medications added/doses adjusted:  Flonase 1 spray in each nostril daily   The patient is prescribed ezetimibe 10 mg take half a tablet daily however he reports taking a full tablet every other day  Metoprolol tartrate is prescribed as 25 mg twice daily however he reports taking 25 mg daily   Morphine pain pump    Other notes (ex. Recent course of antibiotics, Coumadin dosing):  The patient reports taking a \"pancreas supplement\" with meals, but this is not an active prescription.   Per [place], last pain pump refill was filled on 3/8/24. He reports he is overdue for a refill due to inability to pay.   Medication history completed by Sanket Felix, PharmD Candidate 2025 under my direct supervision.       Current Home Medication List at Time of Admission:  Prior to Admission medications    Medication Sig   MORPHINE, PAIN PUMP REFILL CHARGE,    fluticasone (FLONASE) 50 MCG/ACT nasal spray 1 spray by Each Nostril route daily   clopidogrel (PLAVIX) 75 MG tablet Take 1 tablet by mouth daily   ezetimibe (ZETIA) 10 MG tablet Take 0.5 tablets by mouth daily  Patient taking differently: Take 1 tablet by mouth every other day   atorvastatin (LIPITOR) 80 MG tablet Take 1 tablet by mouth every evening   metoprolol tartrate (LOPRESSOR) 25 MG tablet TAKE 1 TABLET BY MOUTH 2 TIMES A DAY  Patient taking differently: Take 1 tablet by mouth daily   aspirin 81 MG chewable tablet Take 1 tablet by mouth daily   nitroGLYCERIN (NITROSTAT) 0.4 MG SL tablet up to max of 3 total doses. If no relief after 1 dose, call 911.         Please

## 2024-11-04 NOTE — ED PROVIDER NOTES
[facility] ED  EMERGENCY DEPARTMENT ENCOUNTER      Pt Name: Rommel Marie  MRN: [id number]  Birthdate 1960  Date of evaluation: 11/4/24      CHIEF COMPLAINT       Chief Complaint   Patient presents with    Chest Pain         HISTORY OF PRESENT ILLNESS   HPI 64 y.o. male presents with c/o chest pain.  Patient reports that he was cutting the grass in his yard just prior to presentation about an hour ago, he says that he stopped cutting when in the house and then he suddenly developed a severe retrosternal chest pain radiating to his back.  Sharp in character also radiates to the left shoulder.  Constant in duration.  EMS was called they gave him aspirin he had already taken nitroglycerin no relief of symptoms.  Patient reports associated diaphoresis and nausea.  No headache.  No leg pain.  No vomiting.  No cough.  No fevers..     REVIEW OF SYSTEMS       Review of Systems  10 systems reviewed and negative unless otherwise noted in the HPI  PAST MEDICAL HISTORY     Past Medical History:   Diagnosis Date    CAD (coronary artery disease)     Hepatitis C     Hyperlipidemia     Hypertension     L5 spinal cord injury (HCC)     problem with right leg nerve    MI (myocardial infarction) (HCC)     Pancreatitis     Pulmonary embolism (HCC)     Stroke (HCC)     TIA (transient ischemic attack)        SURGICAL HISTORY       Past Surgical History:   Procedure Laterality Date    BACK SURGERY      LIVER BIOPSY         CURRENT MEDICATIONS       Previous Medications    ASPIRIN 81 MG CHEWABLE TABLET    Take 1 tablet by mouth daily    ATORVASTATIN (LIPITOR) 80 MG TABLET    Take 1 tablet by mouth every evening    CLOPIDOGREL (PLAVIX) 75 MG TABLET    Take 1 tablet by mouth daily    EZETIMIBE (ZETIA) 10 MG TABLET    Take 0.5 tablets by mouth daily    FLUTICASONE (FLONASE) 50 MCG/ACT NASAL SPRAY    1 spray by Each Nostril route daily    METOPROLOL TARTRATE (LOPRESSOR) 25 MG TABLET    TAKE 1 TABLET BY MOUTH 2 TIMES A DAY     contrast in the proximal right common iliac artery   suggesting high-grade stenosis/preocclusion with flow demonstrated distally.   4. Mild colonic diverticulosis.   5. COPD.         XR CHEST PORTABLE   Final Result   No acute process.             LABS: Lab orders shown below, the results are reviewed by myself, and all abnormals are listed below.  Labs Reviewed   CBC WITH AUTO DIFFERENTIAL - Abnormal; Notable for the following components:       Result Value    Hematocrit 40.7 (*)     Neutrophils % 72 (*)     Lymphocytes % 22 (*)     All other components within normal limits   COMPREHENSIVE METABOLIC PANEL - Abnormal; Notable for the following components:    Glucose 145 (*)     Total Protein 6.5 (*)     All other components within normal limits   TROPONIN - Abnormal; Notable for the following components:    Troponin, High Sensitivity 41 (*)     All other components within normal limits   TROPONIN   MAGNESIUM   LIPASE       Vitals Reviewed:    Vitals:    11/04/24 1600 11/04/24 1640 11/04/24 1715 11/04/24 1745   BP: (!) 141/74 (!) 151/80 (!) 137/90 (!) 140/86   Pulse: 69 76 74    Resp: 13 11 13    Temp:       TempSrc:       SpO2: 92% 95% 96%    Weight:       Height:         MEDICATIONS GIVEN TO PATIENT THIS ENCOUNTER:  Orders Placed This Encounter   Medications    HYDROmorphone (DILAUDID) injection 1 mg    HYDROmorphone (DILAUDID) injection 1 mg    sodium chloride 0.9 % bolus 100 mL    sodium chloride flush 0.9 % injection 10 mL    iopamidol (ISOVUE-370) 76 % injection 100 mL     DISCHARGE PRESCRIPTIONS:  New Prescriptions    No medications on file     PHYSICIAN CONSULTS ORDERED THIS ENCOUNTER:  IP CONSULT TO INTERNAL MEDICINE  IP CONSULT TO CARDIOLOGY     FINAL IMPRESSION      1. NSTEMI (non-ST elevated myocardial infarction) (HCC)    2. Peripheral arterial disease (HCC)          DISPOSITION/PLAN   DISPOSITION Decision To Admit 11/04/2024 05:33:00 PM           PATIENT REFERRED TO:  No follow-up provider

## 2024-11-05 ENCOUNTER — APPOINTMENT (OUTPATIENT)
Age: 64
DRG: 322 | End: 2024-11-05
Payer: MEDICARE

## 2024-11-05 ENCOUNTER — HOSPITAL ENCOUNTER (OUTPATIENT)
Age: 64
Setting detail: OUTPATIENT SURGERY
Discharge: NURSING, REHAB OR LONG-TERM CARE | DRG: 322 | End: 2024-11-05
Attending: INTERNAL MEDICINE | Admitting: INTERNAL MEDICINE
Payer: MEDICARE

## 2024-11-05 ENCOUNTER — HOSPITAL ENCOUNTER (INPATIENT)
Age: 64
LOS: 1 days | Discharge: HOME | DRG: 322 | End: 2024-11-06
Attending: INTERNAL MEDICINE | Admitting: INTERNAL MEDICINE
Payer: MEDICARE

## 2024-11-05 VITALS
TEMPERATURE: 97.6 F | SYSTOLIC BLOOD PRESSURE: 124 MMHG | HEART RATE: 73 BPM | HEIGHT: 73 IN | DIASTOLIC BLOOD PRESSURE: 76 MMHG | RESPIRATION RATE: 13 BRPM | WEIGHT: 199 LBS | OXYGEN SATURATION: 97 % | BODY MASS INDEX: 26.37 KG/M2

## 2024-11-05 VITALS
SYSTOLIC BLOOD PRESSURE: 105 MMHG | TEMPERATURE: 98.3 F | OXYGEN SATURATION: 100 % | RESPIRATION RATE: 17 BRPM | HEART RATE: 61 BPM | DIASTOLIC BLOOD PRESSURE: 67 MMHG

## 2024-11-05 DIAGNOSIS — I20.9 ANGINA PECTORIS (HCC): ICD-10-CM

## 2024-11-05 LAB
ANION GAP SERPL CALCULATED.3IONS-SCNC: 8 MMOL/L (ref 9–16)
ANTI-XA UNFRAC HEPARIN: 0.73 IU/L (ref 0.3–0.7)
ANTI-XA UNFRAC HEPARIN: 0.73 IU/L (ref 0.3–0.7)
ANTI-XA UNFRAC HEPARIN: <0.1 IU/L (ref 0.3–0.7)
BASOPHILS # BLD: 0.1 K/UL (ref 0–0.2)
BASOPHILS NFR BLD: 2 % (ref 0–2)
BUN SERPL-MCNC: 13 MG/DL (ref 8–23)
CALCIUM SERPL-MCNC: 8.8 MG/DL (ref 8.6–10.4)
CHLORIDE SERPL-SCNC: 107 MMOL/L (ref 98–107)
CO2 SERPL-SCNC: 25 MMOL/L (ref 20–31)
CREAT SERPL-MCNC: 0.8 MG/DL (ref 0.7–1.2)
EOSINOPHIL # BLD: 0.1 K/UL (ref 0–0.4)
EOSINOPHILS RELATIVE PERCENT: 2 % (ref 0–4)
ERYTHROCYTE [DISTWIDTH] IN BLOOD BY AUTOMATED COUNT: 13.7 % (ref 11.5–14.9)
GFR, ESTIMATED: >90 ML/MIN/1.73M2
GLUCOSE BLD-MCNC: 114 MG/DL (ref 75–110)
GLUCOSE SERPL-MCNC: 112 MG/DL (ref 74–99)
HCT VFR BLD AUTO: 38.7 % (ref 41–53)
HGB BLD-MCNC: 13.5 G/DL (ref 13.5–17.5)
LYMPHOCYTES NFR BLD: 2.5 K/UL (ref 1–4.8)
LYMPHOCYTES RELATIVE PERCENT: 35 % (ref 24–44)
MCH RBC QN AUTO: 30.4 PG (ref 26–34)
MCHC RBC AUTO-ENTMCNC: 34.9 G/DL (ref 31–37)
MCV RBC AUTO: 87.1 FL (ref 80–100)
MONOCYTES NFR BLD: 0.8 K/UL (ref 0.1–1.3)
MONOCYTES NFR BLD: 11 % (ref 1–7)
NEUTROPHILS NFR BLD: 50 % (ref 36–66)
NEUTS SEG NFR BLD: 3.7 K/UL (ref 1.3–9.1)
PLATELET # BLD AUTO: 177 K/UL (ref 150–450)
PMV BLD AUTO: 7.6 FL (ref 6–12)
POTASSIUM SERPL-SCNC: 3.9 MMOL/L (ref 3.7–5.3)
RBC # BLD AUTO: 4.45 M/UL (ref 4.5–5.9)
SODIUM SERPL-SCNC: 140 MMOL/L (ref 136–145)
TROPONIN I SERPL HS-MCNC: 241 NG/L (ref 0–22)
WBC OTHER # BLD: 7.2 K/UL (ref 3.5–11)

## 2024-11-05 PROCEDURE — 6370000000 HC RX 637 (ALT 250 FOR IP): Performed by: INTERNAL MEDICINE

## 2024-11-05 PROCEDURE — 2709999900 HC NON-CHARGEABLE SUPPLY: Performed by: INTERNAL MEDICINE

## 2024-11-05 PROCEDURE — C1894 INTRO/SHEATH, NON-LASER: HCPCS | Performed by: INTERNAL MEDICINE

## 2024-11-05 PROCEDURE — C1874 STENT, COATED/COV W/DEL SYS: HCPCS | Performed by: INTERNAL MEDICINE

## 2024-11-05 PROCEDURE — 99223 1ST HOSP IP/OBS HIGH 75: CPT | Performed by: NURSE PRACTITIONER

## 2024-11-05 PROCEDURE — 6360000002 HC RX W HCPCS: Performed by: NURSE PRACTITIONER

## 2024-11-05 PROCEDURE — 99223 1ST HOSP IP/OBS HIGH 75: CPT | Performed by: INTERNAL MEDICINE

## 2024-11-05 PROCEDURE — 2060000000 HC ICU INTERMEDIATE R&B

## 2024-11-05 PROCEDURE — 92928 PRQ TCAT PLMT NTRAC ST 1 LES: CPT | Performed by: INTERNAL MEDICINE

## 2024-11-05 PROCEDURE — 7100000011 HC PHASE II RECOVERY - ADDTL 15 MIN: Performed by: INTERNAL MEDICINE

## 2024-11-05 PROCEDURE — 80048 BASIC METABOLIC PNL TOTAL CA: CPT

## 2024-11-05 PROCEDURE — 36415 COLL VENOUS BLD VENIPUNCTURE: CPT

## 2024-11-05 PROCEDURE — C1887 CATHETER, GUIDING: HCPCS | Performed by: INTERNAL MEDICINE

## 2024-11-05 PROCEDURE — 027034Z DILATION OF CORONARY ARTERY, ONE ARTERY WITH DRUG-ELUTING INTRALUMINAL DEVICE, PERCUTANEOUS APPROACH: ICD-10-PCS | Performed by: INTERNAL MEDICINE

## 2024-11-05 PROCEDURE — 2580000003 HC RX 258: Performed by: INTERNAL MEDICINE

## 2024-11-05 PROCEDURE — 85520 HEPARIN ASSAY: CPT

## 2024-11-05 PROCEDURE — 6360000004 HC RX CONTRAST MEDICATION: Performed by: INTERNAL MEDICINE

## 2024-11-05 PROCEDURE — B2111ZZ FLUOROSCOPY OF MULTIPLE CORONARY ARTERIES USING LOW OSMOLAR CONTRAST: ICD-10-PCS | Performed by: INTERNAL MEDICINE

## 2024-11-05 PROCEDURE — 76937 US GUIDE VASCULAR ACCESS: CPT | Performed by: INTERNAL MEDICINE

## 2024-11-05 PROCEDURE — 85025 COMPLETE CBC W/AUTO DIFF WBC: CPT

## 2024-11-05 PROCEDURE — 6360000002 HC RX W HCPCS: Performed by: INTERNAL MEDICINE

## 2024-11-05 PROCEDURE — 7100000010 HC PHASE II RECOVERY - FIRST 15 MIN: Performed by: INTERNAL MEDICINE

## 2024-11-05 PROCEDURE — 84484 ASSAY OF TROPONIN QUANT: CPT

## 2024-11-05 PROCEDURE — 92978 ENDOLUMINL IVUS OCT C 1ST: CPT | Performed by: INTERNAL MEDICINE

## 2024-11-05 PROCEDURE — 2580000003 HC RX 258: Performed by: NURSE PRACTITIONER

## 2024-11-05 PROCEDURE — 2500000003 HC RX 250 WO HCPCS: Performed by: INTERNAL MEDICINE

## 2024-11-05 PROCEDURE — 82947 ASSAY GLUCOSE BLOOD QUANT: CPT

## 2024-11-05 PROCEDURE — 93458 L HRT ARTERY/VENTRICLE ANGIO: CPT | Performed by: INTERNAL MEDICINE

## 2024-11-05 PROCEDURE — C1769 GUIDE WIRE: HCPCS | Performed by: INTERNAL MEDICINE

## 2024-11-05 PROCEDURE — C1725 CATH, TRANSLUMIN NON-LASER: HCPCS | Performed by: INTERNAL MEDICINE

## 2024-11-05 PROCEDURE — 6A750Z5 ULTRASOUND THERAPY OF HEART, SINGLE: ICD-10-PCS | Performed by: INTERNAL MEDICINE

## 2024-11-05 PROCEDURE — 4A023N7 MEASUREMENT OF CARDIAC SAMPLING AND PRESSURE, LEFT HEART, PERCUTANEOUS APPROACH: ICD-10-PCS | Performed by: INTERNAL MEDICINE

## 2024-11-05 PROCEDURE — B2151ZZ FLUOROSCOPY OF LEFT HEART USING LOW OSMOLAR CONTRAST: ICD-10-PCS | Performed by: INTERNAL MEDICINE

## 2024-11-05 PROCEDURE — 99152 MOD SED SAME PHYS/QHP 5/>YRS: CPT | Performed by: INTERNAL MEDICINE

## 2024-11-05 PROCEDURE — 99153 MOD SED SAME PHYS/QHP EA: CPT | Performed by: INTERNAL MEDICINE

## 2024-11-05 PROCEDURE — C1753 CATH, INTRAVAS ULTRASOUND: HCPCS | Performed by: INTERNAL MEDICINE

## 2024-11-05 PROCEDURE — C9600 PERC DRUG-EL COR STENT SING: HCPCS | Performed by: INTERNAL MEDICINE

## 2024-11-05 DEVICE — STENT ONYXNG30018UX ONYX 3.00X18RX
Type: IMPLANTABLE DEVICE | Status: FUNCTIONAL
Brand: ONYX FRONTIER™

## 2024-11-05 RX ORDER — HEPARIN SODIUM 1000 [USP'U]/ML
2000 INJECTION, SOLUTION INTRAVENOUS; SUBCUTANEOUS PRN
Status: CANCELLED | OUTPATIENT
Start: 2024-11-05

## 2024-11-05 RX ORDER — ASPIRIN 81 MG/1
81 TABLET, CHEWABLE ORAL DAILY
Status: CANCELLED | OUTPATIENT
Start: 2024-11-06

## 2024-11-05 RX ORDER — HEPARIN SODIUM 1000 [USP'U]/ML
4000 INJECTION, SOLUTION INTRAVENOUS; SUBCUTANEOUS PRN
Status: CANCELLED | OUTPATIENT
Start: 2024-11-05

## 2024-11-05 RX ORDER — ATORVASTATIN CALCIUM 80 MG/1
80 TABLET, FILM COATED ORAL EVERY EVENING
Status: CANCELLED | OUTPATIENT
Start: 2024-11-05

## 2024-11-05 RX ORDER — ONDANSETRON 4 MG/1
4 TABLET, ORALLY DISINTEGRATING ORAL EVERY 8 HOURS PRN
Status: DISCONTINUED | OUTPATIENT
Start: 2024-11-05 | End: 2024-11-06 | Stop reason: HOSPADM

## 2024-11-05 RX ORDER — HEPARIN SODIUM 1000 [USP'U]/ML
4000 INJECTION, SOLUTION INTRAVENOUS; SUBCUTANEOUS PRN
Status: DISCONTINUED | OUTPATIENT
Start: 2024-11-05 | End: 2024-11-05

## 2024-11-05 RX ORDER — POTASSIUM CHLORIDE 1500 MG/1
40 TABLET, EXTENDED RELEASE ORAL PRN
Status: DISCONTINUED | OUTPATIENT
Start: 2024-11-05 | End: 2024-11-06 | Stop reason: HOSPADM

## 2024-11-05 RX ORDER — NITROGLYCERIN 0.4 MG/1
0.4 TABLET SUBLINGUAL EVERY 5 MIN PRN
Status: DISCONTINUED | OUTPATIENT
Start: 2024-11-05 | End: 2024-11-06 | Stop reason: HOSPADM

## 2024-11-05 RX ORDER — POLYETHYLENE GLYCOL 3350 17 G/17G
17 POWDER, FOR SOLUTION ORAL DAILY PRN
Status: CANCELLED | OUTPATIENT
Start: 2024-11-05

## 2024-11-05 RX ORDER — MAGNESIUM SULFATE HEPTAHYDRATE 40 MG/ML
2000 INJECTION, SOLUTION INTRAVENOUS PRN
Status: DISCONTINUED | OUTPATIENT
Start: 2024-11-05 | End: 2024-11-06 | Stop reason: HOSPADM

## 2024-11-05 RX ORDER — ACETAMINOPHEN 650 MG/1
650 SUPPOSITORY RECTAL EVERY 6 HOURS PRN
Status: DISCONTINUED | OUTPATIENT
Start: 2024-11-05 | End: 2024-11-06 | Stop reason: HOSPADM

## 2024-11-05 RX ORDER — ACETAMINOPHEN 325 MG/1
650 TABLET ORAL EVERY 6 HOURS PRN
Status: DISCONTINUED | OUTPATIENT
Start: 2024-11-05 | End: 2024-11-06 | Stop reason: HOSPADM

## 2024-11-05 RX ORDER — ACETAMINOPHEN 650 MG/1
650 SUPPOSITORY RECTAL EVERY 6 HOURS PRN
Status: CANCELLED | OUTPATIENT
Start: 2024-11-05

## 2024-11-05 RX ORDER — NITROGLYCERIN 20 MG/100ML
5-200 INJECTION INTRAVENOUS CONTINUOUS
Status: CANCELLED | OUTPATIENT
Start: 2024-11-05

## 2024-11-05 RX ORDER — HEPARIN SODIUM 10000 [USP'U]/100ML
5-30 INJECTION, SOLUTION INTRAVENOUS CONTINUOUS
Status: CANCELLED | OUTPATIENT
Start: 2024-11-05

## 2024-11-05 RX ORDER — NITROGLYCERIN 20 MG/100ML
5-200 INJECTION INTRAVENOUS CONTINUOUS
Status: DISCONTINUED | OUTPATIENT
Start: 2024-11-05 | End: 2024-11-05 | Stop reason: HOSPADM

## 2024-11-05 RX ORDER — SODIUM CHLORIDE 9 MG/ML
INJECTION, SOLUTION INTRAVENOUS PRN
Status: CANCELLED | OUTPATIENT
Start: 2024-11-05

## 2024-11-05 RX ORDER — POLYETHYLENE GLYCOL 3350 17 G/17G
17 POWDER, FOR SOLUTION ORAL DAILY PRN
Status: DISCONTINUED | OUTPATIENT
Start: 2024-11-05 | End: 2024-11-06 | Stop reason: HOSPADM

## 2024-11-05 RX ORDER — BISACODYL 10 MG
10 SUPPOSITORY, RECTAL RECTAL DAILY PRN
Status: CANCELLED | OUTPATIENT
Start: 2024-11-05

## 2024-11-05 RX ORDER — LANOLIN ALCOHOL/MO/W.PET/CERES
3 CREAM (GRAM) TOPICAL NIGHTLY PRN
Status: DISCONTINUED | OUTPATIENT
Start: 2024-11-05 | End: 2024-11-06 | Stop reason: HOSPADM

## 2024-11-05 RX ORDER — SODIUM CHLORIDE 0.9 % (FLUSH) 0.9 %
5-40 SYRINGE (ML) INJECTION EVERY 12 HOURS SCHEDULED
Status: CANCELLED | OUTPATIENT
Start: 2024-11-05

## 2024-11-05 RX ORDER — SODIUM CHLORIDE 0.9 % (FLUSH) 0.9 %
10 SYRINGE (ML) INJECTION PRN
Status: CANCELLED | OUTPATIENT
Start: 2024-11-05

## 2024-11-05 RX ORDER — ONDANSETRON 2 MG/ML
4 INJECTION INTRAMUSCULAR; INTRAVENOUS EVERY 6 HOURS PRN
Status: DISCONTINUED | OUTPATIENT
Start: 2024-11-05 | End: 2024-11-06 | Stop reason: HOSPADM

## 2024-11-05 RX ORDER — POTASSIUM CHLORIDE 1500 MG/1
40 TABLET, EXTENDED RELEASE ORAL PRN
Status: CANCELLED | OUTPATIENT
Start: 2024-11-05

## 2024-11-05 RX ORDER — SODIUM CHLORIDE 0.9 % (FLUSH) 0.9 %
10 SYRINGE (ML) INJECTION PRN
Status: DISCONTINUED | OUTPATIENT
Start: 2024-11-05 | End: 2024-11-06 | Stop reason: HOSPADM

## 2024-11-05 RX ORDER — METOPROLOL TARTRATE 25 MG/1
25 TABLET, FILM COATED ORAL DAILY
Status: CANCELLED | OUTPATIENT
Start: 2024-11-06

## 2024-11-05 RX ORDER — ASPIRIN 325 MG
TABLET ORAL PRN
Status: DISCONTINUED | OUTPATIENT
Start: 2024-11-05 | End: 2024-11-05 | Stop reason: HOSPADM

## 2024-11-05 RX ORDER — ASPIRIN 81 MG/1
81 TABLET, CHEWABLE ORAL DAILY
Status: DISCONTINUED | OUTPATIENT
Start: 2024-11-06 | End: 2024-11-06 | Stop reason: HOSPADM

## 2024-11-05 RX ORDER — ONDANSETRON 2 MG/ML
4 INJECTION INTRAMUSCULAR; INTRAVENOUS EVERY 6 HOURS PRN
Status: CANCELLED | OUTPATIENT
Start: 2024-11-05

## 2024-11-05 RX ORDER — HEPARIN SODIUM 10000 [USP'U]/100ML
5-30 INJECTION, SOLUTION INTRAVENOUS CONTINUOUS
Status: DISCONTINUED | OUTPATIENT
Start: 2024-11-05 | End: 2024-11-05

## 2024-11-05 RX ORDER — HEPARIN SODIUM 1000 [USP'U]/ML
2000 INJECTION, SOLUTION INTRAVENOUS; SUBCUTANEOUS PRN
Status: DISCONTINUED | OUTPATIENT
Start: 2024-11-05 | End: 2024-11-05

## 2024-11-05 RX ORDER — EZETIMIBE 10 MG/1
10 TABLET ORAL EVERY OTHER DAY
Status: DISCONTINUED | OUTPATIENT
Start: 2024-11-06 | End: 2024-11-06 | Stop reason: HOSPADM

## 2024-11-05 RX ORDER — SODIUM CHLORIDE 9 MG/ML
INJECTION, SOLUTION INTRAVENOUS CONTINUOUS
Status: DISCONTINUED | OUTPATIENT
Start: 2024-11-05 | End: 2024-11-05 | Stop reason: HOSPADM

## 2024-11-05 RX ORDER — EZETIMIBE 10 MG/1
10 TABLET ORAL EVERY OTHER DAY
Status: CANCELLED | OUTPATIENT
Start: 2024-11-06

## 2024-11-05 RX ORDER — BISACODYL 10 MG
10 SUPPOSITORY, RECTAL RECTAL DAILY PRN
Status: DISCONTINUED | OUTPATIENT
Start: 2024-11-05 | End: 2024-11-06 | Stop reason: HOSPADM

## 2024-11-05 RX ORDER — MAGNESIUM SULFATE HEPTAHYDRATE 40 MG/ML
2000 INJECTION, SOLUTION INTRAVENOUS PRN
Status: CANCELLED | OUTPATIENT
Start: 2024-11-05

## 2024-11-05 RX ORDER — FENTANYL CITRATE 50 UG/ML
INJECTION, SOLUTION INTRAMUSCULAR; INTRAVENOUS PRN
Status: DISCONTINUED | OUTPATIENT
Start: 2024-11-05 | End: 2024-11-05 | Stop reason: HOSPADM

## 2024-11-05 RX ORDER — ATORVASTATIN CALCIUM 80 MG/1
80 TABLET, FILM COATED ORAL EVERY EVENING
Status: DISCONTINUED | OUTPATIENT
Start: 2024-11-05 | End: 2024-11-06 | Stop reason: HOSPADM

## 2024-11-05 RX ORDER — HEPARIN SODIUM 1000 [USP'U]/ML
INJECTION, SOLUTION INTRAVENOUS; SUBCUTANEOUS PRN
Status: DISCONTINUED | OUTPATIENT
Start: 2024-11-05 | End: 2024-11-05 | Stop reason: HOSPADM

## 2024-11-05 RX ORDER — SODIUM CHLORIDE 0.9 % (FLUSH) 0.9 %
5-40 SYRINGE (ML) INJECTION EVERY 12 HOURS SCHEDULED
Status: DISCONTINUED | OUTPATIENT
Start: 2024-11-05 | End: 2024-11-06 | Stop reason: HOSPADM

## 2024-11-05 RX ORDER — SODIUM CHLORIDE 9 MG/ML
INJECTION, SOLUTION INTRAVENOUS PRN
Status: DISCONTINUED | OUTPATIENT
Start: 2024-11-05 | End: 2024-11-06 | Stop reason: HOSPADM

## 2024-11-05 RX ORDER — NITROGLYCERIN 0.4 MG/1
0.4 TABLET SUBLINGUAL EVERY 5 MIN PRN
Status: CANCELLED | OUTPATIENT
Start: 2024-11-05

## 2024-11-05 RX ORDER — METOPROLOL TARTRATE 25 MG/1
25 TABLET, FILM COATED ORAL DAILY
Status: DISCONTINUED | OUTPATIENT
Start: 2024-11-06 | End: 2024-11-06 | Stop reason: HOSPADM

## 2024-11-05 RX ORDER — MIDAZOLAM HYDROCHLORIDE 1 MG/ML
INJECTION, SOLUTION INTRAMUSCULAR; INTRAVENOUS PRN
Status: DISCONTINUED | OUTPATIENT
Start: 2024-11-05 | End: 2024-11-05 | Stop reason: HOSPADM

## 2024-11-05 RX ORDER — CLOPIDOGREL BISULFATE 75 MG/1
75 TABLET ORAL DAILY
Status: CANCELLED | OUTPATIENT
Start: 2024-11-06

## 2024-11-05 RX ORDER — LANOLIN ALCOHOL/MO/W.PET/CERES
3 CREAM (GRAM) TOPICAL NIGHTLY PRN
Status: CANCELLED | OUTPATIENT
Start: 2024-11-05

## 2024-11-05 RX ORDER — IOPAMIDOL 755 MG/ML
INJECTION, SOLUTION INTRAVASCULAR PRN
Status: DISCONTINUED | OUTPATIENT
Start: 2024-11-05 | End: 2024-11-05 | Stop reason: HOSPADM

## 2024-11-05 RX ORDER — CLOPIDOGREL BISULFATE 75 MG/1
75 TABLET ORAL DAILY
Status: DISCONTINUED | OUTPATIENT
Start: 2024-11-06 | End: 2024-11-05

## 2024-11-05 RX ORDER — BIVALIRUDIN 250 MG/5ML
INJECTION, POWDER, LYOPHILIZED, FOR SOLUTION INTRAVENOUS PRN
Status: DISCONTINUED | OUTPATIENT
Start: 2024-11-05 | End: 2024-11-05 | Stop reason: HOSPADM

## 2024-11-05 RX ORDER — NITROGLYCERIN 20 MG/100ML
5-200 INJECTION INTRAVENOUS CONTINUOUS
Status: DISCONTINUED | OUTPATIENT
Start: 2024-11-05 | End: 2024-11-05

## 2024-11-05 RX ORDER — ACETAMINOPHEN 325 MG/1
650 TABLET ORAL EVERY 6 HOURS PRN
Status: CANCELLED | OUTPATIENT
Start: 2024-11-05

## 2024-11-05 RX ORDER — NITROGLYCERIN 20 MG/100ML
INJECTION INTRAVENOUS PRN
Status: DISCONTINUED | OUTPATIENT
Start: 2024-11-05 | End: 2024-11-05 | Stop reason: HOSPADM

## 2024-11-05 RX ORDER — ONDANSETRON 4 MG/1
4 TABLET, ORALLY DISINTEGRATING ORAL EVERY 8 HOURS PRN
Status: CANCELLED | OUTPATIENT
Start: 2024-11-05

## 2024-11-05 RX ADMIN — HYDROMORPHONE HYDROCHLORIDE 1 MG: 1 INJECTION, SOLUTION INTRAMUSCULAR; INTRAVENOUS; SUBCUTANEOUS at 18:31

## 2024-11-05 RX ADMIN — SODIUM CHLORIDE, PRESERVATIVE FREE 10 ML: 5 INJECTION INTRAVENOUS at 09:23

## 2024-11-05 RX ADMIN — NITROGLYCERIN 5 MCG/MIN: 20 INJECTION INTRAVENOUS at 10:46

## 2024-11-05 RX ADMIN — TICAGRELOR 90 MG: 90 TABLET ORAL at 22:05

## 2024-11-05 RX ADMIN — ATORVASTATIN CALCIUM 80 MG: 80 TABLET, FILM COATED ORAL at 18:28

## 2024-11-05 RX ADMIN — HYDROMORPHONE HYDROCHLORIDE 1 MG: 1 INJECTION, SOLUTION INTRAMUSCULAR; INTRAVENOUS; SUBCUTANEOUS at 02:12

## 2024-11-05 RX ADMIN — HYDROMORPHONE HYDROCHLORIDE 1 MG: 1 INJECTION, SOLUTION INTRAMUSCULAR; INTRAVENOUS; SUBCUTANEOUS at 07:48

## 2024-11-05 RX ADMIN — SODIUM CHLORIDE, PRESERVATIVE FREE 10 ML: 5 INJECTION INTRAVENOUS at 22:06

## 2024-11-05 RX ADMIN — SODIUM CHLORIDE, PRESERVATIVE FREE 10 ML: 5 INJECTION INTRAVENOUS at 07:48

## 2024-11-05 ASSESSMENT — PAIN SCALES - GENERAL
PAINLEVEL_OUTOF10: 10
PAINLEVEL_OUTOF10: 9
PAINLEVEL_OUTOF10: 9
PAINLEVEL_OUTOF10: 7
PAINLEVEL_OUTOF10: 5
PAINLEVEL_OUTOF10: 8
PAINLEVEL_OUTOF10: 8
PAINLEVEL_OUTOF10: 7
PAINLEVEL_OUTOF10: 10
PAINLEVEL_OUTOF10: 9
PAINLEVEL_OUTOF10: 8

## 2024-11-05 ASSESSMENT — PAIN DESCRIPTION - LOCATION
LOCATION: BACK;OTHER (COMMENT)
LOCATION: CHEST
LOCATION: BACK
LOCATION: CHEST;BACK;NECK
LOCATION: BACK

## 2024-11-05 ASSESSMENT — PAIN DESCRIPTION - DESCRIPTORS
DESCRIPTORS: SHARP;STABBING
DESCRIPTORS: BURNING
DESCRIPTORS: CRAMPING;BURNING

## 2024-11-05 ASSESSMENT — PAIN - FUNCTIONAL ASSESSMENT
PAIN_FUNCTIONAL_ASSESSMENT: INTOLERABLE, UNABLE TO DO ANY ACTIVE OR PASSIVE ACTIVITIES
PAIN_FUNCTIONAL_ASSESSMENT: ACTIVITIES ARE NOT PREVENTED

## 2024-11-05 ASSESSMENT — PAIN DESCRIPTION - ORIENTATION
ORIENTATION: LEFT
ORIENTATION: LEFT

## 2024-11-05 ASSESSMENT — PAIN SCALES - WONG BAKER: WONGBAKER_NUMERICALRESPONSE: NO HURT

## 2024-11-05 NOTE — CONSULTS
ischemia/infarct, EF 80%    Assessment/Plan:  1. CAD-status post PCI. Currently stable without angina.  -I reviewed his nuclear stress test which was low risk on 07/22.  -continue aspirin, statin, Plavix, beta-blocker  -has very good functional capacity     2. Hypertension. Controlled.     3. Dyslipidemia. Currently on a statin and Zetia.  -Last LDL at goal  -needs new labs- script provided     4. Dizziness.  Resolved after quitting smoking.    Past Medical History:   has a past medical history of CAD (coronary artery disease), Hepatitis C, Hyperlipidemia, Hypertension, L5 spinal cord injury (HCC), MI (myocardial infarction) (HCC), Pancreatitis, Pulmonary embolism (HCC), Stroke (HCC), and TIA (transient ischemic attack).    Past Surgical History:   has a past surgical history that includes liver biopsy and back surgery.     Home Medications:    Prior to Admission medications    Medication Sig Start Date End Date Taking? Authorizing Provider   MORPHINE, PAIN PUMP REFILL CHARGE,    Yes ProviderKevin MD   fluticasone (FLONASE) 50 MCG/ACT nasal spray 1 spray by Each Nostril route daily   Yes ProviderKevin MD   clopidogrel (PLAVIX) 75 MG tablet Take 1 tablet by mouth daily 10/28/24  Yes Servando Paris DO   ezetimibe (ZETIA) 10 MG tablet Take 0.5 tablets by mouth daily  Patient taking differently: Take 1 tablet by mouth every other day 9/12/24  Yes Servando Paris DO   atorvastatin (LIPITOR) 80 MG tablet Take 1 tablet by mouth every evening 9/12/24  Yes Servando Paris DO   metoprolol tartrate (LOPRESSOR) 25 MG tablet TAKE 1 TABLET BY MOUTH 2 TIMES A DAY  Patient taking differently: Take 1 tablet by mouth daily 7/30/24  Yes Servando Paris DO   aspirin 81 MG chewable tablet Take 1 tablet by mouth daily 11/7/20  Yes Reyes Reynoso DO   nitroGLYCERIN (NITROSTAT) 0.4 MG SL tablet up to max of 3 total doses. If no relief after 1 dose, call 911. 11/6/20  Yes Reyes Reynoso DO       Allergies:  Ativan  auscultation bilaterally  Cardiovascular:  Heart tones are crisp and normal. regular S1 and S2.  Jugular venous pulsation Normal  The carotid upstroke is normal in amplitude and contour without delay or bruit   Abdomen:   soft  Bowel sounds present  Extremities:   No edema  Neurological:  Alert and oriented.      DATA:    Diagnostics:    EKG: normal sinus rhythm, unchanged from previous tracings.  ECHO: previously taken 2022 and show below.   Ejection fraction: 55%  Stress Test: previously taken 7/2022 and show below.  Cardiac Angiography: previously taken 2020 and show below.    Echo 6/2022  Summary  Normal left ventricle size and function with an estimated EF >55%.  No segmental wall motion abnormalities seen.  Mild left ventricular hypertrophy.  Normal right ventricular size and function.  Left atrium is normal in size. Right atrium is normal in size.  Unsuccessful bubble study attempt due to lost IV access.  Normal aortic valve structure and function without stenosis or  regurgitation.  Normal aortic root dimension.  Normal mitral valve structure and function. Trivial mitral regurgitation.  Normal tricuspid valve structure and function. Insignificant tricuspid  regurgitation, unable to estimate RVSP.  The pulmonic valve is normal in structure. Trivial pulmonic insufficiency.  IVC not well visualized.  No significant pericardial effusion is seen.    Stress Test 7/2022  IMPRESSION:     Reversible inferior wall perfusion defect resolves on prone imaging.     No stress-induced ischemia.     No infarct.     Normal LVEF.     Risk stratification: Low    Cardiac Cath 11/2020  Procedure Summary      Single vessel coronary artery disease.   Successful PCI / Drug Eluting Stent of the ostial LAD   Normal LCX and RCA.   Normal LV systolic function.      Recommendations      Routine Post Stent Orders.   Medical therapy as ne      Labs:     CBC:   Recent Labs     11/04/24  1413 11/05/24  0602   WBC 7.1 7.2   HGB 13.7 13.5

## 2024-11-05 NOTE — ACP (ADVANCE CARE PLANNING)
Advance Care Planning     Advance Care Planning Activator (Inpatient)  Conversation Note      Date of ACP Conversation: 11/5/2024     Conversation Conducted with: Patient with Decision Making Capacity    ACP Activator: Sue Dennis RN        Health Care Decision Maker:     Current Designated Health Care Decision Maker:     Primary Decision Maker: Yoly Orozco - Child - [phone]        Care Preferences    Ventilation:  \"If you were in your present state of health and suddenly became very ill and were unable to breathe on your own, what would your preference be about the use of a ventilator (breathing machine) if it were available to you?\"      Would the patient desire the use of ventilator (breathing machine)?: yes    \"If your health worsens and it becomes clear that your chance of recovery is unlikely, what would your preference be about the use of a ventilator (breathing machine) if it were available to you?\"     Would the patient desire the use of ventilator (breathing machine)?: \" I Don't Know\"      Resuscitation  \"CPR works best to restart the heart when there is a sudden event, like a heart attack, in someone who is otherwise healthy. Unfortunately, CPR does not typically restart the heart for people who have serious health conditions or who are very sick.\"    \"In the event your heart stopped as a result of an underlying serious health condition, would you want attempts to be made to restart your heart (answer \"yes\" for attempt to resuscitate) or would you prefer a natural death (answer \"no\" for do not attempt to resuscitate)?\" yes       [] Yes   [] No   Educated Patient / Decision Maker regarding differences between Advance Directives and portable DNR orders.    Length of ACP Conversation in minutes:      Conversation Outcomes:  ACP discussion completed    Follow-up plan:    [] Schedule follow-up conversation to continue planning  [] Referred individual to Provider for additional

## 2024-11-05 NOTE — PROGRESS NOTES
Patient admitted, consent signed and questions answered. Patient ready for procedure. Call light to reach with side rails up 2 of 2. B/L Groin clipped with writer and Amber SOLORZANO present.  No family at bedside with patient.  History and physical complete.

## 2024-11-05 NOTE — PROGRESS NOTES
Received post cardiac cath procedure to PCC room 14. Assessment obtained. Restrictions reviewed with patient. Post procedure pathway initiated.  Rt. radial site soft , under device old blood noted.  No hematoma noted.   Patient without complaints.

## 2024-11-05 NOTE — PROGRESS NOTES
Pharmacy monitoring of Heparin infusion  Heparin Infusion New Order    Patient on heparin for:  coronary artery disease    Was patient on previous oral anticoagulant/dose?:  no , Confirmed with RN/Pt/Pts family/Surescripts?: yes    Factor Xa inhibitor/LMWH use within the past 72 hours? NO  If yes, date of last administration: n/a    Recent Labs     11/04/24  1413 11/05/24  0602   HGB 13.7 13.5    177       Heparin to be monitored using anti-Xa for duration of therapy.(aPTT should be used for patients who have received a DOAC in the past 72 hours)?      Have admin instructions been updated to reflect appropriate protocol? YES    Have appropriate labs been ordered for corresponding protocol? YES   *Discontinue anti-Xa lab monitoring if using aPTT protocol    Is the starting rate/last rate adjustment appropriate? yes    Concurrent anticoagulants: none  (Note it is not appropriate to be on most anticoagulants while on a heparin drip)    Review platelets from the past few days.  Any concerns?: No    Please record any appropriate additional notes here:

## 2024-11-05 NOTE — CARE COORDINATION
Case Management Assessment  Initial Evaluation    Date/Time of Evaluation: 11/5/2024 9:46 AM  Assessment Completed by: Sue Dennis RN    If patient is discharged prior to next notation, then this note serves as note for discharge by case management.    Patient Name: Rommel Marie                   YOB: 1960  Diagnosis: Peripheral arterial disease (HCC) [I73.9]  NSTEMI (non-ST elevated myocardial infarction) (HCC) [I21.4]                   Date / Time: 11/4/2024  1:24 PM    Patient Admission Status: Inpatient   Readmission Risk (Low < 19, Mod (19-27), High > 27): Readmission Risk Score: 8.2    Current PCP: No primary care provider on file.  PCP verified by CM? No (Pt has NO PCP, Denies Assist to find one. Information, for [facility] placed on AVS, for pt. to follow.)    Chart Reviewed: Yes      History Provided by: Patient  Patient Orientation: Alert and Oriented    Patient Cognition: Alert    Hospitalization in the last 30 days (Readmission):  No    If yes, Readmission Assessment in CM Navigator will be completed.    Advance Directives:      Code Status: Full Code   Patient's Primary Decision Maker is: Legal Next of Kin    Primary Decision Maker: Yoly Orozco - Child - [phone]    Discharge Planning:    Patient lives with: Children, Family Members Type of Home: House  Primary Care Giver: Self  Patient Support Systems include: Children   Current Financial resources: Medicare  Current community resources: None  Current services prior to admission: None            Current DME:              Type of Home Care services:  None    ADLS  Prior functional level: Independent in ADLs/IADLs  Current functional level: Independent in ADLs/IADLs    PT AM-PAC:   /24  OT AM-PAC:   /24    Family can provide assistance at DC: Yes  Would you like Case Management to discuss the discharge plan with any other family members/significant others, and if so, who?    Plans to Return to Present Housing:

## 2024-11-05 NOTE — PROGRESS NOTES
Pt. Dropped off via [facility].  Nitro and hep gtt. Off when writer to room.  Nitro gtt. And heparin gtt. Restarted at previous rate with new tubing.  Pt. With only 1 IV, unable to find another IV site, will consult PICC team.

## 2024-11-05 NOTE — PLAN OF CARE
Problem: Chronic Conditions and Co-morbidities  Goal: Patient's chronic conditions and co-morbidity symptoms are monitored and maintained or improved  Outcome: Progressing     Problem: Discharge Planning  Goal: Discharge to home or other facility with appropriate resources  Outcome: Progressing     Problem: Pain  Goal: Verbalizes/displays adequate comfort level or baseline comfort level  Outcome: Progressing.   Patient given pain medication as ordered.     Problem: Safety - Adult  Goal: Free from fall injury  Outcome: Progressing

## 2024-11-05 NOTE — H&P
mg/dL   Lipase    Collection Time: 11/04/24  2:13 PM   Result Value Ref Range    Lipase 14 13 - 60 U/L   Troponin    Collection Time: 11/04/24  3:06 PM   Result Value Ref Range    Troponin, High Sensitivity 41 (H) 0 - 22 ng/L   Anti-Xa, Unfractionated Heparin    Collection Time: 11/05/24  1:22 AM   Result Value Ref Range    Anti-XA Unfrac Heparin 0.73 (HH) 0.30 - 0.70 IU/L   POC Glucose Fingerstick    Collection Time: 11/05/24  6:01 AM   Result Value Ref Range    POC Glucose 114 (H) 75 - 110 mg/dL   Basic Metabolic Panel w/ Reflex to MG    Collection Time: 11/05/24  6:02 AM   Result Value Ref Range    Sodium 140 136 - 145 mmol/L    Potassium 3.9 3.7 - 5.3 mmol/L    Chloride 107 98 - 107 mmol/L    CO2 25 20 - 31 mmol/L    Anion Gap 8 (L) 9 - 16 mmol/L    Glucose 112 (H) 74 - 99 mg/dL    BUN 13 8 - 23 mg/dL    Creatinine 0.8 0.7 - 1.2 mg/dL    Est, Glom Filt Rate >90 >60 mL/min/1.73m2    Calcium 8.8 8.6 - 10.4 mg/dL   CBC with auto differential    Collection Time: 11/05/24  6:02 AM   Result Value Ref Range    WBC 7.2 3.5 - 11.0 k/uL    RBC 4.45 (L) 4.5 - 5.9 m/uL    Hemoglobin 13.5 13.5 - 17.5 g/dL    Hematocrit 38.7 (L) 41 - 53 %    MCV 87.1 80 - 100 fL    MCH 30.4 26 - 34 pg    MCHC 34.9 31 - 37 g/dL    RDW 13.7 11.5 - 14.9 %    Platelets 177 150 - 450 k/uL    MPV 7.6 6.0 - 12.0 fL    Neutrophils % 50 36 - 66 %    Lymphocytes % 35 24 - 44 %    Monocytes % 11 (H) 1 - 7 %    Eosinophils % 2 0 - 4 %    Basophils % 2 0 - 2 %    Neutrophils Absolute 3.70 1.3 - 9.1 k/uL    Lymphocytes Absolute 2.50 1.0 - 4.8 k/uL    Monocytes Absolute 0.80 0.1 - 1.3 k/uL    Eosinophils Absolute 0.10 0.0 - 0.4 k/uL    Basophils Absolute 0.10 0.0 - 0.2 k/uL   Troponin    Collection Time: 11/05/24  6:02 AM   Result Value Ref Range    Troponin, High Sensitivity 241 (HH) 0 - 22 ng/L   Anti-Xa, Unfractionated Heparin    Collection Time: 11/05/24  9:28 AM   Result Value Ref Range    Anti-XA Unfrac Heparin 0.73 (HH) 0.30 - 0.70 IU/L  was generated using voice recognition Dragon dictation software.  Although every effort was made to ensure the accuracy of this automated transcription, some errors in transcription may have occurred.

## 2024-11-05 NOTE — H&P
[facility]  Procedure History and Physical Update          Patient Name: Rommel Marie  MRN:    [id number]  YOB: 1960  Date of evaluation:  11/5/2024    Procedure:    LHC +/- PCI    Indication for procedure:  NSTEMI      Please refer to the note completed by NIMO Sierra on 11/5/2024 in the medical record and note that:    [x] I have examined the patient and reviewed the H&P/Consult and there are no changes to be made to the assessment or plan.    [] I have examined the patient and reviewed the H&P/Consult and have noted the following changes:    Past Medical History:   Diagnosis Date    CAD (coronary artery disease)     Hepatitis C     Hyperlipidemia     Hypertension     L5 spinal cord injury (HCC)     problem with right leg nerve    MI (myocardial infarction) (HCC)     Pancreatitis     Pulmonary embolism (HCC)     Stroke (HCC)     TIA (transient ischemic attack)        Past Surgical History:   Procedure Laterality Date    BACK SURGERY      LIVER BIOPSY         Family History   Problem Relation Age of Onset    Cancer Mother         non-hodgkins lymphoma    Heart Disease Father     Cancer Father         non-hodgkins lymphoma    Cancer Sister         non-hodgkins lymphoma    Cancer Maternal Grandmother         non-hodgkins lymphoma    Cancer Maternal Grandfather         non-hodgkins lymphoma    Cancer Paternal Grandmother         non-hodgkins lymphoma    Cancer Paternal Grandfather         non-hodgkins lymphoma       Allergies   Allergen Reactions    Ativan [Lorazepam] Other (See Comments)     agitation       Prior to Admission medications    Medication Sig Start Date End Date Taking? Authorizing Provider   MORPHINE, PAIN PUMP REFILL CHARGE,     ProviderKevin MD   fluticasone (FLONASE) 50 MCG/ACT nasal spray 1 spray by Each Nostril route daily    Provider, MD Kevin   clopidogrel (PLAVIX) 75 MG tablet Take 1 tablet by mouth daily 10/28/24   Servando Paris DO   ezetimibe  (ZETIA) 10 MG tablet Take 0.5 tablets by mouth daily  Patient taking differently: Take 1 tablet by mouth every other day 9/12/24   Servando Paris DO   atorvastatin (LIPITOR) 80 MG tablet Take 1 tablet by mouth every evening 9/12/24   Servando Paris DO   metoprolol tartrate (LOPRESSOR) 25 MG tablet TAKE 1 TABLET BY MOUTH 2 TIMES A DAY  Patient taking differently: Take 1 tablet by mouth daily 7/30/24   Servando Paris DO   aspirin 81 MG chewable tablet Take 1 tablet by mouth daily 11/7/20   Reyes Reynoso DO   nitroGLYCERIN (NITROSTAT) 0.4 MG SL tablet up to max of 3 total doses. If no relief after 1 dose, call 911. 11/6/20   Reyes Reynoso DO         There were no vitals filed for this visit.    Constitutional and General Appearance:   alert, cooperative, no distress and appears stated age  HEENT:  PERRL, EOMI  Respiratory:  Normal excursion and expansion without use of accessory muscles  Resp Auscultation:  Good respiratory effort. No for increased work of breathing.On auscultation: clear to auscultation bilaterally  Cardiovascular:  Regular rate and rhythm.  S1/S2  No murmurs.  The apical impulse is not displaced  Abdomen:  Soft  Bowel sounds present  Non-tender to palpation  Extremities:  No cyanosis or clubbing  Lower extremity edema: No.  Skin:  Warm and dry  Neurological:  Alert and oriented.  Moves all extremities well      DATA:    Diagnostics:    EKG: normal sinus rhythm, unchanged from previous tracings.  ECHO: previously taken 2022 and show below.   Ejection fraction: 55%  Stress Test: previously taken 7/2022 and show below.  Cardiac Angiography: previously taken 2020 and show below.     Echo 6/2022  Summary  Normal left ventricle size and function with an estimated EF >55%.  No segmental wall motion abnormalities seen.  Mild left ventricular hypertrophy.  Normal right ventricular size and function.  Left atrium is normal in size. Right atrium is normal in size.  Unsuccessful bubble study attempt due to

## 2024-11-06 VITALS
BODY MASS INDEX: 26.47 KG/M2 | OXYGEN SATURATION: 96 % | TEMPERATURE: 98.1 F | DIASTOLIC BLOOD PRESSURE: 60 MMHG | RESPIRATION RATE: 17 BRPM | HEART RATE: 71 BPM | WEIGHT: 200.62 LBS | SYSTOLIC BLOOD PRESSURE: 110 MMHG

## 2024-11-06 LAB
ANION GAP SERPL CALCULATED.3IONS-SCNC: 12 MMOL/L (ref 9–16)
ANTI-XA UNFRAC HEPARIN: <0.1 IU/L (ref 0.3–0.7)
BASOPHILS # BLD: 0.1 K/UL (ref 0–0.2)
BASOPHILS NFR BLD: 1 % (ref 0–2)
BUN SERPL-MCNC: 13 MG/DL (ref 8–23)
CALCIUM SERPL-MCNC: 9.1 MG/DL (ref 8.6–10.4)
CHLORIDE SERPL-SCNC: 107 MMOL/L (ref 98–107)
CO2 SERPL-SCNC: 19 MMOL/L (ref 20–31)
CREAT SERPL-MCNC: 0.8 MG/DL (ref 0.7–1.2)
EOSINOPHIL # BLD: 0.1 K/UL (ref 0–0.4)
EOSINOPHILS RELATIVE PERCENT: 2 % (ref 0–4)
ERYTHROCYTE [DISTWIDTH] IN BLOOD BY AUTOMATED COUNT: 14 % (ref 11.5–14.9)
GFR, ESTIMATED: >90 ML/MIN/1.73M2
GLUCOSE SERPL-MCNC: 110 MG/DL (ref 74–99)
HCT VFR BLD AUTO: 44 % (ref 41–53)
HGB BLD-MCNC: 14.8 G/DL (ref 13.5–17.5)
LYMPHOCYTES NFR BLD: 2.2 K/UL (ref 1–4.8)
LYMPHOCYTES RELATIVE PERCENT: 28 % (ref 24–44)
MCH RBC QN AUTO: 30.3 PG (ref 26–34)
MCHC RBC AUTO-ENTMCNC: 33.6 G/DL (ref 31–37)
MCV RBC AUTO: 90 FL (ref 80–100)
MONOCYTES NFR BLD: 0.7 K/UL (ref 0.1–1.3)
MONOCYTES NFR BLD: 9 % (ref 1–7)
NEUTROPHILS NFR BLD: 60 % (ref 36–66)
NEUTS SEG NFR BLD: 4.8 K/UL (ref 1.3–9.1)
PLATELET # BLD AUTO: 194 K/UL (ref 150–450)
PMV BLD AUTO: 7.6 FL (ref 6–12)
POTASSIUM SERPL-SCNC: 3.8 MMOL/L (ref 3.7–5.3)
RBC # BLD AUTO: 4.88 M/UL (ref 4.5–5.9)
SODIUM SERPL-SCNC: 138 MMOL/L (ref 136–145)
WBC OTHER # BLD: 8 K/UL (ref 3.5–11)

## 2024-11-06 PROCEDURE — 36415 COLL VENOUS BLD VENIPUNCTURE: CPT

## 2024-11-06 PROCEDURE — 2580000003 HC RX 258: Performed by: INTERNAL MEDICINE

## 2024-11-06 PROCEDURE — 6370000000 HC RX 637 (ALT 250 FOR IP): Performed by: INTERNAL MEDICINE

## 2024-11-06 PROCEDURE — 85025 COMPLETE CBC W/AUTO DIFF WBC: CPT

## 2024-11-06 PROCEDURE — 99231 SBSQ HOSP IP/OBS SF/LOW 25: CPT | Performed by: NURSE PRACTITIONER

## 2024-11-06 PROCEDURE — 2060000000 HC ICU INTERMEDIATE R&B

## 2024-11-06 PROCEDURE — 85520 HEPARIN ASSAY: CPT

## 2024-11-06 PROCEDURE — 94760 N-INVAS EAR/PLS OXIMETRY 1: CPT

## 2024-11-06 PROCEDURE — 80048 BASIC METABOLIC PNL TOTAL CA: CPT

## 2024-11-06 PROCEDURE — 6360000002 HC RX W HCPCS: Performed by: INTERNAL MEDICINE

## 2024-11-06 PROCEDURE — 94664 DEMO&/EVAL PT USE INHALER: CPT

## 2024-11-06 PROCEDURE — 99232 SBSQ HOSP IP/OBS MODERATE 35: CPT | Performed by: INTERNAL MEDICINE

## 2024-11-06 RX ORDER — EZETIMIBE 10 MG/1
10 TABLET ORAL EVERY OTHER DAY
Qty: 30 TABLET | Refills: 12 | Status: SHIPPED | OUTPATIENT
Start: 2024-11-06

## 2024-11-06 RX ORDER — METOPROLOL TARTRATE 25 MG/1
25 TABLET, FILM COATED ORAL DAILY
Qty: 30 TABLET | Refills: 12 | Status: SHIPPED | OUTPATIENT
Start: 2024-11-06

## 2024-11-06 RX ADMIN — SODIUM CHLORIDE, PRESERVATIVE FREE 10 ML: 5 INJECTION INTRAVENOUS at 09:45

## 2024-11-06 RX ADMIN — TICAGRELOR 90 MG: 90 TABLET ORAL at 09:45

## 2024-11-06 RX ADMIN — HYDROMORPHONE HYDROCHLORIDE 1 MG: 1 INJECTION, SOLUTION INTRAMUSCULAR; INTRAVENOUS; SUBCUTANEOUS at 04:23

## 2024-11-06 RX ADMIN — EZETIMIBE 10 MG: 10 TABLET ORAL at 09:45

## 2024-11-06 RX ADMIN — METOPROLOL TARTRATE 25 MG: 25 TABLET, FILM COATED ORAL at 09:45

## 2024-11-06 RX ADMIN — ASPIRIN 81 MG: 81 TABLET, CHEWABLE ORAL at 09:45

## 2024-11-06 ASSESSMENT — PAIN DESCRIPTION - LOCATION: LOCATION: CHEST

## 2024-11-06 ASSESSMENT — PAIN SCALES - GENERAL
PAINLEVEL_OUTOF10: 9
PAINLEVEL_OUTOF10: 4

## 2024-11-06 NOTE — DISCHARGE INSTR - COC
Continuity of Care Form    Patient Name: Rommel Marie   :  1960  MRN:  [id number]    Admit date:  2024  Discharge date:  ***    Code Status Order: Full Code   Advance Directives:   Advance Care Flowsheet Documentation             Admitting Physician:  Glenis Sterling MD  PCP: No primary care provider on file.    Discharging Nurse: ***  Discharging Hospital Unit/Room#: 2086/2086-01  Discharging Unit Phone Number: ***    Emergency Contact:   Extended Emergency Contact Information  Primary Emergency Contact: Nila Marie  Home Phone: [phone]  Mobile Phone: [phone]  Relation: Child  Secondary Emergency Contact: Yoly Orozco  Address: [place]  Home Phone: [phone]  Relation: Child    Past Surgical History:  Past Surgical History:   Procedure Laterality Date    BACK SURGERY      CARDIAC CATHETERIZATION  2024    LIVER BIOPSY         Immunization History:   Immunization History   Administered Date(s) Administered    Pneumococcal, PPSV23, PNEUMOVAX 23, (age 2y+), SC/IM, 0.5mL 2015       Active Problems:  Patient Active Problem List   Diagnosis Code    Cerebral infarction (HCC) I63.9    Cerebral infarction due to embolism of left posterior cerebral artery (HCC) I63.432    Hyperglycemia R73.9    Hx pulmonary embolism Z86.711    Hx of hepatitis C Z86.19    Chronic pancreatitis (HCC) K86.1    Right sided weakness R53.1    NSTEMI (non-ST elevated myocardial infarction) (HCC) I21.4    Hypertension I10    L5 spinal cord injury (HCC) S34.105A    Pancreatitis K85.90    Hypercholesterolemia E78.00    S/P angioplasty with stent Z95.820    Dizziness R42    Hx of completed stroke Z86.73    Coronary artery disease involving native coronary artery of native heart without angina pectoris I25.10    Benign paroxysmal positional vertigo due to bilateral vestibular disorder H81.13    Angina pectoris (HCC) I20.9       Isolation/Infection:   Isolation            No Isolation          Patient

## 2024-11-06 NOTE — PROGRESS NOTES
DENIZ Nieto notified that cardiology was not consulted post heart cath and that night time dose of Brilinta was not restarted which instructions from [facility] RN was to administer tonight. Per NP Okay to re consult cardiology.     Dr. Enriquez with cardiology consulted. Writer inquired about anticoagulation administration instructions. See orders.

## 2024-11-06 NOTE — CARE COORDINATION
Writer spoke to Emilia, from [facility] & states, that Lifetime Free Brilinta Card was used for pt.

## 2024-11-06 NOTE — PROGRESS NOTES
[facility]   Progress Note                   Date:   11/6/2024  Patient name: Rommel Marie  Date of admission:  11/5/2024  5:28 PM  MRN:   [id number]  YOB: 1960  PCP: No primary care provider on file.    Reason for Admission: NSTEMI    Subjective:       Clinical Changes / Abnormalities:Pt seen and examined in the room.  Pt denies any CP or sob. Pt s/p PCI  Labs, vitals and tele reviewed-        Medications:   Scheduled Meds:   aspirin  81 mg Oral Daily    atorvastatin  80 mg Oral QPM    ezetimibe  10 mg Oral Every Other Day    metoprolol tartrate  25 mg Oral Daily    sodium chloride flush  5-40 mL IntraVENous 2 times per day    ticagrelor  90 mg Oral BID     Continuous Infusions:   sodium chloride       CBC:   Recent Labs     11/04/24  1413 11/05/24  0602 11/06/24  0344   WBC 7.1 7.2 8.0   HGB 13.7 13.5 14.8    177 194     BMP:    Recent Labs     11/04/24  1413 11/05/24  0602 11/06/24  0344    140 138   K 4.1 3.9 3.8    107 107   CO2 22 25 19*   BUN 16 13 13   CREATININE 1.0 0.8 0.8   GLUCOSE 145* 112* 110*     Hepatic:   Recent Labs     11/04/24  1413   AST 30   ALT 23   BILITOT 0.3   ALKPHOS 104     Troponin:   Recent Labs     11/04/24  1413 11/04/24  1506 11/05/24  0602   TROPHS 21 41* 241*     BNP: No results for input(s): \"BNP\" in the last 72 hours.  Lipids: No results for input(s): \"CHOL\", \"HDL\" in the last 72 hours.    Invalid input(s): \"LDLCALCU\"  INR: No results for input(s): \"INR\" in the last 72 hours.      Echo 6/2022  Summary  Normal left ventricle size and function with an estimated EF >55%.  No segmental wall motion abnormalities seen.  Mild left ventricular hypertrophy.  Normal right ventricular size and function.  Left atrium is normal in size. Right atrium is normal in size.  Unsuccessful bubble study attempt due to lost IV access.  Normal aortic valve structure and function without stenosis or  regurgitation.  Normal aortic root  dimension.  Normal mitral valve structure and function. Trivial mitral regurgitation.  Normal tricuspid valve structure and function. Insignificant tricuspid  regurgitation, unable to estimate RVSP.  The pulmonic valve is normal in structure. Trivial pulmonic insufficiency.  IVC not well visualized.  No significant pericardial effusion is seen.     Stress Test 7/2022  IMPRESSION:     Reversible inferior wall perfusion defect resolves on prone imaging.     No stress-induced ischemia.     No infarct.     Normal LVEF.     Risk stratification: Low     Cardiac Cath 11/2020  Procedure Summary      Single vessel coronary artery disease.   Successful PCI / Drug Eluting Stent of the ostial LAD   Normal LCX and RCA.   Normal LV systolic function.      Recommendations      Routine Post Stent Orders.   Medical therapy as ne        Cardiac cath 11/5/24    Severe stenosis of mid LAD 70% status post drug-eluting stent    IVUS guided PCI of mid LAD    Patient loaded with Brilinta, he needs to stop Plavix and discharged on aspirin and Brilinta    Patient needs high intensity statin    Outpatient follow-up with cardiology team  Objective:   Vitals: /77   Pulse 71   Temp 98.4 °F (36.9 °C) (Oral)   Resp 18   Wt 91 kg (200 lb 9.9 oz)   SpO2 97%   BMI 26.47 kg/m²   General appearance: alert and cooperative with exam  HEENT: Head: Normocephalic, no lesions, without obvious abnormality.  Neck: no JVD, trachea midline, no adenopathy  Lungs: Clear to auscultation  Heart: Regular rate and rhythm, s1/s2 auscultated, no murmurs  Abdomen: soft, non-tender, bowel sounds active  Extremities: no edema, radial site soft.  No hematoma   Neurologic: not done        Assessment / Acute Cardiac Problems:   NSTEMI  CAD with hx of s/p PTCA/KARINA to LAD 2020  HTN  HLP  Hx of CVA  Hx of tobacco abuse    Patient Active Problem List:     Cerebral infarction (HCC)     Cerebral infarction due to embolism of left posterior cerebral artery (HCC)

## 2024-11-06 NOTE — PROGRESS NOTES
CLINICAL PHARMACY NOTE: MEDS TO BEDS    Total # of Prescriptions Filled: 1   The following medications were delivered to the patient:  Brilinta 90mg    Additional Documentation:11/6/24 4:47pm delivered to pt themself put in belongings closet per Nurse Shana no lock-up pt is discharging tonight kbg       3:25pm discharge order is in per Nurse Saldana pt still has an assessment that needs to be done will attempt delivery at last round of [id number] deliveries around 4:45pm     12:24pm Outpatient Pharmacy let pt know via phone and Maty MINER know via [employer] a once per lifetime free Brilinta card being used today pt will need to follow-up with PCP/Specialist post-discharge for alternate plan >$400 pt only uses [facility] or sign up for prescription insurance    -Zetia on file per pt already has at home from home pharmacy [facility] [place] [phone]  -Metoprolol on file has at home

## 2024-11-06 NOTE — PROGRESS NOTES
11/06/24 1316   Encounter Summary   Encounter Overview/Reason  Encounter   Service Provided For Patient   Referral/Consult From Rounding   Last Encounter  11/06/24   Complexity of Encounter Low   Spiritual/Emotional needs   Type Spiritual Support   Assessment/Intervention/Outcome   Intervention Prayer (assurance of)/[place]  ( [place])

## 2024-11-06 NOTE — PROGRESS NOTES
Discharge planner informed writer that the pt cannot afford Brilinta at discharge. Secure message sent to cardiology NP to inform her, and asking for any other recommendations.

## 2024-11-06 NOTE — PROGRESS NOTES
Problems             Last Modified POA    * (Principal) NSTEMI (non-ST elevated myocardial infarction) ([facility]) 11/6/2024 Yes       Plan:     Patient status inpatient in the Progressive Unit/Step down    1.  64-year-old gentleman with underlying history of coronary disease admitted with NSTEMI, seen by cardiology, on heparin drip, going for cardiac cath at [facility],  Coronary disease, continue home medications,  CVA, continue statins and antiplatelets,  Hypertension, monitor blood pressure, continue home medication,  Hyperlipidemia, statins,  DVT prophylaxis on heparin drip at this time,  Full code  2. Disposition 3d    11/6  Seen and examined face-to-face,  64-year-old gentleman admitted with chest pain, NSTEMI, cardiology on board, status post cath and PCI, started on Brilinta continue aspirin statins beta-blocker and Zetia, at this time at the baseline, cardiology okay to be discharged the patient,        Consultations:   IP CONSULT TO INTERNAL MEDICINE  IP CONSULT TO CARDIOLOGY     Patient is admitted as inpatient status because of co-morbidities listed above, severity of signs and symptoms as outlined, requirement for current medical therapies and most importantly because of direct risk to patient if care not provided in a hospital setting.  Expected length of stay > 48 hours.    Glenis Sterling MD  11/6/2024  2:30 PM    Copy sent to Dr. Barreto primary care provider on file.    Please note that this chart was generated using voice recognition Dragon dictation software.  Although every effort was made to ensure the accuracy of this automated transcription, some errors in transcription may have occurred.

## 2024-11-06 NOTE — PROGRESS NOTES
Home Oxygen Evaluation completed.    Patient is on room air.  Resting SpO2 = 97%    SpO2 on room air with exercise = 97%      Sadiq Santacruz RCP  3:27 PM

## 2024-11-06 NOTE — CARE COORDINATION
Writer was notified by Rachael from [facility], that pt. Would be able to use Once Per Lifetime Free Brilinta Card. Cost would be >$400. Pt. Has no Prescription Insurance & used [facility].     Writer did speak to Pt. In regards to this & this will not be affordable.     Writer notified Charge Nurse, Lea.

## 2024-11-06 NOTE — PROGRESS NOTES
Discussed medication(s) with the patient and all questions fully answered. Educated on when to follow up with PCP for new patient acceptance and Cardiology. Reviewed post cath care with teach back. Pt is alert and oriented. No distress noted.  No concerns noted.

## 2024-11-06 NOTE — DISCHARGE SUMMARY
examined on day of discharge and this discharge summary is in conjunction with any daily progress note from day of discharge.    Discharge plan:     Disposition: Home    Physician Follow Up:   [facility]  [phone]  Follow up  Call the above number if you would like a New Family Physician.    [facility]  [address]  [address]  [place] [address]  [phone]  Follow up on 11/19/2024  at 3:15 pm       Requiring Further Evaluation/Follow Up POST HOSPITALIZATION/Incidental Findings:     Diet: cardiac diet    Activity: As tolerated    Instructions to Patient:     Discharge Medications:      Medication List        CONTINUE taking these medications      aspirin 81 MG chewable tablet  Take 1 tablet by mouth daily     atorvastatin 80 MG tablet  Commonly known as: LIPITOR  Take 1 tablet by mouth every evening     ezetimibe 10 MG tablet  Commonly known as: ZETIA  Take 1 tablet by mouth every other day     fluticasone 50 MCG/ACT nasal spray  Commonly known as: FLONASE     metoprolol tartrate 25 MG tablet  Commonly known as: LOPRESSOR  Take 1 tablet by mouth daily     MORPHINE (PAIN PUMP REFILL CHARGE)     nitroGLYCERIN 0.4 MG SL tablet  Commonly known as: NITROSTAT  up to max of 3 total doses. If no relief after 1 dose, call [phone].     ticagrelor 90 MG Tabs tablet  Commonly known as: BRILINTA  Take 1 tablet by mouth 2 times daily               Where to Get Your Medications        These medications were sent to [id number] - [address] -  [phone] - F [phone]  [address] [place] [address]      Phone: [phone]   ezetimibe 10 MG tablet  metoprolol tartrate 25 MG tablet  ticagrelor 90 MG Tabs tablet         No discharge procedures on file.    Time Spent on discharge is  32 mins in patient examination, evaluation, counseling as well as medication reconciliation, prescriptions for required medications, discharge plan and follow  up.    Electronically signed by   Glenis Sterling MD  11/6/2024  2:33 PM      Thank you Dr. Barreto primary care provider on file. for the opportunity to be involved in this patient's care.    Please note that this chart was generated using voice recognition Dragon dictation software.  Although every effort was made to ensure the accuracy of this automated transcription, some errors in transcription may have occurred.

## 2024-11-06 NOTE — PROGRESS NOTES
Rommel HANCOCK at this time walked through the unit of [facility] and around through [facility] back to his room. Patient completed this without the use of a nasal cannula and while maintaining proper balance, no assistive devices. Patients oxygen saturation remained above 95% while ambulating through the halls.

## 2024-11-06 NOTE — PLAN OF CARE
Problem: Chronic Conditions and Co-morbidities  Goal: Patient's chronic conditions and co-morbidity symptoms are monitored and maintained or improved  Outcome: Progressing  Flowsheets (Taken 11/5/2024 2001)  Care Plan - Patient's Chronic Conditions and Co-Morbidity Symptoms are Monitored and Maintained or Improved:   Monitor and assess patient's chronic conditions and comorbid symptoms for stability, deterioration, or improvement   Collaborate with multidisciplinary team to address chronic and comorbid conditions and prevent exacerbation or deterioration   Update acute care plan with appropriate goals if chronic or comorbid symptoms are exacerbated and prevent overall improvement and discharge     Problem: Discharge Planning  Goal: Discharge to home or other facility with appropriate resources  Outcome: Progressing  Flowsheets (Taken 11/5/2024 2001)  Discharge to home or other facility with appropriate resources:   Identify barriers to discharge with patient and caregiver   Arrange for needed discharge resources and transportation as appropriate   Identify discharge learning needs (meds, wound care, etc)   Refer to discharge planning if patient needs post-hospital services based on physician order or complex needs related to functional status, cognitive ability or social support system     Problem: Safety - Adult  Goal: Free from fall injury  Outcome: Progressing  Flowsheets (Taken 11/6/2024 0318)  Free From Fall Injury:   Instruct family/caregiver on patient safety   Based on caregiver fall risk screen, instruct family/caregiver to ask for assistance with transferring infant if caregiver noted to have fall risk factors     Problem: Pain  Goal: Verbalizes/displays adequate comfort level or baseline comfort level  Outcome: Progressing  Flowsheets (Taken 11/6/2024 0318)  Verbalizes/displays adequate comfort level or baseline comfort level:   Encourage patient to monitor pain and request assistance   Assess pain  using appropriate pain scale   Administer analgesics based on type and severity of pain and evaluate response   Implement non-pharmacological measures as appropriate and evaluate response

## 2024-11-06 NOTE — PROGRESS NOTES
11/6/24 1:10pm [facility] Shana 12:28pm let us know can deliver [id number] when discharge order is in [facility] delivery after 2:30pm or can be picked up from [facility] [phone] [address] (Outpatient Surgery entrance- [facility]/[address] entrance) Open Monday-Friday 9:00am-5:30pm closed from 1:00pm-1:30pm for lunch. Closed weekends and major holidays.

## 2024-11-06 NOTE — PLAN OF CARE
Problem: Chronic Conditions and Co-morbidities  Goal: Patient's chronic conditions and co-morbidity symptoms are monitored and maintained or improved  11/6/2024 1625 by Shana Chilel RN  Outcome: Adequate for Discharge  11/6/2024 1625 by Shana Chilel RN  Outcome: Adequate for Discharge  11/6/2024 0318 by Julianna Jenkins RN  Outcome: Progressing  Flowsheets (Taken 11/5/2024 2001)  Care Plan - Patient's Chronic Conditions and Co-Morbidity Symptoms are Monitored and Maintained or Improved:   Monitor and assess patient's chronic conditions and comorbid symptoms for stability, deterioration, or improvement   Collaborate with multidisciplinary team to address chronic and comorbid conditions and prevent exacerbation or deterioration   Update acute care plan with appropriate goals if chronic or comorbid symptoms are exacerbated and prevent overall improvement and discharge     Problem: Discharge Planning  Goal: Discharge to home or other facility with appropriate resources  11/6/2024 1625 by Shana Chilel RN  Outcome: Adequate for Discharge  11/6/2024 1625 by Shana Chilel RN  Outcome: Adequate for Discharge  11/6/2024 0318 by Julianna Jenkins RN  Outcome: Progressing  Flowsheets (Taken 11/5/2024 2001)  Discharge to home or other facility with appropriate resources:   Identify barriers to discharge with patient and caregiver   Arrange for needed discharge resources and transportation as appropriate   Identify discharge learning needs (meds, wound care, etc)   Refer to discharge planning if patient needs post-hospital services based on physician order or complex needs related to functional status, cognitive ability or social support system     Problem: Safety - Adult  Goal: Free from fall injury  11/6/2024 1625 by Shana Chilel RN  Outcome: Adequate for Discharge  11/6/2024 1625 by Shana Chilel RN  Outcome: Adequate for Discharge  11/6/2024 0318 by Julianna Jenkins RN  Outcome: Progressing  Flowsheets  (Taken 11/6/2024 0318)  Free From Fall Injury:   Instruct family/caregiver on patient safety   Based on caregiver fall risk screen, instruct family/caregiver to ask for assistance with transferring infant if caregiver noted to have fall risk factors     Problem: Pain  Goal: Verbalizes/displays adequate comfort level or baseline comfort level  11/6/2024 1625 by Shana Chilel, RN  Outcome: Adequate for Discharge  11/6/2024 1625 by Shana Chilel, RN  Outcome: Adequate for Discharge  11/6/2024 0318 by Julianna Jenkins RN  Outcome: Progressing  Flowsheets (Taken 11/6/2024 0318)  Verbalizes/displays adequate comfort level or baseline comfort level:   Encourage patient to monitor pain and request assistance   Assess pain using appropriate pain scale   Administer analgesics based on type and severity of pain and evaluate response   Implement non-pharmacological measures as appropriate and evaluate response

## 2024-11-06 NOTE — PROGRESS NOTES
Writer sent a secure message the cardiology NP, Adeola Yee, regarding Brilinta cost for the pt.  NP recommended to \"give him 30 day voucher\" , and stated then can \"switch to something else in clinic.\"  Discharge plannerSue, informed of above.

## 2024-11-06 NOTE — CARE COORDINATION
Case Management Assessment  Initial Evaluation    Date/Time of Evaluation: 11/6/2024 9:19 AM  Assessment Completed by: Sue Dennis RN    If patient is discharged prior to next notation, then this note serves as note for discharge by case management.    Patient Name: Rommel Marie                   YOB: 1960  Diagnosis: NSTEMI                   Date / Time: 11/5/2024  5:28 PM    Patient Admission Status: Outpatient in a bed   Readmission Risk (Low < 19, Mod (19-27), High > 27): Readmission Risk Score: 12.5    Current PCP: No primary care provider on file.  PCP verified by CM? No (Has No PCP, denies assist to find one. Information placed for [facility] for PCP.)    Chart Reviewed: Yes      History Provided by: Patient  Patient Orientation: Alert and Oriented    Patient Cognition: Alert    Hospitalization in the last 30 days (Readmission):  No  , Pt went for Cardiac Cath & Returned.  If yes, Readmission Assessment in CM Navigator will be completed.    Advance Directives:      Code Status: Full Code   Patient's Primary Decision Maker is:      Primary Decision Maker: Yoly Orozco - Child - [phone]    Discharge Planning:    Patient lives with: Children, Family Members Type of Home: House  Primary Care Giver: Self  Patient Support Systems include: Children   Current Financial resources: Medicare  Current community resources: None  Current services prior to admission: None            Current DME:              Type of Home Care services:  None    ADLS  Prior functional level: Independent in ADLs/IADLs  Current functional level: Independent in ADLs/IADLs    PT AM-PAC:   /24  OT AM-PAC:   /24    Family can provide assistance at DC: Yes  Would you like Case Management to discuss the discharge plan with any other family members/significant others, and if so, who?    Plans to Return to Present Housing: Yes  Other Identified Issues/Barriers to RETURNING to current housing: None  Potential  Assistance needed at discharge: N/A            Potential DME:    Patient expects to discharge to: House  Plan for transportation at discharge: Self    Financial    Payor: MEDICARE / Plan: MEDICARE PART A AND B / Product Type: *No Product type* /     Does insurance require precert for SNF: No    Potential assistance Purchasing Medications: Yes (Started on Brilinta)  Meds-to-Beds request:        [id number] - [facility] [place] - [address] - P [phone] - F [phone]  [address] [facility] [place] [address]  Phone: [phone] Fax: [phone]    [id number] - [place], [place] - [address] [phone] - F [phone]  [address] [address]  Phone: [phone] Fax: [phone]      Notes:    Factors facilitating achievement of predicted outcomes: Family support, Cooperative, and Pleasant    Barriers to discharge: None, Follow for possible new PCP    Additional Case Management Notes: 11/6/24 Medicare Pt. Lives in 1 story home w/ Son/His GF/2 Kids, DME- [place], No PCP, Denies Assistance, info. For [facility] placed on AVS, Cardio, Cardiac Cath yesterday w/ [facility]. Started on Brilinta, [place] 13% Will follow//KB    The Plan for Transition of Care is related to the following treatment goals of NSTEMI    IF APPLICABLE: The Patient and/or patient representative Rommel and his family were provided with a choice of provider and agrees with the discharge plan. Freedom of choice list with basic dialogue that supports the patient's individualized plan of care/goals and shares the quality data associated with the providers was provided to: Patient   Patient Representative Name:       The Patient and/or Patient Representative Agree with the Discharge Plan? Yes    Sue Dennis RN  Case Management Department  Ph:  Fax:

## 2024-11-07 ENCOUNTER — CARE COORDINATION (OUTPATIENT)
Dept: CARE COORDINATION | Age: 64
End: 2024-11-07

## 2024-11-07 ENCOUNTER — ENROLLMENT (OUTPATIENT)
Dept: CARE COORDINATION | Age: 64
End: 2024-11-07

## 2024-11-07 NOTE — CARE COORDINATION
Care Transitions Note    Initial Call - Call within 2 business days of discharge: Yes    Attempted to reach patient for transitions of care follow up. Unable to reach patient.    Outreach Attempts:   Unable to leave message.     Patient: Rommel Marie    Patient : 1960   MRN: [id number]    Reason for Admission: angina pectoris  Discharge Date: 24  RURS: Readmission Risk Score: 13.5    Last Discharge Facility       Date Complaint Diagnosis Description Type Department Provider    24   Admission (Discharged) Glenis Mortensen MD    24  Angina pectoris (HCC) Admission (Discharged) Stephanie Crocker MD          # 1 attempt-Attempted initial 24 hour hospital follow up call. Unable to leave a message, mailbox not set up//JU    Was this an external facility discharge? No    Follow Up Appointment:   Patient does not have a follow up appointment scheduled at time of call.     Future Appointments         Provider Specialty Dept Phone    2024 3:15 PM Sarah Sierra, APRN - [facility] [phone]    2025 10:30 AM Servando Paris DO Cardiology [phone]            Plan for follow-up on next business day.  2nd attempt 24 hr HFU call     Jessica Peace RN

## 2024-11-08 ENCOUNTER — CARE COORDINATION (OUTPATIENT)
Dept: CARE COORDINATION | Age: 64
End: 2024-11-08

## 2024-11-08 NOTE — CARE COORDINATION
Care Transitions Note    Initial Call - Call within 2 business days of discharge: Yes    Attempted to reach patient for transitions of care follow up. Unable to reach patient.    Outreach Attempts:   Multiple attempts to contact patient at phone numbers on file.     Patient: Rommel Marie    Patient : 1960   MRN: [id number]    Reason for Admission:   Discharge Date: 24  RURS: Readmission Risk Score: 13.5    Last Discharge Facility       Date Complaint Diagnosis Description Type Department Provider    24   Admission (Discharged) Glenis Mortensen MD    24  Angina pectoris (HCC) Admission (Discharged) Stephanie Crocker MD          # 2 attempts-Attempted initial 24 hour hospital follow up call. Left a Hipaa compliant message with name and call back information. Requested return call to [phone].   Was this an external facility discharge? No    Follow Up Appointment:   Patient does not have a follow up appointment scheduled at time of call.     Future Appointments         Provider Specialty Dept Phone    2024 3:15 PM Sarah Sierra, APRN - [facility] [phone]    2025 10:30 AM Servando Paris DO Cardiology [phone]          No ticket for scheduling sent patient's mychart is pending  No further follow-up call indicated     Jessica Peace RN

## 2024-11-21 LAB
EKG Q-T INTERVAL: 432 MS
EKG QRS DURATION: 74 MS
EKG QTC CALCULATION (BAZETT): 438 MS
EKG R AXIS: -19 DEGREES
EKG T AXIS: 58 DEGREES
EKG VENTRICULAR RATE: 62 BPM

## 2024-12-17 ENCOUNTER — HOSPITAL ENCOUNTER (OUTPATIENT)
Age: 64
Discharge: HOME | End: 2024-12-17
Payer: MEDICARE

## 2024-12-17 DIAGNOSIS — I25.10 ATHEROSCLEROSIS OF NATIVE CORONARY ARTERY OF NATIVE HEART WITHOUT ANGINA PECTORIS: ICD-10-CM

## 2024-12-17 DIAGNOSIS — I10 ESSENTIAL HYPERTENSION: ICD-10-CM

## 2024-12-17 DIAGNOSIS — R94.31 ABNORMAL ECG: ICD-10-CM

## 2024-12-17 DIAGNOSIS — E78.2 MIXED HYPERLIPIDEMIA: ICD-10-CM

## 2024-12-17 DIAGNOSIS — Z98.61 CORONARY ANGIOPLASTY STATUS: ICD-10-CM

## 2024-12-17 LAB
ALT SERPL-CCNC: 25 U/L (ref 10–50)
AST SERPL-CCNC: 34 U/L (ref 10–50)
CHOLEST SERPL-MCNC: 116 MG/DL (ref 0–199)
CHOLESTEROL/HDL RATIO: 2.9
HDLC SERPL-MCNC: 40 MG/DL
LDLC SERPL CALC-MCNC: 57 MG/DL (ref 0–100)
TRIGL SERPL-MCNC: 93 MG/DL (ref 0–149)

## 2024-12-17 PROCEDURE — 84450 TRANSFERASE (AST) (SGOT): CPT

## 2024-12-17 PROCEDURE — 80061 LIPID PANEL: CPT

## 2024-12-17 PROCEDURE — 36415 COLL VENOUS BLD VENIPUNCTURE: CPT

## 2024-12-17 PROCEDURE — 84460 ALANINE AMINO (ALT) (SGPT): CPT

## 2025-01-28 ENCOUNTER — OFFICE VISIT (OUTPATIENT)
Dept: INTERNAL MEDICINE CLINIC | Age: 65
End: 2025-01-28
Payer: MEDICARE

## 2025-01-28 VITALS
HEART RATE: 74 BPM | BODY MASS INDEX: 25.45 KG/M2 | DIASTOLIC BLOOD PRESSURE: 66 MMHG | OXYGEN SATURATION: 97 % | WEIGHT: 192 LBS | SYSTOLIC BLOOD PRESSURE: 116 MMHG | HEIGHT: 73 IN

## 2025-01-28 DIAGNOSIS — I63.432 CEREBRAL INFARCTION DUE TO EMBOLISM OF LEFT POSTERIOR CEREBRAL ARTERY (HCC): ICD-10-CM

## 2025-01-28 DIAGNOSIS — I63.439 CEREBRAL INFARCTION DUE TO EMBOLISM OF POSTERIOR CEREBRAL ARTERY, UNSPECIFIED BLOOD VESSEL LATERALITY (HCC): ICD-10-CM

## 2025-01-28 DIAGNOSIS — Z95.820 S/P ANGIOPLASTY WITH STENT: ICD-10-CM

## 2025-01-28 DIAGNOSIS — Z86.19 HX OF HEPATITIS C: Chronic | ICD-10-CM

## 2025-01-28 DIAGNOSIS — S34.105S: ICD-10-CM

## 2025-01-28 DIAGNOSIS — E78.00 HYPERCHOLESTEROLEMIA: ICD-10-CM

## 2025-01-28 DIAGNOSIS — K86.1 CHRONIC PANCREATITIS, UNSPECIFIED PANCREATITIS TYPE (HCC): ICD-10-CM

## 2025-01-28 DIAGNOSIS — Z91.81 AT HIGH RISK FOR FALLS: ICD-10-CM

## 2025-01-28 DIAGNOSIS — R53.1 RIGHT SIDED WEAKNESS: ICD-10-CM

## 2025-01-28 DIAGNOSIS — Z86.711 HX PULMONARY EMBOLISM: ICD-10-CM

## 2025-01-28 DIAGNOSIS — I25.10 CORONARY ARTERY DISEASE INVOLVING NATIVE CORONARY ARTERY OF NATIVE HEART WITHOUT ANGINA PECTORIS: Primary | ICD-10-CM

## 2025-01-28 DIAGNOSIS — Z12.11 COLON CANCER SCREENING: ICD-10-CM

## 2025-01-28 PROCEDURE — 3078F DIAST BP <80 MM HG: CPT | Performed by: INTERNAL MEDICINE

## 2025-01-28 PROCEDURE — 1123F ACP DISCUSS/DSCN MKR DOCD: CPT | Performed by: INTERNAL MEDICINE

## 2025-01-28 PROCEDURE — 3074F SYST BP LT 130 MM HG: CPT | Performed by: INTERNAL MEDICINE

## 2025-01-28 PROCEDURE — 99204 OFFICE O/P NEW MOD 45 MIN: CPT | Performed by: INTERNAL MEDICINE

## 2025-01-28 PROCEDURE — G8419 CALC BMI OUT NRM PARAM NOF/U: HCPCS | Performed by: INTERNAL MEDICINE

## 2025-01-28 PROCEDURE — 1036F TOBACCO NON-USER: CPT | Performed by: INTERNAL MEDICINE

## 2025-01-28 PROCEDURE — 3017F COLORECTAL CA SCREEN DOC REV: CPT | Performed by: INTERNAL MEDICINE

## 2025-01-28 PROCEDURE — G8427 DOCREV CUR MEDS BY ELIG CLIN: HCPCS | Performed by: INTERNAL MEDICINE

## 2025-01-28 ASSESSMENT — PATIENT HEALTH QUESTIONNAIRE - PHQ9
SUM OF ALL RESPONSES TO PHQ9 QUESTIONS 1 & 2: 0
1. LITTLE INTEREST OR PLEASURE IN DOING THINGS: NOT AT ALL
SUM OF ALL RESPONSES TO PHQ QUESTIONS 1-9: 0
2. FEELING DOWN, DEPRESSED OR HOPELESS: NOT AT ALL
SUM OF ALL RESPONSES TO PHQ QUESTIONS 1-9: 0

## 2025-01-28 NOTE — PROGRESS NOTES
[facility]  [address] [address]  Dept: [phone]  Dept Fax: [phone]     Name: Rommel Marie  : 1960           Chief Complaint   Patient presents with    New Patient    Joint Pain     Multiple joint pain, especially shoulders and hips, worse in the morning, currently using pain pump      Joint Swelling     Bilateral ankles        History of Present Illness:    HPI  Clint is here to establish care,  Did not have the primary care doctor,  Have been following with cardiology Dr. Aloe's group,  In November he had heart attack, had cardiac cath done at [facility],  And made appointment with our office to establish as a patient  Past Medical History:    Past Medical History:   Diagnosis Date    CAD (coronary artery disease)     Hepatitis C     Hyperlipidemia     Hypertension     L5 spinal cord injury (HCC)     problem with right leg nerve    MI (myocardial infarction) (HCC)     Pancreatitis     Pulmonary embolism (HCC)     Stroke (HCC)     TIA (transient ischemic attack)       Reviewed all health maintenance requirements and ordered appropriate tests  Health Maintenance Due   Topic Date Due    HIV screen  Never done    Colorectal Cancer Screen  Never done    Shingles vaccine (1 of 2) Never done    DTaP/Tdap/Td vaccine (1 - Tdap) 2011    Hepatitis B vaccine (1 of 3 - Risk 3-dose series) Never done    A1C test (Diabetic or Prediabetic)  2023    Annual Wellness Visit (Medicare)  Never done    Pneumococcal 65+ years Vaccine (2 of 2 - PCV) 05/15/2024    Flu vaccine (1) Never done    COVID-19 Vaccine (1 - - season) Never done       Past Surgical History:    Past Surgical History:   Procedure Laterality Date    BACK SURGERY      CARDIAC CATHETERIZATION  2024    CARDIAC PROCEDURE N/A 2024    corbin / [facility] CARDIAC CATH LAB    CARDIAC PROCEDURE N/A

## 2025-01-28 NOTE — PROGRESS NOTES
\"Have you been to the ER, urgent care clinic since your last visit?  Hospitalized since your last visit?\"    11/5/24  Left heart cath [facility] Nstemi     “Have you seen or consulted any other health care providers outside our system since your last visit?”    NO      “Have you had a colorectal cancer screening such as a colonoscopy/FIT/Cologuard?    NO    No colonoscopy on file  No cologuard on file  No FIT/FOBT on file   No flexible sigmoidoscopy on file

## 2025-02-04 LAB — NONINV COLON CA DNA+OCC BLD SCRN STL QL: NORMAL

## 2025-02-22 LAB — NONINV COLON CA DNA+OCC BLD SCRN STL QL: NEGATIVE

## 2025-02-24 ENCOUNTER — HOSPITAL ENCOUNTER (EMERGENCY)
Age: 65
Discharge: HOME | End: 2025-02-24
Attending: EMERGENCY MEDICINE
Payer: MEDICARE

## 2025-02-24 VITALS
BODY MASS INDEX: 25.18 KG/M2 | WEIGHT: 190 LBS | SYSTOLIC BLOOD PRESSURE: 138 MMHG | HEART RATE: 104 BPM | DIASTOLIC BLOOD PRESSURE: 73 MMHG | HEIGHT: 73 IN | RESPIRATION RATE: 17 BRPM | OXYGEN SATURATION: 100 % | TEMPERATURE: 97.3 F

## 2025-02-24 DIAGNOSIS — L03.119 CELLULITIS OF LOWER EXTREMITY, UNSPECIFIED LATERALITY: ICD-10-CM

## 2025-02-24 DIAGNOSIS — R60.0 BILATERAL LEG EDEMA: Primary | ICD-10-CM

## 2025-02-24 LAB
ANION GAP SERPL CALCULATED.3IONS-SCNC: 10 MMOL/L (ref 9–16)
BASOPHILS # BLD: 0.1 K/UL (ref 0–0.2)
BASOPHILS NFR BLD: 1 % (ref 0–2)
BUN SERPL-MCNC: 17 MG/DL (ref 8–23)
CALCIUM SERPL-MCNC: 9.4 MG/DL (ref 8.6–10.4)
CHLORIDE SERPL-SCNC: 109 MMOL/L (ref 98–107)
CO2 SERPL-SCNC: 24 MMOL/L (ref 20–31)
CREAT SERPL-MCNC: 0.9 MG/DL (ref 0.7–1.2)
EOSINOPHIL # BLD: 0.1 K/UL (ref 0–0.4)
EOSINOPHILS RELATIVE PERCENT: 3 % (ref 0–4)
ERYTHROCYTE [DISTWIDTH] IN BLOOD BY AUTOMATED COUNT: 13 % (ref 11.5–14.9)
GFR, ESTIMATED: >90 ML/MIN/1.73M2
GLUCOSE SERPL-MCNC: 99 MG/DL (ref 74–99)
HCT VFR BLD AUTO: 43.1 % (ref 41–53)
HGB BLD-MCNC: 14.3 G/DL (ref 13.5–17.5)
LYMPHOCYTES NFR BLD: 1.3 K/UL (ref 1–4.8)
LYMPHOCYTES RELATIVE PERCENT: 30 % (ref 24–44)
MCH RBC QN AUTO: 30 PG (ref 26–34)
MCHC RBC AUTO-ENTMCNC: 33.2 G/DL (ref 31–37)
MCV RBC AUTO: 90.2 FL (ref 80–100)
MONOCYTES NFR BLD: 0.5 K/UL (ref 0.1–1.3)
MONOCYTES NFR BLD: 10 % (ref 1–7)
NEUTROPHILS NFR BLD: 56 % (ref 36–66)
NEUTS SEG NFR BLD: 2.5 K/UL (ref 1.3–9.1)
PLATELET # BLD AUTO: 187 K/UL (ref 150–450)
PMV BLD AUTO: 7.9 FL (ref 6–12)
POTASSIUM SERPL-SCNC: 4.2 MMOL/L (ref 3.7–5.3)
RBC # BLD AUTO: 4.77 M/UL (ref 4.5–5.9)
SODIUM SERPL-SCNC: 143 MMOL/L (ref 136–145)
WBC OTHER # BLD: 4.5 K/UL (ref 3.5–11)

## 2025-02-24 PROCEDURE — 99283 EMERGENCY DEPT VISIT LOW MDM: CPT

## 2025-02-24 PROCEDURE — 36415 COLL VENOUS BLD VENIPUNCTURE: CPT

## 2025-02-24 PROCEDURE — 85025 COMPLETE CBC W/AUTO DIFF WBC: CPT

## 2025-02-24 PROCEDURE — 80048 BASIC METABOLIC PNL TOTAL CA: CPT

## 2025-02-24 PROCEDURE — 6370000000 HC RX 637 (ALT 250 FOR IP): Performed by: EMERGENCY MEDICINE

## 2025-02-24 RX ORDER — CEPHALEXIN 500 MG/1
500 CAPSULE ORAL 3 TIMES DAILY
Qty: 21 CAPSULE | Refills: 0 | Status: SHIPPED | OUTPATIENT
Start: 2025-02-24 | End: 2025-03-03

## 2025-02-24 RX ORDER — POTASSIUM CHLORIDE 1500 MG/1
20 TABLET, EXTENDED RELEASE ORAL DAILY
Qty: 3 TABLET | Refills: 0 | Status: SHIPPED | OUTPATIENT
Start: 2025-02-24 | End: 2025-02-27

## 2025-02-24 RX ORDER — FUROSEMIDE 20 MG/1
20 TABLET ORAL DAILY
Qty: 3 TABLET | Refills: 0 | Status: SHIPPED | OUTPATIENT
Start: 2025-02-24 | End: 2025-02-27

## 2025-02-24 RX ADMIN — CEPHALEXIN 500 MG: 250 CAPSULE ORAL at 10:25

## 2025-02-24 ASSESSMENT — PAIN DESCRIPTION - ORIENTATION: ORIENTATION: RIGHT;LEFT

## 2025-02-24 ASSESSMENT — PAIN DESCRIPTION - LOCATION: LOCATION: FOOT

## 2025-02-24 ASSESSMENT — PAIN SCALES - GENERAL: PAINLEVEL_OUTOF10: 8

## 2025-02-24 ASSESSMENT — PAIN - FUNCTIONAL ASSESSMENT: PAIN_FUNCTIONAL_ASSESSMENT: 0-10

## 2025-02-25 NOTE — ED PROVIDER NOTES
Dispense:  21 capsule     Refill:  0    furosemide (LASIX) 20 MG tablet     Sig: Take 1 tablet by mouth daily for 3 days     Dispense:  3 tablet     Refill:  0    potassium chloride (KLOR-CON M) 20 MEQ extended release tablet     Sig: Take 1 tablet by mouth daily for 3 days     Dispense:  3 tablet     Refill:  0     DISCHARGE PRESCRIPTIONS:  Discharge Medication List as of 2/24/2025 11:15 AM        START taking these medications    Details   cephALEXin (KEFLEX) 500 MG capsule Take 1 capsule by mouth 3 times daily for 7 days, Disp-21 capsule, R-0Normal      furosemide (LASIX) 20 MG tablet Take 1 tablet by mouth daily for 3 days, Disp-3 tablet, R-0Normal      potassium chloride (KLOR-CON M) 20 MEQ extended release tablet Take 1 tablet by mouth daily for 3 days, Disp-3 tablet, R-0Normal           PHYSICIAN CONSULTS ORDERED THIS ENCOUNTER:  None     FINAL IMPRESSION      1. Bilateral leg edema    2. Cellulitis of lower extremity, unspecified laterality          DISPOSITION/PLAN   DISPOSITION Decision To Discharge 02/24/2025 11:14:12 AM   DISPOSITION CONDITION Stable           PATIENT REFERRED TO:  Glenis Sterling MD  [address] [facility] [address]  [phone]          [facility] Emergency Department  [address] [address]  [phone]    If symptoms worsen      DISCHARGE MEDICATIONS:  Discharge Medication List as of 2/24/2025 11:15 AM        START taking these medications    Details   cephALEXin (KEFLEX) 500 MG capsule Take 1 capsule by mouth 3 times daily for 7 days, Disp-21 capsule, R-0Normal      furosemide (LASIX) 20 MG tablet Take 1 tablet by mouth daily for 3 days, Disp-3 tablet, R-0Normal      potassium chloride (KLOR-CON M) 20 MEQ extended release tablet Take 1 tablet by mouth daily for 3 days, Disp-3 tablet, R-0Normal               Bunny Lay MD  Attending Emergency Physician                     Bunny Lay MD  02/24/25 1925

## 2025-02-28 ENCOUNTER — CARE COORDINATION (OUTPATIENT)
Dept: CARE COORDINATION | Age: 65
End: 2025-02-28

## 2025-02-28 NOTE — CARE COORDINATION
Ambulatory Care Coordination Note     2/28/2025 12:06 PM     ACM outreach attempt by this ACM today to offer care management services. ACM was unable to reach the patient by telephone today; no VM set up.   ED follow up call to offer care coordination.     ACM: GEO MOSS RN       PCP/Specialist follow up:   Future Appointments         Provider Specialty Dept Phone    5/6/2025 12:30 PM Glenis Sterling MD Internal Medicine [phone]    6/4/2025 1:45 PM Servando Paris DO Cardiology [phone]

## 2025-03-13 ENCOUNTER — HOSPITAL ENCOUNTER (EMERGENCY)
Dept: VASCULAR LAB | Age: 65
Discharge: HOME | End: 2025-03-15
Attending: EMERGENCY MEDICINE
Payer: MEDICARE

## 2025-03-13 ENCOUNTER — HOSPITAL ENCOUNTER (EMERGENCY)
Age: 65
Discharge: HOME | End: 2025-03-13
Attending: EMERGENCY MEDICINE
Payer: MEDICARE

## 2025-03-13 VITALS
OXYGEN SATURATION: 95 % | BODY MASS INDEX: 25.18 KG/M2 | TEMPERATURE: 97.7 F | HEIGHT: 73 IN | HEART RATE: 73 BPM | DIASTOLIC BLOOD PRESSURE: 74 MMHG | WEIGHT: 190 LBS | SYSTOLIC BLOOD PRESSURE: 115 MMHG | RESPIRATION RATE: 12 BRPM

## 2025-03-13 DIAGNOSIS — L03.115 CELLULITIS OF RIGHT LOWER EXTREMITY: Primary | ICD-10-CM

## 2025-03-13 LAB
ALBUMIN SERPL-MCNC: 3.8 G/DL (ref 3.5–5.2)
ALP SERPL-CCNC: 115 U/L (ref 40–129)
ALT SERPL-CCNC: 26 U/L (ref 10–50)
ANION GAP SERPL CALCULATED.3IONS-SCNC: 9 MMOL/L (ref 9–16)
AST SERPL-CCNC: 39 U/L (ref 10–50)
BASOPHILS # BLD: 0.1 K/UL (ref 0–0.2)
BASOPHILS NFR BLD: 1 % (ref 0–2)
BILIRUB SERPL-MCNC: 0.2 MG/DL (ref 0–1.2)
BUN SERPL-MCNC: 17 MG/DL (ref 8–23)
CALCIUM SERPL-MCNC: 9.2 MG/DL (ref 8.6–10.4)
CHLORIDE SERPL-SCNC: 107 MMOL/L (ref 98–107)
CO2 SERPL-SCNC: 22 MMOL/L (ref 20–31)
CREAT SERPL-MCNC: 0.9 MG/DL (ref 0.7–1.2)
ECHO BSA: 2.11 M2
EOSINOPHIL # BLD: 0.2 K/UL (ref 0–0.4)
EOSINOPHILS RELATIVE PERCENT: 4 % (ref 0–4)
ERYTHROCYTE [DISTWIDTH] IN BLOOD BY AUTOMATED COUNT: 13.1 % (ref 11.5–14.9)
GFR, ESTIMATED: >90 ML/MIN/1.73M2
GLUCOSE SERPL-MCNC: 98 MG/DL (ref 74–99)
HCT VFR BLD AUTO: 43.7 % (ref 41–53)
HGB BLD-MCNC: 14.2 G/DL (ref 13.5–17.5)
LYMPHOCYTES NFR BLD: 2.3 K/UL (ref 1–4.8)
LYMPHOCYTES RELATIVE PERCENT: 43 % (ref 24–44)
MCH RBC QN AUTO: 29.4 PG (ref 26–34)
MCHC RBC AUTO-ENTMCNC: 32.6 G/DL (ref 31–37)
MCV RBC AUTO: 90.3 FL (ref 80–100)
MONOCYTES NFR BLD: 0.5 K/UL (ref 0.1–1.3)
MONOCYTES NFR BLD: 10 % (ref 1–7)
NEUTROPHILS NFR BLD: 42 % (ref 36–66)
NEUTS SEG NFR BLD: 2.3 K/UL (ref 1.3–9.1)
PLATELET # BLD AUTO: 210 K/UL (ref 150–450)
PMV BLD AUTO: 7.8 FL (ref 6–12)
POTASSIUM SERPL-SCNC: 4.5 MMOL/L (ref 3.7–5.3)
PROT SERPL-MCNC: 6.5 G/DL (ref 6.6–8.7)
RBC # BLD AUTO: 4.84 M/UL (ref 4.5–5.9)
SODIUM SERPL-SCNC: 138 MMOL/L (ref 136–145)
WBC OTHER # BLD: 5.4 K/UL (ref 3.5–11)

## 2025-03-13 PROCEDURE — 93970 EXTREMITY STUDY: CPT

## 2025-03-13 PROCEDURE — 99284 EMERGENCY DEPT VISIT MOD MDM: CPT

## 2025-03-13 PROCEDURE — 6370000000 HC RX 637 (ALT 250 FOR IP): Performed by: EMERGENCY MEDICINE

## 2025-03-13 PROCEDURE — 85025 COMPLETE CBC W/AUTO DIFF WBC: CPT

## 2025-03-13 PROCEDURE — 93970 EXTREMITY STUDY: CPT | Performed by: SURGERY

## 2025-03-13 PROCEDURE — 80053 COMPREHEN METABOLIC PANEL: CPT

## 2025-03-13 PROCEDURE — 36415 COLL VENOUS BLD VENIPUNCTURE: CPT

## 2025-03-13 RX ORDER — DOXYCYCLINE 100 MG/1
100 CAPSULE ORAL ONCE
Status: COMPLETED | OUTPATIENT
Start: 2025-03-13 | End: 2025-03-13

## 2025-03-13 RX ORDER — DOXYCYCLINE HYCLATE 100 MG
100 TABLET ORAL 2 TIMES DAILY
Qty: 14 TABLET | Refills: 0 | Status: SHIPPED | OUTPATIENT
Start: 2025-03-13 | End: 2025-03-20

## 2025-03-13 RX ADMIN — DOXYCYCLINE 100 MG: 100 CAPSULE ORAL at 15:13

## 2025-03-13 NOTE — ED TRIAGE NOTES
Mode of arrival (squad #, walk in, police, etc) : walk-in        Chief complaint(s): bilateral lag pain/cellulitis        Arrival Note (brief scenario, treatment PTA, etc).: pt reports not being able to get into PCP. Pt reports antibiotic for legs already, pt called doctors office and was told to come here.         C= \"Have you ever felt that you should Cut down on your drinking?\"  No  A= \"Have people Annoyed you by criticizing your drinking?\"  No  G= \"Have you ever felt bad or Guilty about your drinking?\"  No  E= \"Have you ever had a drink as an Eye-opener first thing in the morning to steady your nerves or to help a hangover?\"  No      Deferred []      Reason for deferring: N/A    *If yes to two or more: probable alcohol abuse.*

## 2025-03-13 NOTE — ED PROVIDER NOTES
EMERGENCY DEPARTMENT ENCOUNTER    Pt Name: Rommel Marie  MRN: [id number]  Birthdate 1960  Date of evaluation: 3/13/25  CHIEF COMPLAINT       Chief Complaint   Patient presents with    Leg Swelling    Wound Check     HISTORY OF PRESENT ILLNESS   HPI  Right and left leg swelling.  Right has some redness is.  He was put on Keflex a week ago.  It is not improving.  He states he has had cellulitis before previously was on 2 antibiotics which worked very well.  He does not think the Keflex is working.  He has chronic edema both legs.  He has a history of heart disease.  Compliant with his medications.  Tried to see his PCP this week but there were no appointments available so he just came here to get checked out      REVIEW OF SYSTEMS     Review of Systems   All other systems reviewed and are negative.    PASTMEDICAL HISTORY     Past Medical History:   Diagnosis Date    CAD (coronary artery disease)     Hepatitis C     Hyperlipidemia     Hypertension     L5 spinal cord injury (HCC)     problem with right leg nerve    MI (myocardial infarction) (HCC)     Pancreatitis     Pulmonary embolism (HCC)     Stroke (HCC)     TIA (transient ischemic attack)      Past Problem List  Patient Active Problem List   Diagnosis Code    Cerebral infarction (HCC) I63.9    Cerebral infarction due to embolism of left posterior cerebral artery (HCC) I63.432    Hyperglycemia R73.9    Hx pulmonary embolism Z86.711    Hx of hepatitis C Z86.19    Chronic pancreatitis (HCC) K86.1    Right sided weakness R53.1    NSTEMI (non-ST elevated myocardial infarction) (HCC) I21.4    Hypertension I10    L5 spinal cord injury (HCC) S34.105A    Pancreatitis K85.90    Hypercholesterolemia E78.00    S/P angioplasty with stent Z95.820    Dizziness R42    Hx of completed stroke Z86.73    Coronary artery disease involving native coronary artery of native heart without angina pectoris I25.10    Benign paroxysmal positional vertigo due to bilateral vestibular

## 2025-05-07 ENCOUNTER — TELEPHONE (OUTPATIENT)
Dept: INTERNAL MEDICINE CLINIC | Age: 65
End: 2025-05-07

## 2025-05-22 ENCOUNTER — TELEPHONE (OUTPATIENT)
Dept: INTERNAL MEDICINE CLINIC | Age: 65
End: 2025-05-22

## 2025-07-01 ENCOUNTER — TELEPHONE (OUTPATIENT)
Dept: INTERNAL MEDICINE CLINIC | Age: 65
End: 2025-07-01

## 2025-07-30 ENCOUNTER — TELEPHONE (OUTPATIENT)
Dept: INTERNAL MEDICINE CLINIC | Age: 65
End: 2025-07-30

## (undated) DEVICE — CATH BLLN ANGIO 2.75X15MM SC EUPHORA RX

## (undated) DEVICE — TRAY SURG CUST CRD CATH TOLEDO

## (undated) DEVICE — GUIDEWIRE 35/260/FC/PTFE/3J: Brand: GUIDEWIRE

## (undated) DEVICE — CATHETER GUID 6FR L100CM DIA0.071IN NYL SHFT EBU3.5

## (undated) DEVICE — ANGIOGRAPHIC CATHETER: Brand: EXPO™

## (undated) DEVICE — BAND COMPR L24CM REG CLR PLAS HEMSTAT EXT HK AND LOOP RETEN

## (undated) DEVICE — Device: Brand: EAGLE EYE PLATINUM RX DIGITAL IVUS CATHETER

## (undated) DEVICE — GLIDESHEATH SLENDER STAINLESS STEEL KIT: Brand: GLIDESHEATH SLENDER